# Patient Record
Sex: FEMALE | Race: WHITE | Employment: OTHER | URBAN - METROPOLITAN AREA
[De-identification: names, ages, dates, MRNs, and addresses within clinical notes are randomized per-mention and may not be internally consistent; named-entity substitution may affect disease eponyms.]

---

## 2020-10-08 ENCOUNTER — OFFICE VISIT (OUTPATIENT)
Dept: ORTHOPEDICS CLINIC | Facility: CLINIC | Age: 70
End: 2020-10-08
Payer: COMMERCIAL

## 2020-10-08 DIAGNOSIS — B35.1 ONYCHOMYCOSIS OF FOOT WITH OTHER COMPLICATION: Primary | ICD-10-CM

## 2020-10-08 PROCEDURE — 11721 DEBRIDE NAIL 6 OR MORE: CPT | Performed by: PODIATRIST

## 2020-10-08 PROCEDURE — 99212 OFFICE O/P EST SF 10 MIN: CPT | Performed by: PODIATRIST

## 2020-10-08 RX ORDER — FLUTICASONE PROPIONATE 50 MCG
1 SPRAY, SUSPENSION (ML) NASAL AS NEEDED
COMMUNITY
Start: 2020-09-08

## 2020-10-08 RX ORDER — ATORVASTATIN CALCIUM 10 MG/1
10 TABLET, FILM COATED ORAL NIGHTLY
COMMUNITY
Start: 2020-09-17

## 2020-10-08 RX ORDER — METOPROLOL TARTRATE 50 MG/1
50 TABLET, FILM COATED ORAL 3 TIMES DAILY
Status: ON HOLD | COMMUNITY
Start: 2020-09-17 | End: 2021-01-09

## 2020-10-08 RX ORDER — OMEPRAZOLE 20 MG/1
20 CAPSULE, DELAYED RELEASE ORAL EVERY MORNING
COMMUNITY
Start: 2020-09-17

## 2020-10-08 RX ORDER — PREDNISOLONE ACETATE 10 MG/ML
1 SUSPENSION/ DROPS OPHTHALMIC DAILY PRN
COMMUNITY
Start: 2020-07-31 | End: 2021-08-06

## 2020-10-08 RX ORDER — LEVOTHYROXINE SODIUM 75 MCG
75 TABLET ORAL
COMMUNITY
Start: 2020-09-17 | End: 2021-08-06

## 2020-10-08 RX ORDER — METHYLPREDNISOLONE 4 MG/1
TABLET ORAL
COMMUNITY
Start: 2020-09-08 | End: 2020-11-27

## 2020-10-08 RX ORDER — AZILSARTAN KAMEDOXOMIL 40 MG/1
40 TABLET ORAL DAILY
Status: ON HOLD | COMMUNITY
Start: 2020-09-12 | End: 2021-01-09

## 2020-10-08 NOTE — PROGRESS NOTES
[facility] Progress Note    Subjective: Pt here for follow up and routine nail care for onychomycosis. Hx of lymphedema doc legs and feet for years.   Has been in PT with specialist prior and is awaiting more PT    Objective: severe swelling doc l

## 2020-11-27 ENCOUNTER — APPOINTMENT (OUTPATIENT)
Dept: GENERAL RADIOLOGY | Facility: HOSPITAL | Age: 70
End: 2020-11-27
Attending: EMERGENCY MEDICINE
Payer: COMMERCIAL

## 2020-11-27 ENCOUNTER — HOSPITAL ENCOUNTER (OUTPATIENT)
Facility: HOSPITAL | Age: 70
Setting detail: OBSERVATION
Discharge: HOME | End: 2020-11-30
Attending: EMERGENCY MEDICINE | Admitting: HOSPITALIST
Payer: COMMERCIAL

## 2020-11-27 DIAGNOSIS — I89.0 LYMPHEDEMA: ICD-10-CM

## 2020-11-27 DIAGNOSIS — L03.116 CELLULITIS OF LEFT LOWER EXTREMITY: Primary | ICD-10-CM

## 2020-11-27 PROCEDURE — 71045 X-RAY EXAM CHEST 1 VIEW: CPT | Performed by: EMERGENCY MEDICINE

## 2020-11-27 PROCEDURE — 99220 INITIAL OBSERVATION CARE,LEVL III: CPT | Performed by: HOSPITALIST

## 2020-11-27 RX ORDER — FLUTICASONE PROPIONATE 50 MCG
1 SPRAY, SUSPENSION (ML) NASAL DAILY
Status: DISCONTINUED | OUTPATIENT
Start: 2020-11-27 | End: 2020-11-30

## 2020-11-27 RX ORDER — ENOXAPARIN SODIUM 100 MG/ML
0.5 INJECTION SUBCUTANEOUS EVERY EVENING
Status: DISCONTINUED | OUTPATIENT
Start: 2020-11-27 | End: 2020-11-30

## 2020-11-27 RX ORDER — POTASSIUM CHLORIDE 20 MEQ/1
40 TABLET, EXTENDED RELEASE ORAL EVERY 4 HOURS
Status: COMPLETED | OUTPATIENT
Start: 2020-11-27 | End: 2020-11-27

## 2020-11-27 RX ORDER — CLINDAMYCIN PHOSPHATE 600 MG/50ML
600 INJECTION INTRAVENOUS ONCE
Status: COMPLETED | OUTPATIENT
Start: 2020-11-27 | End: 2020-11-27

## 2020-11-27 RX ORDER — ATORVASTATIN CALCIUM 10 MG/1
10 TABLET, FILM COATED ORAL DAILY
Status: DISCONTINUED | OUTPATIENT
Start: 2020-11-27 | End: 2020-11-30

## 2020-11-27 RX ORDER — CLINDAMYCIN PHOSPHATE 600 MG/50ML
600 INJECTION INTRAVENOUS EVERY 8 HOURS
Status: DISCONTINUED | OUTPATIENT
Start: 2020-11-27 | End: 2020-11-29

## 2020-11-27 RX ORDER — FAMOTIDINE 20 MG/1
20 TABLET ORAL DAILY
Status: DISCONTINUED | OUTPATIENT
Start: 2020-11-27 | End: 2020-11-30

## 2020-11-27 RX ORDER — METOPROLOL TARTRATE 50 MG/1
50 TABLET, FILM COATED ORAL 3 TIMES DAILY
Status: DISCONTINUED | OUTPATIENT
Start: 2020-11-27 | End: 2020-11-30

## 2020-11-27 RX ORDER — LOSARTAN POTASSIUM 25 MG/1
25 TABLET ORAL DAILY
Status: DISCONTINUED | OUTPATIENT
Start: 2020-11-27 | End: 2020-11-30

## 2020-11-27 RX ORDER — LEVOTHYROXINE SODIUM 0.07 MG/1
75 TABLET ORAL
Status: DISCONTINUED | OUTPATIENT
Start: 2020-11-27 | End: 2020-11-30

## 2020-11-27 RX ORDER — PREDNISOLONE ACETATE 10 MG/ML
1 SUSPENSION/ DROPS OPHTHALMIC DAILY
Status: DISCONTINUED | OUTPATIENT
Start: 2020-11-27 | End: 2020-11-30

## 2020-11-27 RX ORDER — FUROSEMIDE 10 MG/ML
20 INJECTION INTRAMUSCULAR; INTRAVENOUS ONCE
Status: COMPLETED | OUTPATIENT
Start: 2020-11-27 | End: 2020-11-27

## 2020-11-27 NOTE — PROGRESS NOTES
Arrives to room 411 per cart from ED, admission navigator completed , states mild discomfort to left leg , large Lymphedema BLE .  Distal aspect and feet, T=99.2, respirations easy , awake alert , appropriate , forgetful , use of walker, DR Cabrera in

## 2020-11-27 NOTE — PROGRESS NOTES
[facility] Pharmacy Note:  Anticoagulation Weight Dose Adjustment for enoxaparin (LOVENOX)    Divya Stinson is a 79year old patient who has been prescribed enoxaparin (LOVENOX) 40 mg every 24 hours.       Estimated Creatinine Clearance: 40.1 mL/min (A) (base

## 2020-11-27 NOTE — H&P
[facility]  History and Physical     Catia Gus Patient Status:  Observation    1950 MRN [id number]   [facility] 4NW-A Attending Ryanne Singh MD   Hosp Day # 0 PCP Stefany Reina     Chief Complaint: fever ONLY  Penicillins             HIVES  Sulfa Antibiotics       RASH  Tetracyclines & Rel*    RASH    Medications:  No current facility-administered medications on file prior to encounter. •  atorvastatin 10 MG Oral Tab, Take 10 mg by mouth daily.   , Disp extremities. Extremities: lymphedema +, bilateral stasis dermatitis, left LE warm with erythema and cellulitis, right leg also with dermatitis, although not warm or tender  Psychiatric: Appropriate mood and affect.       Diagnostic Data:      Labs:  Recent

## 2020-11-27 NOTE — ED INITIAL ASSESSMENT (HPI)
Per pt she was sitting on her bed and slid off the bed, pt denies hitting her head. Pt states her lymphodema is worse on both legs the past 2 weeks. EMS states pt has felt nauseous the past few days and had a temp of 102 in route.      Pt denies any SOB, fe

## 2020-11-27 NOTE — ED PROVIDER NOTES
Patient Seen in: [facility] Emergency Department      History   Patient presents with:  Leg or Foot Injury  Fall    Stated Complaint:     HPI    Patient is a 80-year-old female brought in by EMS after mechanical fall.   She got up to go to the [facility] supple. No JVD present. Cardiovascular: Normal rate and regular rhythm. Normal peripheral perfusion with good color. Pulmonary/Chest: Normal effort. No accessory muscle use. No clubbing, no cyanosis. Abdominal: Soft. There is no tenderness.  There Abnormal            Final result                 Please view results for these tests on the individual orders.    URINALYSIS WITH CULTURE REFLEX   PRO BETA NATRIURETIC PEPTIDE   RAINBOW DRAW BLUE   RAINBOW DRAW LAVENDER   RAINBOW DRAW LIGHT GREEN 72-year-old female here after mechanical fall found to be febrile, has elevated white count and cellulitis in the setting of very significant lymphedema. Given fever and white count as well as her age I feel this should be treated aggressively upfront.

## 2020-11-28 ENCOUNTER — APPOINTMENT (OUTPATIENT)
Dept: CV DIAGNOSTICS | Facility: HOSPITAL | Age: 70
End: 2020-11-28
Attending: HOSPITALIST
Payer: COMMERCIAL

## 2020-11-28 PROCEDURE — 93306 TTE W/DOPPLER COMPLETE: CPT | Performed by: HOSPITALIST

## 2020-11-28 PROCEDURE — 99225 SUBSEQUENT OBSERVATION CARE: CPT | Performed by: HOSPITALIST

## 2020-11-28 NOTE — PLAN OF CARE
A/O. Mid 90's on RA. CPOX. NSR on tele. Cellulitis LLE. BLE with lymphedema. BLE with redness, flaky, dry. L>R. Pt states back of legs painful to touch. Weeping at times left leg. Minimal this shift.  LLE loosely wrapped with kerlix and ace to help skin fro surrounding tissue  - Implement wound care per orders  - Initiate isolation precautions as appropriate  - Initiate Pressure Ulcer prevention bundle as indicated  Outcome: Not Progressing

## 2020-11-28 NOTE — PLAN OF CARE
Problem: PAIN - ADULT  Goal: Verbalizes/displays adequate comfort level or patient's stated pain goal  Description: INTERVENTIONS:  - Encourage pt to monitor pain and request assistance  - Assess pain using appropriate pain scale  - Administer analgesics this time. Voiding without difficulty. BLE noted with lymphedema & redness/flakiness. BLE elevated on pillows. POC discussed with pt, pt verbalized understanding. Will continue to monitor.

## 2020-11-28 NOTE — PROGRESS NOTES
[facility]  Progress Note     Neena Holguin Patient Status:  Observation    1950 MRN [id number]   [facility] 4NW-A Attending Joshua White MD   Hosp Day # 0 PCP Warden Vegas     Chief Complaint: cellulitis Daily   • Losartan Potassium  25 mg Oral Daily   • famoTIDine  20 mg Oral Daily   • Fluticasone Propionate  1 spray Nasal Daily   • Metoprolol Tartrate  50 mg Oral TID   • prednisoLONE acetate  1 drop Right Eye Daily   • Levothyroxine Sodium  75 mcg Oral B

## 2020-11-28 NOTE — PHYSICAL THERAPY NOTE
PHYSICAL THERAPY EVALUATION - INPATIENT     Room Number: 207/275-G  Evaluation Date: 11/28/2020  Type of Evaluation: Initial  Physician Order: PT Eval and Treat    Presenting Problem: cellulitis, fever  Reason for Therapy: Mobility Dysfunction and Renita Nice therapist again    OBJECTIVE     Fall Risk: Standard fall risk    WEIGHT BEARING RESTRICTION  Weight Bearing Restriction: None                PAIN ASSESSMENT  Ratin  Location: B LE  Management Techniques:  Activity promotion;Relaxation;Repositioning Evaluation; goals, role of PT and POC discussed with patient.   Patient was received in bed with HOB up to 70, patient performed supine->sit to EOB cga with HOB up and patient was able to move B LE off the bed needing increase in time to move B LE, patient showed 46.58% impairment. Based on this evaluation, patient's clinical presentation is evolving and overall the evaluation complexity is considered moderate.   These impairments and comorbidities manifest themselves as functional limitations in independent

## 2020-11-29 PROCEDURE — 99225 SUBSEQUENT OBSERVATION CARE: CPT | Performed by: HOSPITALIST

## 2020-11-29 RX ORDER — CEFAZOLIN SODIUM/WATER 2 G/20 ML
2 SYRINGE (ML) INTRAVENOUS EVERY 8 HOURS
Status: DISCONTINUED | OUTPATIENT
Start: 2020-11-29 | End: 2020-11-30

## 2020-11-29 NOTE — OCCUPATIONAL THERAPY NOTE
OCCUPATIONAL THERAPY EVALUATION - INPATIENT     Room Number: 184/956-J  Evaluation Date: 11/29/2020  Type of Evaluation: Initial  Presenting Problem: BLE cellulitis    Physician Order: IP Consult to Occupational Therapy  Reason for Therapy: ADL/IADL Dysfun had worked with a lymphedema therapist ~2 years ago and was able to get BLE edema under control.   Patient had been prescribed compression garments, however patient stopped wearing them ~1.5 years ago when she was unable to nikkie d/t restrictions s/p corneal educated on OT role, goals, plan of care, recommendations and safety.   Educated pt in the guidelines for lymphedema treatment following dx of infection: hold all treatment for 72 hours; beginning compression post 72 hours of antibiotics, as long as infecti nikkie compression stockings, instrumental activities of daily living, rest and sleep, work, leisure and social participation.      The patient is functioning below her previous functional level and would benefit from skilled inpatient OT to address the above exercises to BLE for edema reduction, independent level.

## 2020-11-29 NOTE — PLAN OF CARE
Problem: Impaired Activities of Daily Living  Goal: Achieve highest/safest level of independence in self care  Description: Interventions:  - Assess ability and encourage patient to participate in ADLs to maximize function  - Promote sitting position [facility]

## 2020-11-29 NOTE — PROGRESS NOTES
[facility]  Progress Note     Ramiro Hawk Patient Status:  Observation    1950 MRN [id number]   [facility] 4NW-A Attending Haleigh Leone MD   Hosp Day # 0 PCP Ruthy Bailey     Chief Complaint: cellulitis Daily   • Losartan Potassium  25 mg Oral Daily   • famoTIDine  20 mg Oral Daily   • Fluticasone Propionate  1 spray Nasal Daily   • Metoprolol Tartrate  50 mg Oral TID   • prednisoLONE acetate  1 drop Right Eye Daily   • Levothyroxine Sodium  75 mcg Oral B

## 2020-11-29 NOTE — PLAN OF CARE
Redness and swelling of BLE remains the same per patient, denies pain at rest, need assistance in getting out of bed. Fall risk, patient verbalized understanding re: using the call lights, bed alarm on. On ABT for cellulitis of LE. Denies SOB. POC cont. period  Description: INTERVENTIONS  - Monitor WBC  - Administer growth factors as ordered  - Implement neutropenic guidelines  Outcome: Progressing     Problem: Impaired Functional Mobility  Goal: Achieve highest/safest level of mobility/gait  Description:

## 2020-11-30 VITALS
TEMPERATURE: 98 F | DIASTOLIC BLOOD PRESSURE: 94 MMHG | HEIGHT: 63 IN | BODY MASS INDEX: 49.63 KG/M2 | WEIGHT: 280.13 LBS | HEART RATE: 53 BPM | RESPIRATION RATE: 20 BRPM | SYSTOLIC BLOOD PRESSURE: 147 MMHG | OXYGEN SATURATION: 96 %

## 2020-11-30 PROCEDURE — 99217 OBSERVATION CARE DISCHARGE: CPT | Performed by: HOSPITALIST

## 2020-11-30 RX ORDER — ACETAMINOPHEN 325 MG/1
650 TABLET ORAL EVERY 6 HOURS PRN
Status: DISCONTINUED | OUTPATIENT
Start: 2020-11-30 | End: 2020-11-30

## 2020-11-30 RX ORDER — CEFADROXIL 500 MG/1
1 CAPSULE ORAL 2 TIMES DAILY
Qty: 40 CAPSULE | Refills: 0 | Status: SHIPPED | OUTPATIENT
Start: 2020-11-30 | End: 2020-12-10

## 2020-11-30 NOTE — PLAN OF CARE
Pt alert and oriented x4. Pt denies SOB and VSS. No c/o pain. IV antibiotics continued. Up to bathroom. Voiding adequate amount of urine. Legs with lymphedema. Very red and swollen. Pt updated on POC. Will continue to monitor.

## 2020-11-30 NOTE — CM/SW NOTE
11/30/20 1300   CM/SW Screening   Referral Source Social Work (self-referral)   Information Source Chart review;Nursing rounds   Patient's Mental Status Alert;Oriented   Patient's Home Environment Condo/Apt with elevator   Patient Status Prior to [employer]

## 2020-11-30 NOTE — OCCUPATIONAL THERAPY NOTE
OCCUPATIONAL THERAPY TREATMENT NOTE - INPATIENT     Room Number: 196/969-F  Session: 1   Number of Visits to Meet Established Goals: 4    Presenting Problem: BLE cellulitis    History related to current admission:  Patient is a 75y/o female admitted 11/27/ includes using toilet, bedpan or urinal? : None  -   Putting on and taking off regular upper body clothing?: None  -   Taking care of personal grooming such as brushing teeth?: None  -   Eating meals?: None    AM-PAC Score:  Score: 20  Approx Degree of Imp information on recommended alternative compressions, sequential pneumatic pumps, decongestive treatment was also provided.   Supine to EOB with supervision and c/o right hip pain   EOB to stand with supervsion  On/off toilet: mod I  Toileting: mod I  Standi care to BLE.   Patient will perform decongestive exercises to BLE for edema reduction, independent level.

## 2020-11-30 NOTE — PLAN OF CARE
Pt. A&O x4. VSS. Afebrile. No c/o pain. Pt. Able to ambulate to the bathroom with minimal staff assist with the walker. Lymphedema noted to BLE. IV antibiotics. Call light within reach. Will continue to monitor. 1700: Pt.  Ok to d/c per MD's w/ PO anti precautions as appropriate  - Initiate Pressure Ulcer prevention bundle as indicated  Outcome: Progressing     Problem: RISK FOR INFECTION - ADULT  Goal: Absence of fever/infection during anticipated neutropenic period  Description: INTERVENTIONS  - Monito

## 2020-11-30 NOTE — PLAN OF CARE
Assumed care at 2300  Patient requesting Tylenol for headache - Dr. Douglas Naidu notified, Tylenol ordered  BLE with redness and swelling, L>R  IV Ancef  Will continue to monitor

## 2020-11-30 NOTE — PHYSICAL THERAPY NOTE
PHYSICAL THERAPY TREATMENT NOTE - INPATIENT    Room Number: 158/106-R     Session: 1  Number of Visits to Meet Established Goals: 2    Presenting Problem: cellulitis, fever    Problem List  Principal Problem:    Cellulitis of left lower extremity  Active the patient currently need. ..   -   Moving to and from a bed to a chair (including a wheelchair)?: A Little   -   Need to walk in hospital room?: A Little   -   Climbing 3-5 steps with a railing?: A Little       AM-PAC Score:  Raw Score: 21   Approx Degree standing rest breaks, and frequent question asking.  Once pt returned to chair,increased time spent on education and discussion with pt regarding symptom management and rest, activity level and pacing, home safety and set up, HHPT recommendation (pt in agre this level of impairment may benefit from DC to home with HHPT and outpatient OT for lymphedema management. [place] DISCHARGE RECOMMENDATIONS  PT Discharge Recommendations: Home with home health PT     PLAN  PT Treatment Plan: Bed mobility;Gait training; Avery Reina

## 2020-11-30 NOTE — PLAN OF CARE
Problem: Impaired Activities of Daily Living  Goal: Achieve highest/safest level of independence in self care  Description: Interventions:  - Assess ability and encourage patient to participate in ADLs to maximize function  - Promote sitting position [facility]

## 2020-11-30 NOTE — PLAN OF CARE
Afebrile. Resting in bed, on ABT re cellulitis of BLE, redness and swelling appeared the same from yesterday, elevated with pillow, denies SOB, up to the bathroom with walker with assist. POC cont.     Problem: PAIN - ADULT  Goal: Verbalizes/displays adequa growth factors as ordered  - Implement neutropenic guidelines  Outcome: Progressing

## 2020-11-30 NOTE — PHYSICAL THERAPY NOTE
Chart reviewed and RN approved PT session. PT with PPE on to include mask, gloves, and goggles. Attempted to see pt for PT treatment session, and pt declined as pt states that she only got 2 hours of sleep and just woke up.  Will attempt to see pt later as

## 2020-11-30 NOTE — PROGRESS NOTES
[facility]  Progress Note     Patricia Kristal Patient Status:  Observation    1950 MRN [id number]   [facility] 4NW-A Attending Lisa Lee MD   Hosp Day # 0 PCP Laisha Pickard     Chief Complaint: cellulitis atorvastatin  10 mg Oral Daily   • Losartan Potassium  25 mg Oral Daily   • famoTIDine  20 mg Oral Daily   • Fluticasone Propionate  1 spray Nasal Daily   • Metoprolol Tartrate  50 mg Oral TID   • prednisoLONE acetate  1 drop Right Eye Daily   • Levothyrox

## 2020-12-01 NOTE — DISCHARGE SUMMARY
[facility] HOSPITALIST  DISCHARGE SUMMARY     Aftab Ferguson Patient Status:  Observation    1950 MRN [id number]   [facility] 4NW-A Attending Tavo Garland MD   Hosp Day # 0  Teresita     Date of Admission: 20 home health has been cleared for discharge. Home today will need follow-up for lymphedema. Lace+ Score: 35  59-90 High Risk  29-58 Medium Risk  0-28   Low Risk         TCM Follow-Up Recommendation:  LACE > 58:  High Risk of readmission after discharge f every morning. Refills: 0     prednisoLONE acetate 1 % Susp  Commonly known as: PRED FORTE      Place 1 drop into the right eye daily.    Refills: 0     Synthroid 75 MCG Tabs  Generic drug: Levothyroxine Sodium      Take 75 mcg by mouth before breakfast.

## 2021-01-06 ENCOUNTER — HOSPITAL ENCOUNTER (OUTPATIENT)
Facility: HOSPITAL | Age: 71
Setting detail: OBSERVATION
Discharge: HOME HEALTH | End: 2021-01-09
Attending: EMERGENCY MEDICINE | Admitting: HOSPITALIST
Payer: MEDICARE

## 2021-01-06 DIAGNOSIS — E66.01 MORBID OBESITY (HCC): ICD-10-CM

## 2021-01-06 DIAGNOSIS — L03.116 LEFT LEG CELLULITIS: Primary | ICD-10-CM

## 2021-01-06 DIAGNOSIS — I89.0 LYMPHEDEMA: ICD-10-CM

## 2021-01-06 LAB
ALBUMIN SERPL-MCNC: 2.7 G/DL (ref 3.4–5)
ALBUMIN/GLOB SERPL: 0.6 {RATIO} (ref 1–2)
ALP LIVER SERPL-CCNC: 95 U/L
ALT SERPL-CCNC: 18 U/L
ANION GAP SERPL CALC-SCNC: 10 MMOL/L (ref 0–18)
AST SERPL-CCNC: 32 U/L (ref 15–37)
BASOPHILS # BLD AUTO: 0.01 X10(3) UL (ref 0–0.2)
BASOPHILS NFR BLD AUTO: 0.1 %
BILIRUB SERPL-MCNC: 0.6 MG/DL (ref 0.1–2)
BUN BLD-MCNC: 29 MG/DL (ref 7–18)
BUN/CREAT SERPL: 24.8 (ref 10–20)
CALCIUM BLD-MCNC: 9.4 MG/DL (ref 8.5–10.1)
CHLORIDE SERPL-SCNC: 101 MMOL/L (ref 98–112)
CO2 SERPL-SCNC: 24 MMOL/L (ref 21–32)
CREAT BLD-MCNC: 1.17 MG/DL
DEPRECATED RDW RBC AUTO: 42.7 FL (ref 35.1–46.3)
EOSINOPHIL # BLD AUTO: 0.19 X10(3) UL (ref 0–0.7)
EOSINOPHIL NFR BLD AUTO: 2.1 %
ERYTHROCYTE [DISTWIDTH] IN BLOOD BY AUTOMATED COUNT: 12.7 % (ref 11–15)
GLOBULIN PLAS-MCNC: 4.5 G/DL (ref 2.8–4.4)
GLUCOSE BLD-MCNC: 107 MG/DL (ref 70–99)
HCT VFR BLD AUTO: 38 %
HGB BLD-MCNC: 13 G/DL
IMM GRANULOCYTES # BLD AUTO: 0.03 X10(3) UL (ref 0–1)
IMM GRANULOCYTES NFR BLD: 0.3 %
LACTATE SERPL-SCNC: 1.1 MMOL/L (ref 0.4–2)
LACTATE SERPL-SCNC: 2.7 MMOL/L (ref 0.4–2)
LYMPHOCYTES # BLD AUTO: 0.66 X10(3) UL (ref 1–4)
LYMPHOCYTES NFR BLD AUTO: 7.2 %
M PROTEIN MFR SERPL ELPH: 7.2 G/DL (ref 6.4–8.2)
MCH RBC QN AUTO: 31.6 PG (ref 26–34)
MCHC RBC AUTO-ENTMCNC: 34.2 G/DL (ref 31–37)
MCV RBC AUTO: 92.5 FL
MONOCYTES # BLD AUTO: 0.78 X10(3) UL (ref 0.1–1)
MONOCYTES NFR BLD AUTO: 8.5 %
NEUTROPHILS # BLD AUTO: 7.52 X10 (3) UL (ref 1.5–7.7)
NEUTROPHILS # BLD AUTO: 7.52 X10(3) UL (ref 1.5–7.7)
NEUTROPHILS NFR BLD AUTO: 81.8 %
NT-PROBNP SERPL-MCNC: 109 PG/ML (ref ?–125)
OSMOLALITY SERPL CALC.SUM OF ELEC: 286 MOSM/KG (ref 275–295)
PLATELET # BLD AUTO: 197 10(3)UL (ref 150–450)
POTASSIUM SERPL-SCNC: 3.4 MMOL/L (ref 3.5–5.1)
RBC # BLD AUTO: 4.11 X10(6)UL
SARS-COV-2 RNA RESP QL NAA+PROBE: NOT DETECTED
SODIUM SERPL-SCNC: 135 MMOL/L (ref 136–145)
WBC # BLD AUTO: 9.2 X10(3) UL (ref 4–11)

## 2021-01-06 PROCEDURE — 99220 INITIAL OBSERVATION CARE,LEVL III: CPT | Performed by: HOSPITALIST

## 2021-01-06 RX ORDER — FLUTICASONE PROPIONATE 50 MCG
1 SPRAY, SUSPENSION (ML) NASAL
Status: DISCONTINUED | OUTPATIENT
Start: 2021-01-06 | End: 2021-01-09

## 2021-01-06 RX ORDER — ONDANSETRON 2 MG/ML
4 INJECTION INTRAMUSCULAR; INTRAVENOUS EVERY 4 HOURS PRN
Status: DISCONTINUED | OUTPATIENT
Start: 2021-01-06 | End: 2021-01-06

## 2021-01-06 RX ORDER — FAMOTIDINE 20 MG/1
20 TABLET ORAL DAILY
Status: DISCONTINUED | OUTPATIENT
Start: 2021-01-06 | End: 2021-01-06

## 2021-01-06 RX ORDER — MELATONIN
1000 DAILY
COMMUNITY

## 2021-01-06 RX ORDER — HEPARIN SODIUM 5000 [USP'U]/ML
7500 INJECTION, SOLUTION INTRAVENOUS; SUBCUTANEOUS EVERY 8 HOURS SCHEDULED
Status: DISCONTINUED | OUTPATIENT
Start: 2021-01-06 | End: 2021-01-09

## 2021-01-06 RX ORDER — LEVOTHYROXINE SODIUM 0.07 MG/1
75 TABLET ORAL
Status: DISCONTINUED | OUTPATIENT
Start: 2021-01-07 | End: 2021-01-09

## 2021-01-06 RX ORDER — HYDROCODONE BITARTRATE AND ACETAMINOPHEN 5; 325 MG/1; MG/1
2 TABLET ORAL EVERY 4 HOURS PRN
Status: DISCONTINUED | OUTPATIENT
Start: 2021-01-06 | End: 2021-01-09

## 2021-01-06 RX ORDER — CEFAZOLIN SODIUM/WATER 2 G/20 ML
2 SYRINGE (ML) INTRAVENOUS ONCE
Status: COMPLETED | OUTPATIENT
Start: 2021-01-06 | End: 2021-01-06

## 2021-01-06 RX ORDER — FUROSEMIDE 10 MG/ML
40 INJECTION INTRAMUSCULAR; INTRAVENOUS
Status: DISCONTINUED | OUTPATIENT
Start: 2021-01-06 | End: 2021-01-07

## 2021-01-06 RX ORDER — ONDANSETRON 2 MG/ML
4 INJECTION INTRAMUSCULAR; INTRAVENOUS EVERY 6 HOURS PRN
Status: DISCONTINUED | OUTPATIENT
Start: 2021-01-06 | End: 2021-01-09

## 2021-01-06 RX ORDER — METOPROLOL TARTRATE 50 MG/1
50 TABLET, FILM COATED ORAL 3 TIMES DAILY
Status: DISCONTINUED | OUTPATIENT
Start: 2021-01-06 | End: 2021-01-09

## 2021-01-06 RX ORDER — HYDROCODONE BITARTRATE AND ACETAMINOPHEN 5; 325 MG/1; MG/1
1 TABLET ORAL EVERY 4 HOURS PRN
Status: DISCONTINUED | OUTPATIENT
Start: 2021-01-06 | End: 2021-01-09

## 2021-01-06 RX ORDER — PANTOPRAZOLE SODIUM 20 MG/1
20 TABLET, DELAYED RELEASE ORAL
Status: DISCONTINUED | OUTPATIENT
Start: 2021-01-07 | End: 2021-01-09

## 2021-01-06 RX ORDER — PREDNISOLONE ACETATE 10 MG/ML
1 SUSPENSION/ DROPS OPHTHALMIC DAILY
Status: DISCONTINUED | OUTPATIENT
Start: 2021-01-06 | End: 2021-01-09

## 2021-01-06 RX ORDER — ACETAMINOPHEN 325 MG/1
650 TABLET ORAL EVERY 4 HOURS PRN
Status: DISCONTINUED | OUTPATIENT
Start: 2021-01-06 | End: 2021-01-09

## 2021-01-06 RX ORDER — MULTIVIT-MIN/IRON/FOLIC ACID/K 18-600-40
2000 CAPSULE ORAL DAILY
COMMUNITY

## 2021-01-06 RX ORDER — CEFAZOLIN SODIUM/WATER 2 G/20 ML
2 SYRINGE (ML) INTRAVENOUS EVERY 12 HOURS
Status: DISCONTINUED | OUTPATIENT
Start: 2021-01-07 | End: 2021-01-07

## 2021-01-06 RX ORDER — ATORVASTATIN CALCIUM 10 MG/1
10 TABLET, FILM COATED ORAL DAILY
Status: DISCONTINUED | OUTPATIENT
Start: 2021-01-06 | End: 2021-01-09

## 2021-01-06 RX ORDER — METOCLOPRAMIDE HYDROCHLORIDE 5 MG/ML
5 INJECTION INTRAMUSCULAR; INTRAVENOUS EVERY 8 HOURS PRN
Status: DISCONTINUED | OUTPATIENT
Start: 2021-01-06 | End: 2021-01-09

## 2021-01-06 NOTE — ED PROVIDER NOTES
Patient Seen in: [facility] Emergency Department      History   Patient presents with:  Leg or Foot Injury    Stated Complaint: LEG PAIN, TROUBLE WALKING    HPI/Subjective:   HPI    72-year-old female with a history of chronic lymphedema, hypertensi bilaterally. Abdomen: Soft, nondistended, nontender. Extremities: No evidence of deformity. Patient with significant lymphedema bilaterally. She has erythema to her left leg extending down from her knee Down to the foot.   She has some weeping of fluid medications for the purposes of treating  [left leg cellulitis]. The patient had good response to these medications. Patient continued to be observed here in the emergency department.   Patient remained stable throughout the emergency department observa

## 2021-01-06 NOTE — PROGRESS NOTES
[facility] Pharmacy Note: Antimicrobial Weight Based Dose Adjustment for: cefazolin (ANCEF)    Noah Brizuela is a 79year old patient who has been prescribed cefazolin (ANCEF) 1 g       Wt Readings from Last 6 Encounters:  01/06/21 : 127 kg (280 lb)  11/29/2

## 2021-01-07 LAB
ANION GAP SERPL CALC-SCNC: 8 MMOL/L (ref 0–18)
BASOPHILS # BLD AUTO: 0.01 X10(3) UL (ref 0–0.2)
BASOPHILS NFR BLD AUTO: 0.1 %
BUN BLD-MCNC: 27 MG/DL (ref 7–18)
BUN/CREAT SERPL: 27.6 (ref 10–20)
CALCIUM BLD-MCNC: 8.3 MG/DL (ref 8.5–10.1)
CHLORIDE SERPL-SCNC: 103 MMOL/L (ref 98–112)
CO2 SERPL-SCNC: 26 MMOL/L (ref 21–32)
CREAT BLD-MCNC: 0.98 MG/DL
DEPRECATED RDW RBC AUTO: 44.1 FL (ref 35.1–46.3)
EOSINOPHIL # BLD AUTO: 0.23 X10(3) UL (ref 0–0.7)
EOSINOPHIL NFR BLD AUTO: 3.2 %
ERYTHROCYTE [DISTWIDTH] IN BLOOD BY AUTOMATED COUNT: 12.9 % (ref 11–15)
GLUCOSE BLD-MCNC: 104 MG/DL (ref 70–99)
HCT VFR BLD AUTO: 32.8 %
HGB BLD-MCNC: 10.8 G/DL
IMM GRANULOCYTES # BLD AUTO: 0.03 X10(3) UL (ref 0–1)
IMM GRANULOCYTES NFR BLD: 0.4 %
LYMPHOCYTES # BLD AUTO: 0.74 X10(3) UL (ref 1–4)
LYMPHOCYTES NFR BLD AUTO: 10.4 %
MCH RBC QN AUTO: 30.8 PG (ref 26–34)
MCHC RBC AUTO-ENTMCNC: 32.9 G/DL (ref 31–37)
MCV RBC AUTO: 93.4 FL
MONOCYTES # BLD AUTO: 0.85 X10(3) UL (ref 0.1–1)
MONOCYTES NFR BLD AUTO: 11.9 %
NEUTROPHILS # BLD AUTO: 5.28 X10 (3) UL (ref 1.5–7.7)
NEUTROPHILS # BLD AUTO: 5.28 X10(3) UL (ref 1.5–7.7)
NEUTROPHILS NFR BLD AUTO: 74 %
OSMOLALITY SERPL CALC.SUM OF ELEC: 289 MOSM/KG (ref 275–295)
PLATELET # BLD AUTO: 186 10(3)UL (ref 150–450)
POTASSIUM SERPL-SCNC: 3.3 MMOL/L (ref 3.5–5.1)
POTASSIUM SERPL-SCNC: 4 MMOL/L (ref 3.5–5.1)
RBC # BLD AUTO: 3.51 X10(6)UL
SODIUM SERPL-SCNC: 137 MMOL/L (ref 136–145)
WBC # BLD AUTO: 7.1 X10(3) UL (ref 4–11)

## 2021-01-07 PROCEDURE — 99225 SUBSEQUENT OBSERVATION CARE: CPT | Performed by: HOSPITALIST

## 2021-01-07 RX ORDER — POTASSIUM CHLORIDE 20 MEQ/1
40 TABLET, EXTENDED RELEASE ORAL EVERY 4 HOURS
Status: COMPLETED | OUTPATIENT
Start: 2021-01-07 | End: 2021-01-07

## 2021-01-07 RX ORDER — CEFAZOLIN SODIUM/WATER 2 G/20 ML
2 SYRINGE (ML) INTRAVENOUS EVERY 8 HOURS
Status: DISCONTINUED | OUTPATIENT
Start: 2021-01-07 | End: 2021-01-09

## 2021-01-07 NOTE — CONSULTS
[facility]  Report of Inpatient Wound Care Consultation    Serena Eng Patient Status:  Observation    1950 MRN [id number]   [facility] 3SW-A Attending Ara Chang MD   Hosp Day # 0 PCP Hillary Byrd Abena leg :lymphedema- weeping with moderate to large amount of serous yellow exudate. No odor. Noted scattered superficial open blisters.     Edema assessment:  Heel to back of knee[10  marker]  Heel to midcalf [29 marker]    Ankle   Right  50.5cm  Left    44.8cm

## 2021-01-07 NOTE — H&P
[facility]  History and Physical     Catia Weber Patient Status:  Emergency    1950 MRN [id number]   Location [address] Attending Luis Fernando Tadeo MD   Hosp Day # 0 PCP Stefany Reina     Chief Complai EDARBI 40 MG Oral Tab, Take 40 mg by mouth daily. , Disp: , Rfl:     •  SYNTHROID 75 MCG Oral Tab, Take 75 mcg by mouth before breakfast.  , Disp: , Rfl:     •  Metoprolol Tartrate 50 MG Oral Tab, Take 50 mg by mouth 3 (three) times daily.   , Disp: , Rfl BILT 0.6   TP 7.2       Estimated Creatinine Clearance: 37 mL/min (A) (based on SCr of 1.17 mg/dL (H)). No results for input(s): PTP, INR in the last 168 hours. No results for input(s): TROP, CK in the last 168 hours.     Imaging: Imaging data revie

## 2021-01-07 NOTE — PLAN OF CARE
Wound care to see  Daily dressing changes to BLE  Significant lymphedema to BLE with weeping blisters   RA  Heparin subcutaneous  Physical therapy to see  Potassium replaced per protocol

## 2021-01-07 NOTE — PROGRESS NOTES
[facility] note: Duplicate Proton Pump Inhibitor with Histamine 2 blocker. Liza Recinos is a 79year old patient who has been prescribed both famotidine (Pepcid)  And pantoprazole (Protonix).   Pepcid was discontinued per P&T approved protocol fo

## 2021-01-07 NOTE — PHYSICAL THERAPY NOTE
PHYSICAL THERAPY EVALUATION - INPATIENT     Room Number: 384/384-A  Evaluation Date: 1/7/2021  Type of Evaluation: Initial  Physician Order: PT Eval and Treat    Presenting Problem:  B LE cellulitis  Reason for Therapy: Mobility Dysfunction and Discha eval    Lower extremity ROM is within functional limits significantly limited by edema    Lower extremity strength is within functional limits grossly observed to be 4-/5 overall functionally.     BALANCE  Static Sitting: Good  Dynamic Sitting: Good  Static addressed    ASSESSMENT   Patient is a 79year old female admitted on 1/6/2021 for LE cellulitis. Pertinent comorbidities and personal factors impacting therapy include lymphedema.   In this PT evaluation, the patient presents with the following impairment

## 2021-01-07 NOTE — PROGRESS NOTES
NURSING ADMISSION NOTE      Patient admitted via Cart, transferred to bed x4 person assist  Oriented to room. Safety precautions initiated. Bed in low position. Call light in reach.

## 2021-01-07 NOTE — PLAN OF CARE
Pt a/o x4. VSS. No c/o pain at rest, pain with movement of legs. No N/T. BLE +3 pitting edema. LLE has more redness than RLE. Blistering and weeping to BLE. Vaseline gauze and kerlix applied to BLE. Purewick in place, pt voiding clear yellow urine.      Louise

## 2021-01-07 NOTE — PROGRESS NOTES
[facility]  Progress Note     Gurpreet Reece Patient Status:  Observation    1950 MRN [id number]   [facility] 3SW-A Attending Tre Schuler MD   Hosp Day # 0 PCP Evan Gerardo     Chief Complaint: cellulitis Imaging data reviewed in Epic.     Medications:   • Potassium Chloride ER  40 mEq Oral Q4H   • atorvastatin  10 mg Oral Daily   • Metoprolol Tartrate  50 mg Oral TID   • Pantoprazole Sodium  20 mg Oral QAM AC   • prednisoLONE acetate  1 drop Right Eye Daily

## 2021-01-07 NOTE — PROGRESS NOTES
[facility] Pharmacy Note:  Anticoagulation Weight Dose Adjustment for heparin    Richerd Sushila is a 79year old patient who has been prescribed heparin 5000 units every 8 hours.       Estimated Creatinine Clearance: 37 mL/min (A) (based on SCr of 1.17 mg/dL (

## 2021-01-07 NOTE — PROGRESS NOTES
[facility] Pharmacy Note:  Renal Adjustment for cefazolin (ANCEF)    Divya Stinson is a 79year old patient who has been prescribed cefazolin (ANCEF) 2 g every 8 hrs. CrCl is estimated creatinine clearance is 37 mL/min (A) (based on SCr of 1.17 mg/dL (H)).

## 2021-01-08 ENCOUNTER — APPOINTMENT (OUTPATIENT)
Dept: ULTRASOUND IMAGING | Facility: HOSPITAL | Age: 71
End: 2021-01-08
Attending: HOSPITALIST
Payer: MEDICARE

## 2021-01-08 PROCEDURE — 76882 US LMTD JT/FCL EVL NVASC XTR: CPT | Performed by: HOSPITALIST

## 2021-01-08 PROCEDURE — 99225 SUBSEQUENT OBSERVATION CARE: CPT | Performed by: HOSPITALIST

## 2021-01-08 RX ORDER — POTASSIUM CHLORIDE 750 MG/1
10 TABLET, EXTENDED RELEASE ORAL DAILY
Status: DISCONTINUED | OUTPATIENT
Start: 2021-01-08 | End: 2021-01-08

## 2021-01-08 NOTE — CM/SW NOTE
01/08/21 1300   CM/SW Referral Data   Referral Source Physician   Reason for Referral Discharge planning   Informant Patient   Patient Info   Patient's Mental Status Alert;Oriented   Patient's Home Environment Condo/Apt with elevator   Number of Levels

## 2021-01-08 NOTE — PROGRESS NOTES
[facility]  Progress Note     Nina Sol Patient Status:  Observation    1950 MRN [id number]   [facility] 3SW-A Attending Rochelle Meade MD   Hosp Day # 0 PCP Lashon Alejandre     Chief Complaint: cellulitis Markers  No results for input(s): CRP, ANNETTE, LDH, DDIMER in the last 168 hours. Imaging: Imaging data reviewed in Epic.     Medications:   • ceFAZolin  2 g Intravenous Q8H   • atorvastatin  10 mg Oral Daily   • Metoprolol Tartrate  50 mg Oral TID   • Pant

## 2021-01-08 NOTE — OCCUPATIONAL THERAPY NOTE
Orders received, chart reviewed and case discussed with PT and RN. Patient is known to this therapist from previous admit in Nov/2020 for BLE cellulitis. Patient was treated w/ light compression and extensive lymphedema education was conducted.   Written

## 2021-01-08 NOTE — PLAN OF CARE
Dressing changed to BLE, OT ordered for BLE lymphedema, spoke w/ Kelley OT, at this time unable to see due to weeping of wound -recommends to see outpatient PT upon discharge, IV abx con't , pain meds prn, up w/ asst, updated on plan of care.

## 2021-01-08 NOTE — PHYSICAL THERAPY NOTE
Attempted to see Pt this AM - RN Emma Kramer aware of attempt. Pt reported difficulty resting d/t multiple visitors - requesting to rest at this time. Will continue to follow.

## 2021-01-08 NOTE — PLAN OF CARE
A&Ox4. Pain to BLE increases when oob. Declining prn pain meds. States pain improves when back in bed. Still having small weeping. VSS. On RA. Ambulated to bathroom with min assist/walker. Call light within reach.

## 2021-01-09 VITALS
OXYGEN SATURATION: 96 % | BODY MASS INDEX: 49.61 KG/M2 | TEMPERATURE: 99 F | HEIGHT: 63 IN | HEART RATE: 53 BPM | WEIGHT: 280 LBS | RESPIRATION RATE: 18 BRPM | DIASTOLIC BLOOD PRESSURE: 52 MMHG | SYSTOLIC BLOOD PRESSURE: 138 MMHG

## 2021-01-09 PROCEDURE — 99217 OBSERVATION CARE DISCHARGE: CPT | Performed by: HOSPITALIST

## 2021-01-09 RX ORDER — METOPROLOL TARTRATE 50 MG/1
100 TABLET, FILM COATED ORAL EVERY MORNING
Refills: 0 | Status: SHIPPED | COMMUNITY
Start: 2021-01-09

## 2021-01-09 RX ORDER — CEFADROXIL 500 MG/1
1 CAPSULE ORAL 2 TIMES DAILY
Qty: 40 CAPSULE | Refills: 0 | Status: SHIPPED | OUTPATIENT
Start: 2021-01-09 | End: 2021-01-19

## 2021-01-09 RX ORDER — POLYETHYLENE GLYCOL 3350 17 G/17G
17 POWDER, FOR SOLUTION ORAL DAILY
Status: DISCONTINUED | OUTPATIENT
Start: 2021-01-09 | End: 2021-01-09

## 2021-01-09 NOTE — PROGRESS NOTES
[facility]  Progress Note     Steven Irizarry Patient Status:  Observation    1950 MRN [id number]   [facility] 3SW-A Attending Mac Valverde MD   Hosp Day # 0 PCP Rian Birmingham     Chief Complaint: cellulitis Markers  No results for input(s): CRP, ANNETTE, LDH, DDIMER in the last 168 hours. Imaging: Imaging data reviewed in Epic.     Medications:   • PEG 3350  17 g Oral Daily   • ceFAZolin  2 g Intravenous Q8H   • atorvastatin  10 mg Oral Daily   • Metoprolol Tar

## 2021-01-09 NOTE — CM/SW NOTE
[facility] to arrive at 6:00pm. Rn stated the pt's brother may pick her up.  RN to cancel [facility] if pt no longer needs it [phone]

## 2021-01-09 NOTE — CM/SW NOTE
SW spoke w/pt in regards to dc plan. Pt stating she feels she is not going to do well at home. Provided support and informed her Abbie Eckert will be servicing her. Also offered for pt to go to a rehab center. She is concerned about COVID.  Sent referra

## 2021-01-09 NOTE — PLAN OF CARE
Plan=today -home w/ [facility] , Instructions to be placed in AVS, instructed on discharge instructions , denies pain, [facility] to be set up for will call after 1700, updated on plan of care.

## 2021-01-09 NOTE — PLAN OF CARE
Discharge planning for today, lives alone & states boxes everywhere, seen by Dr. Cornell Ríos - SW consult ordered, needs help w/ bilateral dressing changes, and requests for help at home , will page  , denies pain,  dsg changed - see instructions.

## 2021-01-09 NOTE — PHYSICAL THERAPY NOTE
PHYSICAL THERAPY TREATMENT NOTE - INPATIENT    Room Number: 384/384-A     Session: 1   Number of Visits to Meet Established Goals: 3    Presenting Problem:  B LE cellulitis    Problem List  Principal Problem:    Left leg cellulitis  Active Problems:    Lym currently need. ..   -   Moving to and from a bed to a chair (including a wheelchair)?: None   -   Need to walk in hospital room?: None   -   Climbing 3-5 steps with a railing?: A Little(infer)       AM-PAC Score:  Raw Score: 23   Approx Degree of Impairmen from [address] RN aware to re-order if there is a decline in mobility status. DISCHARGE RECOMMENDATIONS  PT Discharge Recommendations: Home with home health PT     PLAN  PT Treatment Plan: Bed mobility; Endurance; Patient education; Family educat

## 2021-01-09 NOTE — PLAN OF CARE
Alert and oriented x 4. VSS. On room air. . Denies pain. Dressings to BLE changed per order. Safety precautions in place. Call light within reach. Will continue to monitor.

## 2021-01-09 NOTE — CM/SW NOTE
Erica BOONE stating they are having the same issue with pt's insurance. Pt to call [facility] when she gets home to give them the updated policy numbers. The ones SW gave to the facility are not valid.  Pt stated she will ask her brother f

## 2021-01-09 NOTE — CM/SW NOTE
Due to the [facility] population in the hospital, patient has not been admitted, please observe COVID waiver.     The [facility] is requesting this documentation

## 2021-01-09 NOTE — CM/SW NOTE
The [facility] stated they can accept the pt under the medicare waiver. [facility] are saying the pt's Medicare is still coming back as a secondary. DUNCAN provided them w/the new number . Awaiting response. DON requested.

## 2021-01-12 NOTE — DISCHARGE SUMMARY
[facility] HOSPITALIST  DISCHARGE SUMMARY     Jenny Llanos Patient Status:  Observation    1950 MRN [id number]   [facility] 3SW-A Attending No att. providers found   Hosp Day # 0 PCP Iesha Gonzalez     Date of Admission:  CHANGE how you take these medications      Instructions Prescription details   Metoprolol Tartrate 50 MG Tabs  Commonly known as: LOPRESSOR  What changed: when to take this      Take 1 tablet (50 mg total) by mouth 2 (two) times daily.    Refills: 0 sounds.     -----------------------------------------------------------------------------------------------  PATIENT DISCHARGE INSTRUCTIONS: See electronic chart    Adrian Finn MD    Time spent:  > 30 minutes

## 2021-03-04 ENCOUNTER — LAB REQUISITION (OUTPATIENT)
Dept: LAB | Facility: HOSPITAL | Age: 71
End: 2021-03-04
Payer: MEDICARE

## 2021-03-04 DIAGNOSIS — N18.30 CHRONIC KIDNEY DISEASE, STAGE 3 UNSPECIFIED (HCC): ICD-10-CM

## 2021-03-04 DIAGNOSIS — I89.0 LYMPHEDEMA, NOT ELSEWHERE CLASSIFIED: ICD-10-CM

## 2021-03-04 DIAGNOSIS — I12.9 HYPERTENSIVE CHRONIC KIDNEY DISEASE WITH STAGE 1 THROUGH STAGE 4 CHRONIC KIDNEY DISEASE, OR UNSPECIFIED CHRONIC KIDNEY DISEASE: ICD-10-CM

## 2021-03-04 DIAGNOSIS — E87.6 HYPOKALEMIA: ICD-10-CM

## 2021-03-04 LAB — POTASSIUM SERPL-SCNC: 3.4 MMOL/L (ref 3.5–5.1)

## 2021-03-04 PROCEDURE — 84132 ASSAY OF SERUM POTASSIUM: CPT | Performed by: INTERNAL MEDICINE

## 2021-04-25 ENCOUNTER — HOSPITAL ENCOUNTER (INPATIENT)
Facility: HOSPITAL | Age: 71
LOS: 4 days | Discharge: NURSING, REHAB OR LONG-TERM CARE | DRG: 872 | End: 2021-04-29
Attending: EMERGENCY MEDICINE | Admitting: HOSPITALIST
Payer: MEDICARE

## 2021-04-25 ENCOUNTER — APPOINTMENT (OUTPATIENT)
Dept: GENERAL RADIOLOGY | Facility: HOSPITAL | Age: 71
DRG: 872 | End: 2021-04-25
Attending: EMERGENCY MEDICINE
Payer: MEDICARE

## 2021-04-25 DIAGNOSIS — I89.0 LYMPHEDEMA OF BOTH LOWER EXTREMITIES: ICD-10-CM

## 2021-04-25 DIAGNOSIS — L03.115 CELLULITIS OF RIGHT LEG: Primary | ICD-10-CM

## 2021-04-25 PROBLEM — D69.6 THROMBOCYTOPENIA (HCC): Status: ACTIVE | Noted: 2021-04-25

## 2021-04-25 PROBLEM — E87.20 METABOLIC ACIDOSIS: Status: ACTIVE | Noted: 2021-04-25

## 2021-04-25 PROBLEM — E87.1 HYPONATREMIA: Status: ACTIVE | Noted: 2021-04-25

## 2021-04-25 PROBLEM — E87.2 METABOLIC ACIDOSIS: Status: ACTIVE | Noted: 2021-04-25

## 2021-04-25 PROBLEM — R79.89 AZOTEMIA: Status: ACTIVE | Noted: 2021-04-25

## 2021-04-25 PROCEDURE — 71045 X-RAY EXAM CHEST 1 VIEW: CPT | Performed by: EMERGENCY MEDICINE

## 2021-04-25 PROCEDURE — 99223 1ST HOSP IP/OBS HIGH 75: CPT | Performed by: HOSPITALIST

## 2021-04-25 RX ORDER — CEFAZOLIN SODIUM/WATER 2 G/20 ML
2 SYRINGE (ML) INTRAVENOUS EVERY 12 HOURS
Status: DISCONTINUED | OUTPATIENT
Start: 2021-04-26 | End: 2021-04-27

## 2021-04-25 RX ORDER — METOPROLOL TARTRATE 50 MG/1
50 TABLET, FILM COATED ORAL EVERY EVENING
Status: ON HOLD | COMMUNITY
End: 2021-04-27

## 2021-04-25 RX ORDER — PREDNISOLONE ACETATE 10 MG/ML
1 SUSPENSION/ DROPS OPHTHALMIC NIGHTLY
Status: DISCONTINUED | OUTPATIENT
Start: 2021-04-25 | End: 2021-04-29

## 2021-04-25 RX ORDER — TRAMADOL HYDROCHLORIDE 50 MG/1
50 TABLET ORAL EVERY 6 HOURS PRN
Status: DISCONTINUED | OUTPATIENT
Start: 2021-04-25 | End: 2021-04-29

## 2021-04-25 RX ORDER — ONDANSETRON 2 MG/ML
4 INJECTION INTRAMUSCULAR; INTRAVENOUS EVERY 6 HOURS PRN
Status: DISCONTINUED | OUTPATIENT
Start: 2021-04-25 | End: 2021-04-29

## 2021-04-25 RX ORDER — LEVOTHYROXINE SODIUM 0.07 MG/1
75 TABLET ORAL
Status: DISCONTINUED | OUTPATIENT
Start: 2021-04-26 | End: 2021-04-29

## 2021-04-25 RX ORDER — BUMETANIDE 1 MG/1
1 TABLET ORAL DAILY
COMMUNITY
End: 2021-08-06

## 2021-04-25 RX ORDER — METOPROLOL TARTRATE 50 MG/1
50 TABLET, FILM COATED ORAL 2 TIMES DAILY
Status: DISCONTINUED | OUTPATIENT
Start: 2021-04-25 | End: 2021-04-26

## 2021-04-25 RX ORDER — BUMETANIDE 1 MG/1
1 TABLET ORAL DAILY
Status: DISCONTINUED | OUTPATIENT
Start: 2021-04-26 | End: 2021-04-26

## 2021-04-25 RX ORDER — ACETAMINOPHEN 325 MG/1
650 TABLET ORAL EVERY 6 HOURS PRN
Status: DISCONTINUED | OUTPATIENT
Start: 2021-04-25 | End: 2021-04-29

## 2021-04-25 RX ORDER — METOCLOPRAMIDE HYDROCHLORIDE 5 MG/ML
5 INJECTION INTRAMUSCULAR; INTRAVENOUS EVERY 8 HOURS PRN
Status: DISCONTINUED | OUTPATIENT
Start: 2021-04-25 | End: 2021-04-29

## 2021-04-25 RX ORDER — ONDANSETRON 2 MG/ML
4 INJECTION INTRAMUSCULAR; INTRAVENOUS EVERY 4 HOURS PRN
Status: DISCONTINUED | OUTPATIENT
Start: 2021-04-25 | End: 2021-04-25

## 2021-04-25 RX ORDER — ENOXAPARIN SODIUM 100 MG/ML
0.5 INJECTION SUBCUTANEOUS DAILY
Status: DISCONTINUED | OUTPATIENT
Start: 2021-04-25 | End: 2021-04-28

## 2021-04-25 RX ORDER — PANTOPRAZOLE SODIUM 20 MG/1
20 TABLET, DELAYED RELEASE ORAL
Status: DISCONTINUED | OUTPATIENT
Start: 2021-04-26 | End: 2021-04-29

## 2021-04-25 RX ORDER — CEFAZOLIN SODIUM/WATER 2 G/20 ML
2 SYRINGE (ML) INTRAVENOUS ONCE
Status: COMPLETED | OUTPATIENT
Start: 2021-04-25 | End: 2021-04-25

## 2021-04-25 RX ORDER — ATORVASTATIN CALCIUM 10 MG/1
10 TABLET, FILM COATED ORAL NIGHTLY
Status: DISCONTINUED | OUTPATIENT
Start: 2021-04-25 | End: 2021-04-29

## 2021-04-25 NOTE — ED PROVIDER NOTES
Patient Seen in: [facility] Emergency Department      History   Patient presents with:  Swelling Edema    Stated Complaint: ZAY lymphaedema     HPI/Subjective:   HPI    Patient is a 42-year-old female with history of lymphedema to the bilateral legs trauma. Extraocular muscles are intact. Pupils are equal and reactive to light. Oropharynx is pink and moist.  NECK: Neck is supple and nontender. The trachea is midline. LUNGS: Lungs are clear to auscultation bilaterally, respirations are unlabored.    H Please view results for these tests on the individual orders.    LACTIC ACID 3 HR POST POSITIVE   RAINBOW DRAW LAVENDER   RAINBOW DRAW LIGHT GREEN   RAINBOW DRAW GOLD   RAINBOW DRAW BLUE   BLOOD CULTURE   BLOOD CULTURE          XR CHEST AP PO comfortable with the plan as recommended. Remainder of the patient's emergency room stay was without incident.       Admission disposition: 4/25/2021  8:46 PM                            Disposition and Plan     Clinical Impression:  Cellulitis of right leg

## 2021-04-25 NOTE — ED INITIAL ASSESSMENT (HPI)
PT TO ED FROM HOME WITH ZAY LEG SWELLING THAT HAS INCREASED IN PAIN AND DECREASED PT MOBILITY.  PT HAS HOME HEALTH 2X WEEK, LAST VISIT Friday, REPORTED FEVER .0 YESTERDAY

## 2021-04-26 PROCEDURE — 99232 SBSQ HOSP IP/OBS MODERATE 35: CPT | Performed by: HOSPITALIST

## 2021-04-26 RX ORDER — METOPROLOL TARTRATE 50 MG/1
50 TABLET, FILM COATED ORAL NIGHTLY
Status: DISCONTINUED | OUTPATIENT
Start: 2021-04-26 | End: 2021-04-29

## 2021-04-26 RX ORDER — SODIUM CHLORIDE 9 MG/ML
INJECTION, SOLUTION INTRAVENOUS CONTINUOUS
Status: DISCONTINUED | OUTPATIENT
Start: 2021-04-26 | End: 2021-04-27

## 2021-04-26 RX ORDER — HYDROMORPHONE HYDROCHLORIDE 1 MG/ML
0.4 INJECTION, SOLUTION INTRAMUSCULAR; INTRAVENOUS; SUBCUTANEOUS EVERY 2 HOUR PRN
Status: DISCONTINUED | OUTPATIENT
Start: 2021-04-26 | End: 2021-04-29

## 2021-04-26 RX ORDER — HYDROMORPHONE HYDROCHLORIDE 1 MG/ML
0.2 INJECTION, SOLUTION INTRAMUSCULAR; INTRAVENOUS; SUBCUTANEOUS EVERY 2 HOUR PRN
Status: DISCONTINUED | OUTPATIENT
Start: 2021-04-26 | End: 2021-04-29

## 2021-04-26 RX ORDER — METOPROLOL TARTRATE 100 MG/1
100 TABLET ORAL
Status: DISCONTINUED | OUTPATIENT
Start: 2021-04-26 | End: 2021-04-29

## 2021-04-26 RX ORDER — HYDROMORPHONE HYDROCHLORIDE 1 MG/ML
0.8 INJECTION, SOLUTION INTRAMUSCULAR; INTRAVENOUS; SUBCUTANEOUS EVERY 2 HOUR PRN
Status: DISCONTINUED | OUTPATIENT
Start: 2021-04-26 | End: 2021-04-29

## 2021-04-26 NOTE — PROGRESS NOTES
NURSING ADMISSION NOTE      Patient admitted via Cart  Oriented to room. Safety precautions initiated. Bed in low position. Call light in reach. Received patient @ 2200. BP elevated, rest of VSS. Sepsis bolus for ED infusing.  Admission navigator

## 2021-04-26 NOTE — PROGRESS NOTES
[facility] HOSPITAL[facility]  Progress Note     Catia Gus Patient Status:  Inpatient    1950 MRN [id number]   [facility] 4NW-A Attending Adri Mcdermott[address] Day # 1 PCP Stefany Reina     Chief Complaint: swelling 45.1 mL/min (based on SCr of 0.96 mg/dL). No results for input(s): PTP, INR in the last 168 hours.          COVID-19 Lab Results    COVID-19  Lab Results   Component Value Date    COVID19 Not Detected 04/25/2021    COVID19 Not Detected 01/06/2021    COVI to TCC on discharge?: tbd  Estimated date of discharge: 2 days. SW consult    Plan of care discussed with pt RN. Complete IVF today. F/u on wound care recs. Needs to f/u with podiatrist regularly and needs ongoing edema mgmt.   She reports she did not fol

## 2021-04-26 NOTE — H&P
[facility] HOSPITAL[facility]  History and Physical     Divya Stinson Patient Status:  Emergency    1950 MRN [id number]   Location [facility] Attending Lee Robles MD   Hosp Day # 1 PCP Teena Gallardo     Chief Co mouth every evening., Disp: , Rfl:   Metoprolol Tartrate 50 MG Oral Tab, Take 100 mg by mouth every morning.  , Disp: , Rfl: 0  Cholecalciferol (VITAMIN D) 50 MCG (2000 UT) Oral Cap, Take 2,000 Units by mouth daily. , Disp: , Rfl:   Vitamin B-12 1000 MCG Or 34.9 mL/min (A) (based on SCr of 1.24 mg/dL (H)). No results for input(s): PTP, INR in the last 168 hours.     COVID-19 Lab Results    COVID-19  Lab Results   Component Value Date    COVID19 Not Detected 04/25/2021    COVID19 Not Detected 01/06/2021    C

## 2021-04-26 NOTE — PLAN OF CARE
A&Ox4. VSS. C/o generalized pain in the legs. Wound care consulted, awaiting for them to evaluate pt. Decreased mobility d/t leg swelling, PT consulted.

## 2021-04-27 PROCEDURE — 05HY33Z INSERTION OF INFUSION DEVICE INTO UPPER VEIN, PERCUTANEOUS APPROACH: ICD-10-PCS | Performed by: HOSPITALIST

## 2021-04-27 PROCEDURE — 99232 SBSQ HOSP IP/OBS MODERATE 35: CPT | Performed by: HOSPITALIST

## 2021-04-27 RX ORDER — CEFAZOLIN SODIUM/WATER 2 G/20 ML
2 SYRINGE (ML) INTRAVENOUS EVERY 8 HOURS
Status: DISCONTINUED | OUTPATIENT
Start: 2021-04-27 | End: 2021-04-29

## 2021-04-27 RX ORDER — BUMETANIDE 1 MG/1
1 TABLET ORAL DAILY
Status: DISCONTINUED | OUTPATIENT
Start: 2021-04-27 | End: 2021-04-29

## 2021-04-27 NOTE — PROGRESS NOTES
[facility]PITAL[facility]  Progress Note     Aftab Ferguson Patient Status:  Inpatient    1950 MRN [id number]   [facility] 4NW-A Attending Porsha Zhang [facility] Day # 2 PCP March Teresita     Chief Complaint: swelling Results    COVID-19  Lab Results   Component Value Date    COVID19 Not Detected 04/25/2021    COVID19 Not Detected 01/06/2021    COVID19 Not Detected 11/27/2020       Pro-Calcitonin  No results for input(s): PCT in the last 168 hours.     Cardiac  No result getting in and out of vehicle. PT eval.     Plan of care discussed with pt, MD, RN. ID/wound care eval.     SAVI Ramírez  1:16 PM     Internal medicine hospitalist addendum  Patient seen at bedside.   No overnight events.     Physical exam:  Kaiden Copeland

## 2021-04-27 NOTE — CONSULTS
INFECTIOUS DISEASE CONSULT NOTE    Noah Brizuela Patient Status:  Inpatient    1950 MRN [id number]   [facility] 4NW-A Attending Yareli Badillo [address] Day # 2 PCP 100 mg, Oral, Daily Beta Blocker  •  Metoprolol Tartrate (LOPRESSOR) tab 50 mg, 50 mg, Oral, Nightly  •  HYDROmorphone HCl (DILAUDID) 1 MG/ML injection 0.2 mg, 0.2 mg, Intravenous, Q2H PRN **OR** HYDROmorphone HCl (DILAUDID) 1 MG/ML injection 0.4 mg, 0.4 m temperature 98.6 °F (37 °C), temperature source Oral, resp. rate 18, height 5' 3\" (1.6 m), weight 280 lb (127 kg), SpO2 92 %. HEENT: no scleral icterus or conjunctival injection. Moist mucous membranes. No thrush  Neck: No lymphadenopathy. Supple.   Resp CHEST AP PORTABLE  (CPT=71045), 11/27/2020, 11:12 AM.  INDICATIONS:  ZAY lymphaedema  PATIENT STATED HISTORY: (As transcribed by Technologist)  Patient has a history of lymphedema and has pain and swelling in both of her legs.     FINDINGS:  There are scatt of this patient. Please do not hesitate to call if you have any questions. I will continue to follow with you and will make further recommendations based on her progress.     Robson Dobson  4/27/2021  3:04 PM

## 2021-04-27 NOTE — CONSULTS
[facility]  Report of Inpatient Wound Care Consultation     Nestorshruti Farrell Patient Status:  Inpatient    1950 MRN [id number]   [facility] 4NW-A Attending Faustina Hansena[address] Day # 2 PCP Yohana Anna odor, 100% red tissue appreciated. Feet warm, capillary refill at 4 seconds. Lipodermatosclerosis noted to CONSTANTINO BOWMAN. Cleansed and applied Aquacel Ag and Border foam. Recommend change every 48 hours, orders in computer for such.      D[employer]

## 2021-04-27 NOTE — PROGRESS NOTES
A&Ox4, RA, tele NSR. Purewick changed. Ancef via LFA IV, KVO. CO of minimal pain, tx w/ tylenol w/ relief. Repositioned legs per pt request. Call light within reach, bed in lowest position, VSS, will continue to monitor.

## 2021-04-27 NOTE — CM/SW NOTE
SW met with patient in room and introduced self and role. Patient is agreeable to BRANDYN as long as she isn't 'met with thousands in bills.' SW explained Medicare coverage of BRANDYN and patient expressed understanding.    CM completed DON request and referral sen

## 2021-04-27 NOTE — PLAN OF CARE
Pt. A&O x4. VSS. Afebrile. C/o generalized pain in the legs with movement. Awaiting wound care consult. Positive blood cultures; MD aware. ID consulted. Awaiting PT eval. Pt. Able to transfer with assist to the [facility], decreased mobility d/t leg swelling.  Mep goal  Description: INTERVENTIONS:  - Encourage pt to monitor pain and request assistance  - Assess pain using appropriate pain scale  - Administer analgesics based on type and severity of pain and evaluate response  - Implement non-pharmacological measures

## 2021-04-27 NOTE — CM/SW NOTE
04/27/21 1000   CM/SW Screening   Referral Source    Information Source Chart review;Nursing rounds   Patient's Mental Status Alert;Oriented   Patient's [address]   Patient lives with   (brother)   Patient Status Prior to [employer]

## 2021-04-27 NOTE — PHYSICAL THERAPY NOTE
PHYSICAL THERAPY EVALUATION - INPATIENT     Room Number: 418/418-A  Evaluation Date: 4/27/2021  Type of Evaluation: Initial  Physician Order: PT Eval and Treat    Presenting Problem: BLE edema, fever  Reason for Therapy: Mobility Dysfunction and Disc increased pain, limited her mobility/ambulation at home. Her brother assists with IADLs. SUBJECTIVE  Pt is agreeable to participate    Patient self-stated goal is get stronger to go home and be more independent    OBJECTIVE  Precautions:  Other (Comment) a railing?: A Lot       AM-PAC Score:  Raw Score: 17   Approx Degree of Impairment: 50.57%   Standardized Score (AM-PAC Scale): 42.13   CMS Modifier (G-Code): CK    FUNCTIONAL ABILITY STATUS  Gait Assessment   Gait Assistance: Minimum assistance  Distance complexity is considered low. These impairments and comorbidities manifest themselves as functional limitations in independent bed mobility, transfers, and gait.   The patient is below baseline and would benefit from skilled inpatient PT to address the abo

## 2021-04-28 ENCOUNTER — APPOINTMENT (OUTPATIENT)
Dept: CT IMAGING | Facility: HOSPITAL | Age: 71
DRG: 872 | End: 2021-04-28
Attending: PHYSICIAN ASSISTANT
Payer: MEDICARE

## 2021-04-28 PROCEDURE — 99232 SBSQ HOSP IP/OBS MODERATE 35: CPT | Performed by: HOSPITALIST

## 2021-04-28 PROCEDURE — 73701 CT LOWER EXTREMITY W/DYE: CPT | Performed by: PHYSICIAN ASSISTANT

## 2021-04-28 NOTE — PROGRESS NOTES
INFECTIOUS DISEASE PROGRESS NOTE    Noah Brizuela Patient Status:  Inpatient    1950 MRN [id number]   [facility] 4NW-A Attending [facility] Day # 3 PCP mg, Intravenous, Q8H PRN  •  traMADol HCl (ULTRAM) tab 50 mg, 50 mg, Oral, Q6H PRN    Review of Systems:  Completed. See pertinent positives and negatives in the the HPI. Physical Exam:  General: No acute distress. Alert and oriented x 3.   Vital signs: for input(s): [address], [address], [address], [address], [address], [address] [facility], [address] [facility], [facility] [address], [address], NITRITE, LEUUR, WBCUR, RBCUR, BACUR, EPIUR in the last 168 hours. Microbiology    Reviewed in EMR,   Hospital Encounter on 04/25/21   1.  BLOOD C admission- due to #2, resolved. PLAN:  - cont ancef, seems better, wbc nl and no more fevers.  Typically these are infections due to strep sp, should be well covered with this abx  - await repeat Bcx, ngtd   - local wound care per wound RN  - leg elevati

## 2021-04-28 NOTE — PLAN OF CARE
A&Ox4, RA, tele NSR. Pure wick in place. Co of RLE pain, tx w/ prn tramadol per [facility] w/ relief. Denies nausea or SOB. LFA IV KVO, IV ancef administered per MAR. Call light w/in reach, bed in lowest position, VSS, will continue to monitor.       Problem: SKIN

## 2021-04-28 NOTE — WOUND PROGRESS NOTE
[facility]  Inpatient Wound Care Contact Note    Leti Raygoza Patient Status:  Inpatient    1950 MRN [id number]   [facility] 4NW-A Attending Angela Palacios MD   [facility] Day # 3 PCP Acosta Bledsoe     Attempted to see

## 2021-04-28 NOTE — CM/SW NOTE
SW met with patient in room. Patient was friendly and responsive. Patient asked SW a few questions regarding BRANDYN but stated she is interested in going. SW answered all of patient's questions.  Patient currently leaning towards going to [facility] but wants

## 2021-04-28 NOTE — PROGRESS NOTES
[facility]  Progress Note     Sameeradennis Brizuela Patient Status:  Inpatient    1950 MRN [id number]   [facility] 4NW-A Attending Dr. Kathryn Alexis Day # 3 PCP Riki Neal     Chief Complaint: swelling     S: Patient w Creatinine Clearance: 61 mL/min (based on SCr of 0.71 mg/dL). No results for input(s): PTP, INR in the last 168 hours.          COVID-19 Lab Results    COVID-19  Lab Results   Component Value Date    COVID19 Not Detected 04/25/2021    COVID19 Not Detecte COVID testing is negative, may discontinue isolation: yes     Will the patient be referred to TCC on discharge?: tbd  Estimated date of discharge: 1-2 days, PT recs BRANDYN,    Needs to f/u with podiatrist regularly and needs ongoing lymphedema mgmt.   She repo

## 2021-04-29 VITALS
WEIGHT: 289.69 LBS | DIASTOLIC BLOOD PRESSURE: 55 MMHG | OXYGEN SATURATION: 97 % | SYSTOLIC BLOOD PRESSURE: 134 MMHG | HEIGHT: 63 IN | HEART RATE: 53 BPM | TEMPERATURE: 98 F | RESPIRATION RATE: 18 BRPM | BODY MASS INDEX: 51.33 KG/M2

## 2021-04-29 PROCEDURE — 99239 HOSP IP/OBS DSCHRG MGMT >30: CPT | Performed by: HOSPITALIST

## 2021-04-29 RX ORDER — METOPROLOL TARTRATE 50 MG/1
50 TABLET, FILM COATED ORAL NIGHTLY
Refills: 0 | Status: SHIPPED | COMMUNITY
Start: 2021-04-29

## 2021-04-29 RX ORDER — ENOXAPARIN SODIUM 100 MG/ML
0.5 INJECTION SUBCUTANEOUS DAILY
Status: DISCONTINUED | OUTPATIENT
Start: 2021-04-29 | End: 2021-04-29

## 2021-04-29 RX ORDER — CEFADROXIL 500 MG/1
1 CAPSULE ORAL 2 TIMES DAILY
Qty: 84 CAPSULE | Refills: 0 | Status: SHIPPED | OUTPATIENT
Start: 2021-04-29 | End: 2021-05-20

## 2021-04-29 NOTE — DISCHARGE SUMMARY
[facility] HOSPITALIST  DISCHARGE SUMMARY     Chick Cava Patient Status:  Inpatient    1950 MRN [id number]   [facility] 4NW-A Attending Marcell Holly MD   [address] Day # 4 PCP Manuela Kelly     Date of Admission: 2021 Recommendation:  LACE 29-58:  Moderate Risk of readmission after discharge from the hospital.    Procedures during hospitalization:  none    Consultants:  ID, wound care         Discharge Medications      START taking these medications      Instructions Pre Vitamin D 50 MCG (2000 UT) Caps      Take 2,000 Units by mouth daily.    Refills: 0           Where to Get Your Medications      Please  your prescriptions at the location directed by your doctor or nurse    Bring a paper prescription for each of t

## 2021-04-29 NOTE — PHYSICAL THERAPY NOTE
IP PT attempted. Pt politely refusing stating affects of diuretic. Pt with Bedelia Left currently, educated on benefits of mobility and offered to wear brief while up. Pt continues to refuse. Will re-attempt as schedule permits.

## 2021-04-29 NOTE — CM/SW NOTE
Patient discharging today to [facility]. Transportation set up via [employer] for 5:15pm pickup. PCS form completed and RN notified. SW met with patient and patient is aware of discharge plan.      Discharge plan:  [facility]  [address] [facility]

## 2021-04-29 NOTE — PLAN OF CARE
PROBLEM: BLE Cellulitis    ASSESSMENT: PT is A&Ox4. VSS, afebrile. Maintaining O2 sats >96% on RA. Tele, NSR/ST at times. Voids, purewick in place. Tolerating diet no c/o n/v/d this shift. Denies pain. IV SL, IV abx.  Dressings to BLE C/D/I, one on LLE crawley functional ability and stability  - Promote increasing activity/tolerance for mobility and gait  - Educate and engage patient/family in tolerated activity level and precautions    Outcome: Progressing     Problem: Impaired Activities of Daily Living  Goal: interventions  Outcome: Progressing  Goal: Patient/Family Short Term Goal  Description: Patient's Short Term Goal:   4/28 NOC: Sleep    Interventions:   - Cluster care  - See additional Care Plan goals for specific interventions  Outcome: Progressing

## 2021-04-29 NOTE — CM/SW NOTE
DUNCAN met with patient in room to discuss BRANDYN choice. Patient chose [facility]. SW reserved BRANDYN in A[place]. SW to continue following patient's discharge planning.      DAVINA Hawkins

## 2021-04-29 NOTE — CONSULTS
[facility]  Report of Inpatient Wound Care Consultation    Chick Cava Patient Status:  Inpatient    1950 MRN [id number]   [facility] 4NW-A Attending Marcell Holly MD   [facility] Day # 4 PCP Manuela Merritt

## 2021-04-29 NOTE — PROGRESS NOTES
Called patient at [phone]. Left message to return call to nurse triage line at [phone].    Stefania Thomason RN     I called and gave report to nurse Cristina Davila at [facility] rehab facility. Patient's physical and history were relayed to nursing staff and included past medical history, admitting diagnosis of cellulitis of right leg.  Patient will be picked up via [id number] at

## 2021-04-29 NOTE — PLAN OF CARE
Problem: SKIN/TISSUE INTEGRITY - ADULT  Goal: Skin integrity remains intact  Description: INTERVENTIONS  - Assess and document risk factors for pressure ulcer development  - Assess and document skin integrity  - Monitor for areas of redness and/or skin b Receiving IV Ancef. Dr. Kaylee Bishop clearing pt for DC on Duricef BID. Repeat blood cultures negative. DC instructions reviewed w/ pt. Report called to receiving RN at [facility]. 1730 Pt. Taken for DC via medicar to [facility].   All belongings sent w/ pt

## 2021-04-29 NOTE — PROGRESS NOTES
[facility]ITAL[facility]  Progress Note     Mark Ritter Patient Status:  Inpatient    1950 MRN [id number]   [facility] 4NW-A Attending Dr. Danielle Mcmanus Day # 4 PCP Tania Bhatti     Chief Complaint: swelling     S: Patient r Component Value Date    COVID19 Not Detected 04/25/2021    COVID19 Not Detected 01/06/2021    COVID19 Not Detected 11/27/2020       Pro-Calcitonin  No results for input(s): PCT in the last 168 hours.     Cardiac  No results for input(s): TROP, PBNP in the NT, ND, BS+  EXT: No c/c b/l edema with erythema improving     Agree with above  DC planning to BRANDYN   Medically cleared   abx per ID     Arun Hernandez MD

## 2021-04-29 NOTE — CM/SW NOTE
Dr Maria C Tucker asked sw to look into SARs that can do lymphadema therapy. SW inquired with accepting SARs and pt provided with this information.     Sg Cruz, [facility]  /Discharge Planner  [phone]

## 2021-04-29 NOTE — PLAN OF CARE
A/O. RA. NSR/SB on tele. BLE lymphedema. Cellulitis area marked. Voiding without difficulty. Purewick in place. Up to bathroom with standby assist and walker. Pt needs stephanie getting legs back in bed. Ambulated in cesar with OT.  Denied pain or need for pain needed  - Advance activity as appropriate  - Communicate ordered activity level and limitations with patient/family  Outcome: Not Progressing     Problem: PAIN - ADULT  Goal: Verbalizes/displays adequate comfort level or patient's stated pain goal  Descrip

## 2021-04-29 NOTE — PROGRESS NOTES
INFECTIOUS DISEASE PROGRESS NOTE    Aftab Ferguson Patient Status:  Inpatient    1950 MRN [id number]   [facility] 4NW-A Attending Pascale Escudero [facility] Day # 4 PCP HCl (ULTRAM) tab 50 mg, 50 mg, Oral, Q6H PRN    Review of Systems:  Completed. See pertinent positives and negatives in the the HPI. Physical Exam:  General: No acute distress. Alert and oriented x 3.   Vital signs: Blood pressure 134/55, pulse 53, tempe 04/25/21   1.  BLOOD CULTURE     Status: None (Preliminary result)    Collection Time: 04/27/21  7:59 AM    Specimen: Blood,peripheral   Result Value Ref Range    Blood Culture Result No Growth 2 Days N/A         Radiology: XR CHEST AP PORTABLE  (CPT=71045) standpoint. Will go to [facility], facility has a lymphedema specialist. Transition to PO cefadroxil on dc. Will give 3 more weeks of abx in view of severe lymphedema    Case and plan d/w pt. Will d/w .       Malcolm Jones MD

## 2021-05-03 ENCOUNTER — INITIAL APN SNF VISIT (OUTPATIENT)
Dept: INTERNAL MEDICINE CLINIC | Age: 71
End: 2021-05-03

## 2021-05-03 VITALS
DIASTOLIC BLOOD PRESSURE: 92 MMHG | HEART RATE: 58 BPM | SYSTOLIC BLOOD PRESSURE: 170 MMHG | RESPIRATION RATE: 18 BRPM | TEMPERATURE: 98 F | OXYGEN SATURATION: 95 %

## 2021-05-03 DIAGNOSIS — M62.81 MUSCLE WEAKNESS: ICD-10-CM

## 2021-05-03 DIAGNOSIS — K21.9 GASTROESOPHAGEAL REFLUX DISEASE WITHOUT ESOPHAGITIS: ICD-10-CM

## 2021-05-03 DIAGNOSIS — E78.5 HYPERLIPIDEMIA, UNSPECIFIED HYPERLIPIDEMIA TYPE: ICD-10-CM

## 2021-05-03 DIAGNOSIS — T14.90XA WOUNDS AND INJURIES: ICD-10-CM

## 2021-05-03 DIAGNOSIS — H54.7 VISUAL IMPAIRMENT: ICD-10-CM

## 2021-05-03 DIAGNOSIS — L03.115 CELLULITIS OF RIGHT LEG: ICD-10-CM

## 2021-05-03 DIAGNOSIS — A41.9 SEPSIS DUE TO CELLULITIS (HCC): Primary | ICD-10-CM

## 2021-05-03 DIAGNOSIS — I10 HYPERTENSION, UNSPECIFIED TYPE: ICD-10-CM

## 2021-05-03 DIAGNOSIS — Z74.09 IMPAIRED FUNCTIONAL MOBILITY, BALANCE, GAIT, AND ENDURANCE: ICD-10-CM

## 2021-05-03 DIAGNOSIS — D69.6 THROMBOCYTOPENIA (HCC): ICD-10-CM

## 2021-05-03 DIAGNOSIS — L03.90 SEPSIS DUE TO CELLULITIS (HCC): Primary | ICD-10-CM

## 2021-05-03 DIAGNOSIS — I89.0 LYMPHEDEMA OF BOTH LOWER EXTREMITIES: ICD-10-CM

## 2021-05-03 DIAGNOSIS — E07.9 DISORDER OF THYROID: ICD-10-CM

## 2021-05-03 PROCEDURE — 99310 SBSQ NF CARE HIGH MDM 45: CPT | Performed by: NURSE PRACTITIONER

## 2021-05-03 PROCEDURE — 1111F DSCHRG MED/CURRENT MED MERGE: CPT | Performed by: NURSE PRACTITIONER

## 2021-05-03 RX ORDER — TRAMADOL HYDROCHLORIDE 50 MG/1
50 TABLET ORAL EVERY 6 HOURS PRN
COMMUNITY
End: 2021-08-06

## 2021-05-03 RX ORDER — NYSTATIN 10B UNIT
POWDER (EA) MISCELLANEOUS AS DIRECTED
COMMUNITY

## 2021-05-03 NOTE — PROGRESS NOTES
Michael Maciel  : 1950  Age 79year old  female patient is admitted to Facility: [facility].     [address] [place] [address] Drive date:  21  Discharge date to [facility]:  21  ELOS:  14 days  Anticipated disch pancreatitis     6 times   • Lymphedema of both lower extremities     longstanding, unknown origin   • Muscle weakness    • Visual impairment      Past Surgical History:   Procedure Laterality Date   • CATARACT     • CHOLECYSTECTOMY       Family History 50 MCG/ACT Nasal Suspension 1 spray by Nasal route as needed. • SYNTHROID 75 MCG Oral Tab Take 75 mcg by mouth before breakfast.       • omeprazole 20 MG Oral Capsule Delayed Release Take 20 mg by mouth every morning.        • prednisoLONE acetate 1 % auscultation  CARDIOVASCULAR: S1, S2 normal, RRR; no S3, no S4; , no click, no murmur  ABDOMEN:  normal active BS+, soft, nondistended; no organomegaly, no masses; no bruits; nontender, no guarding, no rebound tenderness.   :Deferred  LYMPHATIC:---+ZAY LE [  ]  Dialysis      Hospital score:  0  Attribute:  [ ]Points if positive:    Low hemoglobin at discharge (<12g/dl)                                [1]  Followed by Oncology service                                              [2] 142; On 4/30-->Plts:  182  -On Lovenox for DVT proph.     -Monitor labs closely    Metabolic acidosis  -Monitor labs    HTN/HLD  -Vitals q shift  -Metoprolol 100 mg qam, and 50 mg qpm, hold for sbp <110 or hr <60  -On Bumex as stated above    Disorder of th

## 2021-05-07 ENCOUNTER — SNF VISIT (OUTPATIENT)
Dept: INTERNAL MEDICINE CLINIC | Age: 71
End: 2021-05-07

## 2021-05-07 VITALS
SYSTOLIC BLOOD PRESSURE: 124 MMHG | OXYGEN SATURATION: 94 % | DIASTOLIC BLOOD PRESSURE: 72 MMHG | TEMPERATURE: 97 F | RESPIRATION RATE: 18 BRPM | HEART RATE: 63 BPM

## 2021-05-07 DIAGNOSIS — I89.0 LYMPHEDEMA OF BOTH LOWER EXTREMITIES: ICD-10-CM

## 2021-05-07 DIAGNOSIS — A41.9 SEPSIS DUE TO CELLULITIS (HCC): Primary | ICD-10-CM

## 2021-05-07 DIAGNOSIS — L03.90 SEPSIS DUE TO CELLULITIS (HCC): Primary | ICD-10-CM

## 2021-05-07 DIAGNOSIS — L03.115 CELLULITIS OF RIGHT LEG: ICD-10-CM

## 2021-05-07 DIAGNOSIS — Z74.09 IMPAIRED FUNCTIONAL MOBILITY, BALANCE, GAIT, AND ENDURANCE: ICD-10-CM

## 2021-05-07 DIAGNOSIS — R52 PAIN: ICD-10-CM

## 2021-05-07 DIAGNOSIS — M62.81 MUSCLE WEAKNESS: ICD-10-CM

## 2021-05-07 PROCEDURE — 99308 SBSQ NF CARE LOW MDM 20: CPT | Performed by: NURSE PRACTITIONER

## 2021-05-07 NOTE — PROGRESS NOTES
Leti Raygoza, 11/27/1950, 79year old, female    Chief Complaint:  Patient presents with: Follow - Up: Sepsis,Cellulitis       Subjective:  80 y/o female with PMHx of HTN, Thyroid d/o, HTN, HLD and Tac[place] le lymphedema presented to ED with fever.   She tenderness. :Deferred  LYMPHATIC:---+ZAY LE  MUSCULOSKELETAL: ---+Right Knee pain.  Weakness R/T recent hospitalization/diagnoses/sequelae; will undergo therapies to rehab and improve strength, endurance and independence w/ ADLs  EXTREMITIES/VASCULAR:rad <110 or hr <60  -On Bumex as stated above     Disorder of thyroid  -Levothyroxine 75 mcq every day     GERD  -Omeprazole 20 mg qd     Visual Impairment  -Maintain glasses  -Prednisolone 1 gtt in right eye     Supplements  -B12 1000 mcq every day  -Vitamin

## 2021-05-11 ENCOUNTER — SNF DISCHARGE (OUTPATIENT)
Dept: INTERNAL MEDICINE CLINIC | Age: 71
End: 2021-05-11

## 2021-05-11 VITALS
OXYGEN SATURATION: 99 % | WEIGHT: 279.69 LBS | TEMPERATURE: 99 F | RESPIRATION RATE: 18 BRPM | BODY MASS INDEX: 50 KG/M2 | SYSTOLIC BLOOD PRESSURE: 157 MMHG | DIASTOLIC BLOOD PRESSURE: 86 MMHG | HEART RATE: 65 BPM

## 2021-05-11 DIAGNOSIS — L03.115 CELLULITIS OF RIGHT LEG: ICD-10-CM

## 2021-05-11 DIAGNOSIS — R52 PAIN: ICD-10-CM

## 2021-05-11 DIAGNOSIS — D69.6 THROMBOCYTOPENIA (HCC): ICD-10-CM

## 2021-05-11 DIAGNOSIS — L03.90 SEPSIS DUE TO CELLULITIS (HCC): Primary | ICD-10-CM

## 2021-05-11 DIAGNOSIS — Z74.09 IMPAIRED FUNCTIONAL MOBILITY, BALANCE, GAIT, AND ENDURANCE: ICD-10-CM

## 2021-05-11 DIAGNOSIS — M62.81 MUSCLE WEAKNESS: ICD-10-CM

## 2021-05-11 DIAGNOSIS — I89.0 LYMPHEDEMA OF BOTH LOWER EXTREMITIES: ICD-10-CM

## 2021-05-11 DIAGNOSIS — A41.9 SEPSIS DUE TO CELLULITIS (HCC): Primary | ICD-10-CM

## 2021-05-11 PROCEDURE — 99316 NF DSCHRG MGMT 30 MIN+: CPT | Performed by: NURSE PRACTITIONER

## 2021-05-12 NOTE — PROGRESS NOTES
Matias Knight, 11/27/1950, 79year old, female is being discharged from Facility: [facility]    Date of [address]    Date of Anticipated Discharge:5/12/21                            Admitting Diagnoses:Sepsis, Celluliti no TMG, no carotid bruits  BREAST: ---deferred  RESPIRATORY:clear to percussion and auscultation  CARDIOVASCULAR: S1, S2 normal, RRR; no S3, no S4; , no click, no murmur  ABDOMEN:  normal active BS+, soft, nondistended; no organomegaly, no masses; no bruit q6h prn  -Lidoderm d/c'd, per patient request  -Voltaren gel bid  -Anticipated discharge date:  5/12/21- to assist with discharge planning     DVT prophylaxis  -Lovenox 40 mg every day-discontinued, patient ambulating     Seasonal Allergies  -Flonase 1 s

## 2021-08-06 ENCOUNTER — APPOINTMENT (OUTPATIENT)
Dept: CT IMAGING | Facility: HOSPITAL | Age: 71
DRG: 603 | End: 2021-08-06
Attending: STUDENT IN AN ORGANIZED HEALTH CARE EDUCATION/TRAINING PROGRAM
Payer: MEDICARE

## 2021-08-06 ENCOUNTER — HOSPITAL ENCOUNTER (INPATIENT)
Facility: HOSPITAL | Age: 71
LOS: 5 days | Discharge: NURSING, REHAB OR LONG-TERM CARE | DRG: 603 | End: 2021-08-11
Attending: STUDENT IN AN ORGANIZED HEALTH CARE EDUCATION/TRAINING PROGRAM | Admitting: HOSPITALIST
Payer: MEDICARE

## 2021-08-06 DIAGNOSIS — D72.829 LEUKOCYTOSIS, UNSPECIFIED TYPE: ICD-10-CM

## 2021-08-06 DIAGNOSIS — R50.9 FEVER, UNSPECIFIED FEVER CAUSE: ICD-10-CM

## 2021-08-06 DIAGNOSIS — R19.7 NAUSEA VOMITING AND DIARRHEA: ICD-10-CM

## 2021-08-06 DIAGNOSIS — I89.0 LYMPHEDEMA: ICD-10-CM

## 2021-08-06 DIAGNOSIS — A41.9 SEPSIS WITHOUT ACUTE ORGAN DYSFUNCTION, DUE TO UNSPECIFIED ORGANISM (HCC): ICD-10-CM

## 2021-08-06 DIAGNOSIS — L03.119 CELLULITIS OF LOWER EXTREMITY, UNSPECIFIED LATERALITY: Primary | ICD-10-CM

## 2021-08-06 DIAGNOSIS — R11.2 NAUSEA VOMITING AND DIARRHEA: ICD-10-CM

## 2021-08-06 LAB
ALBUMIN SERPL-MCNC: 3.1 G/DL (ref 3.4–5)
ALBUMIN/GLOB SERPL: 0.7 {RATIO} (ref 1–2)
ALP LIVER SERPL-CCNC: 106 U/L
ALT SERPL-CCNC: 31 U/L
ANION GAP SERPL CALC-SCNC: 4 MMOL/L (ref 0–18)
AST SERPL-CCNC: 29 U/L (ref 15–37)
BASOPHILS # BLD AUTO: 0.01 X10(3) UL (ref 0–0.2)
BASOPHILS NFR BLD AUTO: 0.1 %
BILIRUB SERPL-MCNC: 1.2 MG/DL (ref 0.1–2)
BUN BLD-MCNC: 21 MG/DL (ref 7–18)
CALCIUM BLD-MCNC: 9 MG/DL (ref 8.5–10.1)
CHLORIDE SERPL-SCNC: 104 MMOL/L (ref 98–112)
CO2 SERPL-SCNC: 26 MMOL/L (ref 21–32)
CREAT BLD-MCNC: 0.95 MG/DL
EOSINOPHIL # BLD AUTO: 0.02 X10(3) UL (ref 0–0.7)
EOSINOPHIL NFR BLD AUTO: 0.1 %
ERYTHROCYTE [DISTWIDTH] IN BLOOD BY AUTOMATED COUNT: 13.2 %
GLOBULIN PLAS-MCNC: 4.5 G/DL (ref 2.8–4.4)
GLUCOSE BLD-MCNC: 111 MG/DL (ref 70–99)
GLUCOSE BLD-MCNC: 69 MG/DL (ref 70–99)
GLUCOSE BLD-MCNC: 84 MG/DL (ref 70–99)
HCT VFR BLD AUTO: 40.9 %
HGB BLD-MCNC: 13.6 G/DL
IMM GRANULOCYTES # BLD AUTO: 0.11 X10(3) UL (ref 0–1)
IMM GRANULOCYTES NFR BLD: 0.8 %
LACTATE SERPL-SCNC: 2 MMOL/L (ref 0.4–2)
LACTATE SERPL-SCNC: 2.5 MMOL/L (ref 0.4–2)
LIPASE SERPL-CCNC: 23 U/L (ref 73–393)
LYMPHOCYTES # BLD AUTO: 0.3 X10(3) UL (ref 1–4)
LYMPHOCYTES NFR BLD AUTO: 2.1 %
M PROTEIN MFR SERPL ELPH: 7.6 G/DL (ref 6.4–8.2)
MCH RBC QN AUTO: 31.6 PG (ref 26–34)
MCHC RBC AUTO-ENTMCNC: 33.3 G/DL (ref 31–37)
MCV RBC AUTO: 94.9 FL
MONOCYTES # BLD AUTO: 0.74 X10(3) UL (ref 0.1–1)
MONOCYTES NFR BLD AUTO: 5.2 %
NEUTROPHILS # BLD AUTO: 13.04 X10 (3) UL (ref 1.5–7.7)
NEUTROPHILS # BLD AUTO: 13.04 X10(3) UL (ref 1.5–7.7)
NEUTROPHILS NFR BLD AUTO: 91.7 %
OSMOLALITY SERPL CALC.SUM OF ELEC: 282 MOSM/KG (ref 275–295)
PLATELET # BLD AUTO: 168 10(3)UL (ref 150–450)
POTASSIUM SERPL-SCNC: 3.8 MMOL/L (ref 3.5–5.1)
RBC # BLD AUTO: 4.31 X10(6)UL
SARS-COV-2 RNA RESP QL NAA+PROBE: NOT DETECTED
SODIUM SERPL-SCNC: 134 MMOL/L (ref 136–145)
WBC # BLD AUTO: 14.2 X10(3) UL (ref 4–11)

## 2021-08-06 PROCEDURE — 99223 1ST HOSP IP/OBS HIGH 75: CPT | Performed by: HOSPITALIST

## 2021-08-06 PROCEDURE — 74177 CT ABD & PELVIS W/CONTRAST: CPT | Performed by: STUDENT IN AN ORGANIZED HEALTH CARE EDUCATION/TRAINING PROGRAM

## 2021-08-06 RX ORDER — METOPROLOL TARTRATE 100 MG/1
100 TABLET ORAL EVERY MORNING
Status: DISCONTINUED | OUTPATIENT
Start: 2021-08-07 | End: 2021-08-11

## 2021-08-06 RX ORDER — METOCLOPRAMIDE HYDROCHLORIDE 5 MG/ML
10 INJECTION INTRAMUSCULAR; INTRAVENOUS EVERY 8 HOURS PRN
Status: DISCONTINUED | OUTPATIENT
Start: 2021-08-06 | End: 2021-08-11

## 2021-08-06 RX ORDER — ACETAMINOPHEN 500 MG
1000 TABLET ORAL ONCE
Status: COMPLETED | OUTPATIENT
Start: 2021-08-06 | End: 2021-08-06

## 2021-08-06 RX ORDER — POLYETHYLENE GLYCOL 3350 17 G/17G
17 POWDER, FOR SOLUTION ORAL DAILY PRN
Status: DISCONTINUED | OUTPATIENT
Start: 2021-08-06 | End: 2021-08-11

## 2021-08-06 RX ORDER — FLUTICASONE PROPIONATE 50 MCG
1 SPRAY, SUSPENSION (ML) NASAL DAILY PRN
Status: DISCONTINUED | OUTPATIENT
Start: 2021-08-06 | End: 2021-08-11

## 2021-08-06 RX ORDER — LEVOTHYROXINE SODIUM 0.07 MG/1
75 TABLET ORAL
Status: DISCONTINUED | OUTPATIENT
Start: 2021-08-07 | End: 2021-08-11

## 2021-08-06 RX ORDER — ATORVASTATIN CALCIUM 10 MG/1
10 TABLET, FILM COATED ORAL NIGHTLY
Status: DISCONTINUED | OUTPATIENT
Start: 2021-08-06 | End: 2021-08-11

## 2021-08-06 RX ORDER — ACETAMINOPHEN 325 MG/1
650 TABLET ORAL EVERY 6 HOURS PRN
Status: DISCONTINUED | OUTPATIENT
Start: 2021-08-06 | End: 2021-08-11

## 2021-08-06 RX ORDER — CEFAZOLIN SODIUM/WATER 2 G/20 ML
2 SYRINGE (ML) INTRAVENOUS ONCE
Status: COMPLETED | OUTPATIENT
Start: 2021-08-06 | End: 2021-08-06

## 2021-08-06 RX ORDER — ONDANSETRON 2 MG/ML
4 INJECTION INTRAMUSCULAR; INTRAVENOUS EVERY 6 HOURS PRN
Status: DISCONTINUED | OUTPATIENT
Start: 2021-08-06 | End: 2021-08-11

## 2021-08-06 RX ORDER — BISACODYL 10 MG
10 SUPPOSITORY, RECTAL RECTAL
Status: DISCONTINUED | OUTPATIENT
Start: 2021-08-06 | End: 2021-08-11

## 2021-08-06 RX ORDER — SODIUM PHOSPHATE, DIBASIC AND SODIUM PHOSPHATE, MONOBASIC 7; 19 G/133ML; G/133ML
1 ENEMA RECTAL ONCE AS NEEDED
Status: DISCONTINUED | OUTPATIENT
Start: 2021-08-06 | End: 2021-08-11

## 2021-08-06 RX ORDER — METOPROLOL TARTRATE 50 MG/1
50 TABLET, FILM COATED ORAL NIGHTLY
Status: DISCONTINUED | OUTPATIENT
Start: 2021-08-06 | End: 2021-08-11

## 2021-08-06 RX ORDER — FUROSEMIDE 40 MG/1
40 TABLET ORAL DAILY
Status: ON HOLD | COMMUNITY
End: 2021-08-10

## 2021-08-06 RX ORDER — CLINDAMYCIN PHOSPHATE 600 MG/50ML
600 INJECTION INTRAVENOUS EVERY 8 HOURS
Status: DISCONTINUED | OUTPATIENT
Start: 2021-08-06 | End: 2021-08-06

## 2021-08-06 RX ORDER — FUROSEMIDE 10 MG/ML
40 INJECTION INTRAMUSCULAR; INTRAVENOUS DAILY
Status: DISCONTINUED | OUTPATIENT
Start: 2021-08-06 | End: 2021-08-11

## 2021-08-06 RX ORDER — PANTOPRAZOLE SODIUM 20 MG/1
20 TABLET, DELAYED RELEASE ORAL
Status: DISCONTINUED | OUTPATIENT
Start: 2021-08-07 | End: 2021-08-11

## 2021-08-06 RX ORDER — LEVOTHYROXINE SODIUM 0.07 MG/1
75 TABLET ORAL
COMMUNITY

## 2021-08-06 RX ORDER — MELATONIN
3 NIGHTLY PRN
Status: DISCONTINUED | OUTPATIENT
Start: 2021-08-06 | End: 2021-08-11

## 2021-08-06 RX ORDER — NAPHAZOLINE/ZINC SULF/GLYCERIN 0.012-0.25
1 DROPS OPHTHALMIC (EYE) DAILY PRN
COMMUNITY

## 2021-08-06 RX ORDER — HEPARIN SODIUM 5000 [USP'U]/ML
7500 INJECTION, SOLUTION INTRAVENOUS; SUBCUTANEOUS EVERY 8 HOURS SCHEDULED
Status: DISCONTINUED | OUTPATIENT
Start: 2021-08-06 | End: 2021-08-11

## 2021-08-06 RX ORDER — CEFAZOLIN SODIUM/WATER 2 G/20 ML
2 SYRINGE (ML) INTRAVENOUS EVERY 8 HOURS
Status: DISCONTINUED | OUTPATIENT
Start: 2021-08-06 | End: 2021-08-07

## 2021-08-06 NOTE — ED INITIAL ASSESSMENT (HPI)
Bilateral lower leg swelling , redness and blisters since 3 days . vomitted xonce today nauseated mild  Lower abdominal pain .  febrile

## 2021-08-06 NOTE — H&P
[facility] H[facility]PITAL[facility]  History and Physical      Patient Status:  Emergency    1950 MRN [id number]   Location [address] Attending Lalita Drake MD   Hosp Day # 0 PCP Demetrius Gandhi     Chief C NAUSEA ONLY    Comment:Morphine    Medications:  No current facility-administered medications on file prior to encounter. furosemide 40 MG Oral Tab, Take 40 mg by mouth daily. , Disp: , Rfl:   Carboxymethylcellul-Glycerin (CLEAR EYES FOR DRY EYES) 1-0.25 % tenderness or deformity. Abdomen: Soft, nontender, nondistended. Positive bowel sounds. No rebound, guarding or organomegaly. Neurologic: No focal neurological deficits. CNII-XII grossly intact. Musculoskeletal: Moves all extremities.   Extremities: No

## 2021-08-06 NOTE — ED PROVIDER NOTES
Patient Seen in: [facility] Emergency Department      History   Patient presents with:  Cellulitis    Stated Complaint: cellulitis    HPI/Subjective:   HPI    Patient is a 55-year-old female presenting to the emergency department for evaluation of l /68   Pulse 68   Resp 16   Temp (!) 101.6 °F (38.7 °C)   Temp src Temporal   SpO2 98 %   O2 Device None (Room air)       Current:BP (!) 161/96   Pulse 71   Temp (!) 101.6 °F (38.7 °C) (Temporal)   Resp 12   Ht 160 cm (5' 3\")   Wt 127 kg   SpO2 90% following components:    POC Glucose 69 (*)     All other components within normal limits   CBC W/ DIFFERENTIAL - Abnormal; Notable for the following components:    WBC 14.2 (*)     Neutrophil Absolute Prelim 13.04 (*)     Neutrophil Absolute 13.04 (*) patient.   Admission disposition: 8/6/2021  3:38 PM                  Disposition and Plan     Clinical Impression:  Cellulitis of lower extremity, unspecified laterality  (primary encounter diagnosis)  Fever, unspecified fever cause  Sepsis without acute or

## 2021-08-07 LAB
ANION GAP SERPL CALC-SCNC: 7 MMOL/L (ref 0–18)
BASOPHILS # BLD AUTO: 0.02 X10(3) UL (ref 0–0.2)
BASOPHILS NFR BLD AUTO: 0.1 %
BILIRUB UR QL STRIP.AUTO: NEGATIVE
BUN BLD-MCNC: 20 MG/DL (ref 7–18)
CALCIUM BLD-MCNC: 8 MG/DL (ref 8.5–10.1)
CHLORIDE SERPL-SCNC: 103 MMOL/L (ref 98–112)
CO2 SERPL-SCNC: 24 MMOL/L (ref 21–32)
CREAT BLD-MCNC: 0.84 MG/DL
EOSINOPHIL # BLD AUTO: 0 X10(3) UL (ref 0–0.7)
EOSINOPHIL NFR BLD AUTO: 0 %
ERYTHROCYTE [DISTWIDTH] IN BLOOD BY AUTOMATED COUNT: 13.6 %
GLUCOSE BLD-MCNC: 92 MG/DL (ref 70–99)
GLUCOSE UR STRIP.AUTO-MCNC: NEGATIVE MG/DL
HCT VFR BLD AUTO: 35.7 %
HGB BLD-MCNC: 12.1 G/DL
IMM GRANULOCYTES # BLD AUTO: 0.15 X10(3) UL (ref 0–1)
IMM GRANULOCYTES NFR BLD: 1 %
KETONES UR STRIP.AUTO-MCNC: NEGATIVE MG/DL
LYMPHOCYTES # BLD AUTO: 0.21 X10(3) UL (ref 1–4)
LYMPHOCYTES NFR BLD AUTO: 1.4 %
MCH RBC QN AUTO: 31.9 PG (ref 26–34)
MCHC RBC AUTO-ENTMCNC: 33.9 G/DL (ref 31–37)
MCV RBC AUTO: 94.2 FL
MONOCYTES # BLD AUTO: 0.28 X10(3) UL (ref 0.1–1)
MONOCYTES NFR BLD AUTO: 1.8 %
NEUTROPHILS # BLD AUTO: 14.87 X10 (3) UL (ref 1.5–7.7)
NEUTROPHILS # BLD AUTO: 14.87 X10(3) UL (ref 1.5–7.7)
NEUTROPHILS NFR BLD AUTO: 95.7 %
NITRITE UR QL STRIP.AUTO: NEGATIVE
OSMOLALITY SERPL CALC.SUM OF ELEC: 280 MOSM/KG (ref 275–295)
PH UR STRIP.AUTO: 5 [PH] (ref 5–8)
PLATELET # BLD AUTO: 119 10(3)UL (ref 150–450)
POTASSIUM SERPL-SCNC: 3.3 MMOL/L (ref 3.5–5.1)
PROT UR STRIP.AUTO-MCNC: 30 MG/DL
RBC # BLD AUTO: 3.79 X10(6)UL
SODIUM SERPL-SCNC: 134 MMOL/L (ref 136–145)
SP GR UR STRIP.AUTO: >1.03 (ref 1–1.03)
UROBILINOGEN UR STRIP.AUTO-MCNC: <2 MG/DL
WBC # BLD AUTO: 15.5 X10(3) UL (ref 4–11)

## 2021-08-07 PROCEDURE — 99232 SBSQ HOSP IP/OBS MODERATE 35: CPT | Performed by: HOSPITALIST

## 2021-08-07 RX ORDER — TRAMADOL HYDROCHLORIDE 50 MG/1
50 TABLET ORAL EVERY 6 HOURS PRN
Status: DISCONTINUED | OUTPATIENT
Start: 2021-08-07 | End: 2021-08-11

## 2021-08-07 RX ORDER — LEVOFLOXACIN 5 MG/ML
750 INJECTION, SOLUTION INTRAVENOUS EVERY 24 HOURS
Status: DISCONTINUED | OUTPATIENT
Start: 2021-08-07 | End: 2021-08-07

## 2021-08-07 NOTE — PLAN OF CARE
Pt A&Ox4. RA. NSR. Pt denies any MARINA, CP at this time. Pt complaining of unable to move L. Leg as easily as before. Bilateral LE severe lymphedema, wounds on doc legs,  weeping, dressings changed L.  Leg noticeable erythema, marker used by ID to outline red own health  - Refer to Case Management Department for coordinating discharge planning if the patient needs post-hospital services based on physician/LIP order or complex needs related to functional status, cognitive ability or social support system  Outcom

## 2021-08-07 NOTE — PROGRESS NOTES
The patient has stable vital signs, and she rates her pain level at 5 out of 10. She declined to eat a sandwich due to nausea. Zofran administered per MD order.

## 2021-08-07 NOTE — PROGRESS NOTES
[facility] Pharmacy Note:  Renal Adjustment for levofloxacin (Dyke Fret)    Aftab Ferguson is a 79year old patient who has been prescribed levofloxacin (LEVAQUIN) 500 mg every 24 hrs.   The estimated creatinine clearance is 51.6 mL/min (based on SCr of 0.84 mg

## 2021-08-07 NOTE — PROGRESS NOTES
[facility]  Progress Note     Catia Weber Patient Status:  Inpatient    1950 MRN [id number]   [facility] 3NW-A Attending Ernst Murphy MD   Hosp Day # 1 PCP Stefany Reina     Chief Complaint: abd pain, leg sade Oral Before breakfast   • metoprolol tartrate  100 mg Oral QAM   • metoprolol tartrate  50 mg Oral Nightly   • pantoprazole  20 mg Oral QAM AC   • Heparin Sodium (Porcine)  7,500 Units Subcutaneous Q8H [facility]   • furosemide  40 mg Intravenous Daily       ASSE

## 2021-08-07 NOTE — CONSULTS
..                                                           INFECTIOUS DISEASE CONSULT NOTE    Gurpreet Reece Patient Status:  Inpatient    1950 MRN [id number]   [facility] 3NW-A Attending Alhaji Aaron MD   [facility] Day # 1 PCP Blanche Herrera the past, tolerates             cefazolin (8/6/21)  Tetracycline            HIVES  Alc-Benzyl Alc-Sulf*    RASH  Sulfa Antibiotics       RASH  Tetracyclines & Rel*    RASH  Opioid Analgesics       NAUSEA ONLY    Comment:Morphine    Medications:    Current distress. Alert and oriented x 3. Moderately obese   Vital signs: Blood pressure 138/49, pulse 68, temperature 99.4 °F (37.4 °C), temperature source Oral, resp. rate 18, height 5' 3\" (1.6 m), weight 280 lb (127 kg), SpO2 95 %.   HEENT: Moist mucous membran severe lower legs with   4.Venous ulceration wound left lower leg  On going for many weeks - with secondary infection actively oozing  Due to edema   5. Leukocytosis reactive   6. H/O HTN< HLD,   7.  Allergy to Amox, Sulfa, Erythromycin rash and Hives

## 2021-08-07 NOTE — PROGRESS NOTES
08/07/21 1133   Clinical Encounter Type   Visited With Patient; Health care provider  (RN Wilfrido Murguia)   Routine Visit   (Responded to request for consult - advance directive)     Per RN patient is decisional.  provided patient education re.  PoA Heal

## 2021-08-07 NOTE — PROGRESS NOTES
[facility] Pharmacy Note: Antimicrobial Weight Based Dose Adjustment for: cefazolin (ANCEF)    Valentine Dougherty is a 79year old patient who has been prescribed cefazolin (ANCEF) 1000 mg every 8 hours.       Estimated Creatinine Clearance: 45.6 mL/min (based on

## 2021-08-07 NOTE — PROGRESS NOTES
[facility] Pharmacy Note:  Anticoagulation Weight Dose Adjustment for heparin    Randa Leon is a 79year old patient who has been prescribed heparin 5000 units every 8 hours. Estimated Creatinine Clearance: 45.6 mL/min (based on SCr of 0.95 mg/dL).  B

## 2021-08-07 NOTE — PLAN OF CARE
Problem: PAIN - ADULT  Goal: Verbalizes/displays adequate comfort level or patient's stated pain goal  Description: INTERVENTIONS:  - Encourage pt to monitor pain and request assistance  - Assess pain using appropriate pain scale  - Administer analges appropriate  - Identify discharge learning needs (meds, wound care, etc)  - Arrange for interpreters to assist at discharge as needed  - Consider post-discharge preferences of patient/family/discharge partner  - Complete POLST form as appropriate  - Assess

## 2021-08-08 LAB
ANION GAP SERPL CALC-SCNC: 8 MMOL/L (ref 0–18)
BASOPHILS # BLD AUTO: 0.01 X10(3) UL (ref 0–0.2)
BASOPHILS NFR BLD AUTO: 0.1 %
BUN BLD-MCNC: 25 MG/DL (ref 7–18)
CALCIUM BLD-MCNC: 8.1 MG/DL (ref 8.5–10.1)
CHLORIDE SERPL-SCNC: 101 MMOL/L (ref 98–112)
CO2 SERPL-SCNC: 23 MMOL/L (ref 21–32)
CREAT BLD-MCNC: 0.99 MG/DL
EOSINOPHIL # BLD AUTO: 0.07 X10(3) UL (ref 0–0.7)
EOSINOPHIL NFR BLD AUTO: 0.6 %
ERYTHROCYTE [DISTWIDTH] IN BLOOD BY AUTOMATED COUNT: 14 %
GLUCOSE BLD-MCNC: 104 MG/DL (ref 70–99)
HCT VFR BLD AUTO: 34.1 %
HGB BLD-MCNC: 11.2 G/DL
IMM GRANULOCYTES # BLD AUTO: 0.08 X10(3) UL (ref 0–1)
IMM GRANULOCYTES NFR BLD: 0.6 %
LYMPHOCYTES # BLD AUTO: 0.57 X10(3) UL (ref 1–4)
LYMPHOCYTES NFR BLD AUTO: 4.5 %
MCH RBC QN AUTO: 31.3 PG (ref 26–34)
MCHC RBC AUTO-ENTMCNC: 32.8 G/DL (ref 31–37)
MCV RBC AUTO: 95.3 FL
MONOCYTES # BLD AUTO: 0.68 X10(3) UL (ref 0.1–1)
MONOCYTES NFR BLD AUTO: 5.4 %
NEUTROPHILS # BLD AUTO: 11.24 X10 (3) UL (ref 1.5–7.7)
NEUTROPHILS # BLD AUTO: 11.24 X10(3) UL (ref 1.5–7.7)
NEUTROPHILS NFR BLD AUTO: 88.8 %
OSMOLALITY SERPL CALC.SUM OF ELEC: 279 MOSM/KG (ref 275–295)
PLATELET # BLD AUTO: 122 10(3)UL (ref 150–450)
POTASSIUM SERPL-SCNC: 3.5 MMOL/L (ref 3.5–5.1)
RBC # BLD AUTO: 3.58 X10(6)UL
SODIUM SERPL-SCNC: 132 MMOL/L (ref 136–145)
WBC # BLD AUTO: 12.7 X10(3) UL (ref 4–11)

## 2021-08-08 PROCEDURE — 99232 SBSQ HOSP IP/OBS MODERATE 35: CPT | Performed by: HOSPITALIST

## 2021-08-08 NOTE — PLAN OF CARE
Upon assessment pt is a&o 4, can be forgetful at times. VSS and afebrile. RA, . WALLACE no CPAP. Tele- NSR. Lymphedema/ redness to BLE. Redness marked on left leg. Pt tolerating regular diet, denies n/v. Purewick and brief in place.  Pt denies pain at this t ADULT - FALL  Goal: Free from fall injury  Description: INTERVENTIONS:  - Assess pt frequently for physical needs  - Identify cognitive and physical deficits and behaviors that affect risk of falls. - Madill fall precautions as indicated by assessment. for patient to ask for assistance (call light)  - Provide an  as needed  - Communicate barriers and strategies to overcome with those who interact with patient  Outcome: Progressing

## 2021-08-08 NOTE — PHYSICAL THERAPY NOTE
PHYSICAL THERAPY EVALUATION - INPATIENT     Room Number: 316/316-A  Evaluation Date: 8/8/2021  Type of Evaluation: Initial  Physician Order: PT Eval and Treat    Presenting Problem: cellulitis of LE  Reason for Therapy: Mobility Dysfunction and Disch just hurts so bad\" while moving LE. Patient self-stated goal is to have less pain. OBJECTIVE  Precautions:  Other (Comment) (increase pain to touch B LE)  Fall Risk: High fall risk    WEIGHT BEARING RESTRICTION  Weight Bearing Restriction: None ok to be seen. Pt received in bed and requires max verbal cues for encouragement to participate with PT. Pt educated on role, goals, and course of PT session. Pt denies dizziness throughout session.   Pt has increased pain with any movement or touch to B patient presents with the following impairments decrease strength, decrease activity tolerance, decrease balance, and decrease functional mobility skills due to pain. Functional outcome measures completed include AMPAC.   Based on this evaluation, patient'

## 2021-08-08 NOTE — PROGRESS NOTES
[facility]  Progress Note     Kam Patella Patient Status:  Inpatient    1950 MRN [id number]   [facility] 3NW-A Attending Anant Javier MD   Hosp Day # 2 PCP Cy Stinson     Chief Complaint: abd pain, leg sade cefepime  2 g Intravenous Q12H   • atorvastatin  10 mg Oral Nightly   • Levothyroxine Sodium  75 mcg Oral Before breakfast   • metoprolol tartrate  100 mg Oral QAM   • metoprolol tartrate  50 mg Oral Nightly   • pantoprazole  20 mg Oral QAM AC   • Heparin

## 2021-08-08 NOTE — PLAN OF CARE
Assumed care of patient at 0730, tele on and hr in the 60's at this time. A+0x4 and follows all commands. Tolerating diet without nausea, turned q 2 hrs in bed, patient encouraged to get up to chair and physical therapy working with patient at this time.  D

## 2021-08-08 NOTE — PLAN OF CARE
Assumed pt care, pt resting in bed. Denies pain at the moment. Dressing cdi. Poc updated, pt verbalized understanding.

## 2021-08-09 LAB
ANION GAP SERPL CALC-SCNC: 6 MMOL/L (ref 0–18)
BASOPHILS # BLD AUTO: 0.02 X10(3) UL (ref 0–0.2)
BASOPHILS NFR BLD AUTO: 0.2 %
BUN BLD-MCNC: 22 MG/DL (ref 7–18)
CALCIUM BLD-MCNC: 8.3 MG/DL (ref 8.5–10.1)
CHLORIDE SERPL-SCNC: 103 MMOL/L (ref 98–112)
CO2 SERPL-SCNC: 26 MMOL/L (ref 21–32)
CREAT BLD-MCNC: 0.81 MG/DL
EOSINOPHIL # BLD AUTO: 0.36 X10(3) UL (ref 0–0.7)
EOSINOPHIL NFR BLD AUTO: 4.1 %
ERYTHROCYTE [DISTWIDTH] IN BLOOD BY AUTOMATED COUNT: 13.8 %
GLUCOSE BLD-MCNC: 91 MG/DL (ref 70–99)
HCT VFR BLD AUTO: 35.2 %
HGB BLD-MCNC: 11.9 G/DL
IMM GRANULOCYTES # BLD AUTO: 0.03 X10(3) UL (ref 0–1)
IMM GRANULOCYTES NFR BLD: 0.3 %
LYMPHOCYTES # BLD AUTO: 0.64 X10(3) UL (ref 1–4)
LYMPHOCYTES NFR BLD AUTO: 7.2 %
MCH RBC QN AUTO: 31.8 PG (ref 26–34)
MCHC RBC AUTO-ENTMCNC: 33.8 G/DL (ref 31–37)
MCV RBC AUTO: 94.1 FL
MONOCYTES # BLD AUTO: 0.72 X10(3) UL (ref 0.1–1)
MONOCYTES NFR BLD AUTO: 8.2 %
NEUTROPHILS # BLD AUTO: 7.06 X10 (3) UL (ref 1.5–7.7)
NEUTROPHILS # BLD AUTO: 7.06 X10(3) UL (ref 1.5–7.7)
NEUTROPHILS NFR BLD AUTO: 80 %
OSMOLALITY SERPL CALC.SUM OF ELEC: 283 MOSM/KG (ref 275–295)
PLATELET # BLD AUTO: 143 10(3)UL (ref 150–450)
POTASSIUM SERPL-SCNC: 3.6 MMOL/L (ref 3.5–5.1)
RBC # BLD AUTO: 3.74 X10(6)UL
SODIUM SERPL-SCNC: 135 MMOL/L (ref 136–145)
WBC # BLD AUTO: 8.8 X10(3) UL (ref 4–11)

## 2021-08-09 PROCEDURE — 99232 SBSQ HOSP IP/OBS MODERATE 35: CPT | Performed by: HOSPITALIST

## 2021-08-09 NOTE — OCCUPATIONAL THERAPY NOTE
OCCUPATIONAL THERAPY EVALUATION - INPATIENT     Room Number: 790/350-M  Evaluation Date: 8/9/2021  Type of Evaluation: Initial  Presenting Problem: Cellulitis B BLE    Physician Order: IP Consult to Occupational Therapy  Reason for Therapy: ADL/IADL Dysfun reports being mod I with ADLs and functional mobility with use of RW in the home. Patient reports she lives with her brother but does not want brother to be her caregiver.   Patient reports she has not been able to wear shoes that fit d/t edema in legs and currently need…  -   Putting on and taking off regular lower body clothing?: A Lot  -   Bathing (including washing, rinsing, drying)?: A Lot  -   Toileting, which includes using toilet, bedpan or urinal? : A Lot  -   Putting on and taking off regular upper to address lymphedema but focus was on healing wounds. Patient reports no compression has been applied to the legs and no other edema reduction intervention has been addressed since April/2021 and patient is not clear on why this is the case.     BLE EDEMA participation. The patient is functioning below her previous functional level and would benefit from skilled inpatient OT to address the above deficits, maximizing patient’s ability to return safely to her prior level of function.     Subacute rehab is exercise program).     Additional Goals:  Patient will tolerate light compression to RLE > 8 hours to assist in edema reduction and improve tissue health

## 2021-08-09 NOTE — CONSULTS
[facility]  Report of Inpatient Wound Care Consultation    Neena Holguin Patient Status:  Inpatient    1950 MRN [id number]   [facility] 3NW-A Attending Bonnie Castrejon MD   [facility] Day # 3 PCP Warden Vegas     Reason f Assessed/Time First Assessed: 08/06/21 3110   Present on Hospital Admission: Yes  Primary Wound Type: (c) Other (comment)  Location: Tibial  Wound Location Orientation: Left; Anterior      Assessments 8/9/2021 11:31 AM   Wound Image     Drainage Amount Larg Assessment Edema   Wound Granulation Tissue Pink;Firm   Wound Bed Granulation (%) 50 %   Wound Bed Epithelium (%) 50 %   Wound Odor None        Recommendations/Wound Cleaning and Dressings: 1. Left anterior and posterior leg  Wound cleansing:  Cleanse wi

## 2021-08-09 NOTE — CM/SW NOTE
08/09/21 1370   Choice of Post-Acute Provider   Informed patient of right to choose their preferred provider Yes   List of appropriate post-acute services provided to patient/family with quality data Yes     List of accepting facilities given.  Has quest

## 2021-08-09 NOTE — PLAN OF CARE
Patient is alert and oriented x3  Turn in bed as able  Voiding per purewick  Tolerating diet  Pain controlled   Dressings changed per wound RN       Problem: PAIN - ADULT  Goal: Verbalizes/displays adequate comfort level or patient's stated pain goal  Desc and caregiver  - Include patient/family/discharge partner in discharge planning  - Arrange for needed discharge resources and transportation as appropriate  - Identify discharge learning needs (meds, wound care, etc)  - Arrange for interpreters to assist a

## 2021-08-09 NOTE — CM/SW NOTE
08/09/21 1129   CM/SW Referral Data   Referral Source    Reason for Referral Discharge planning   Informant Patient   Pertinent Medical Hx   Does patient have an established PCP?  Yes   Patient [address]   Number

## 2021-08-09 NOTE — PLAN OF CARE
Assumed care of pt at 2300. Upon assessment pt is a&o 4, can be forgetful at times. VSS and afebrile. RA, . WALLACE no CPAP. Tele- NSR. Lymphedema/ redness to BLE. Redness marked on left leg. Pt tolerating regular diet, denies n/v.  Purewick and brief in allan guidelines  Outcome: Progressing     Problem: SAFETY ADULT - FALL  Goal: Free from fall injury  Description: INTERVENTIONS:  - Assess pt frequently for physical needs  - Identify cognitive and physical deficits and behaviors that affect risk of falls.   - I time for understanding and response  - Establish method for patient to ask for assistance (call light)  - Provide an  as needed  - Communicate barriers and strategies to overcome with those who interact with patient  Outcome: Progressing

## 2021-08-09 NOTE — PROGRESS NOTES
[facility]  Progress Note     Ramiro Hawk Patient Status:  Inpatient    1950 MRN [id number]   [facility] 3NW-A Attending Alonzo Carlton MD   Hosp Day # 3 PCP Ruthy Bailey     Chief Complaint: abd pain, leg sade displayed. Estimated Creatinine Clearance: 53.5 mL/min (based on SCr of 0.81 mg/dL). No results for input(s): PTP, INR in the last 168 hours. No results for input(s): TROP, CK in the last 168 hours.          Imaging: Imaging data reviewed in Epi

## 2021-08-09 NOTE — PROGRESS NOTES
[address] [facility]  TEL: [phone]  FAX: [phone]    Serena Eng Patient Status:  Inpatient    1950 MRN [id number]   [facility] 3NW-A Attending Yordan Alexander MD   Hosp Day # 3 PCP Merrill Glass 3.6      < >  --  103 101 103   CO2 26.0   < >  --  24.0 23.0 26.0   ALKPHO 106  --   --   --   --   --    AST  --   --  29  --   --   --    ALT 31  --   --   --   --   --    BILT 1.2  --   --   --   --   --    TP 7.6  --   --   --   --   --     < > Patient states she vomited a few times this morning. CONTRAST USED:  100cc of Omnipaque 350  FINDINGS:  LUNG BASE:  Atelectasis. LIVER:  Homogeneous enhancement. BILIARY:  Cholecystectomy. No biliary ductal dilatation.  PANCREAS:  Homogeneous enhancement abx. May be able to go home on levaquin once leg much better, but duration and final choice based on cx and progress   -Wound care to see   -needs better lymphedema control chronically. Will see if our lymphedema specialist can see while here  D./w pt.   Wi

## 2021-08-10 PROCEDURE — 99232 SBSQ HOSP IP/OBS MODERATE 35: CPT | Performed by: HOSPITALIST

## 2021-08-10 RX ORDER — TORSEMIDE 20 MG/1
20 TABLET ORAL DAILY
Qty: 30 TABLET | Refills: 0 | Status: SHIPPED | OUTPATIENT
Start: 2021-08-10

## 2021-08-10 RX ORDER — TRAMADOL HYDROCHLORIDE 50 MG/1
50 TABLET ORAL EVERY 6 HOURS PRN
Qty: 20 TABLET | Refills: 0 | Status: SHIPPED | OUTPATIENT
Start: 2021-08-10

## 2021-08-10 NOTE — PROGRESS NOTES
Pt alert and orient x4. Pt has easy non labored breathing on ra. cpox and telemetry in place. Pt tolerating regular diet. Bilateral legs with lymph. Dressings in place. C/d/i. Bilateral legs elevated on pillows. Pure wick in place for urine collection.  Iv

## 2021-08-10 NOTE — PLAN OF CARE
Pt A&Ox4. RA. Pt denies any MARINA, CP at this time. BLE lymphedema with venous ulcers x3. Wound care orders q 48hrs. Pt voiding in adequate amounts, lasix given per order. IV abx per order. PRN pain meds given. Up w/ SB and a walker.  Safety precautions in pl PLANNING  Goal: Discharge to home or other facility with appropriate resources  Description: INTERVENTIONS:  - Identify barriers to discharge w/pt and caregiver  - Include patient/family/discharge partner in discharge planning  - Arrange for needed dischar

## 2021-08-10 NOTE — PROGRESS NOTES
[facility]  Progress Note     Aicha Conti Patient Status:  Inpatient    1950 MRN [id number]   [facility] 3NW-A Attending Danny Soriano MD   Hosp Day # 4 PCP Lyn Green     Chief Complaint: abd pain, leg sade displayed. Estimated Creatinine Clearance: 53.5 mL/min (based on SCr of 0.81 mg/dL). No results for input(s): PTP, INR in the last 168 hours. No results for input(s): TROP, CK in the last 168 hours.          Imaging: Imaging data reviewed in Epi

## 2021-08-10 NOTE — CM/SW NOTE
08/10/21 0905   Discharge disposition   Expected discharge disposition [address] [facility] Main Provider [address] [facility]   Discharge transportation [facility] [phone] has been requested to arrange ambulance for will ca

## 2021-08-10 NOTE — CM/SW NOTE
08/10/21 0904   Choice of Post-Acute Provider   Patient/family choice [facility] with patient regarding anticipated dc today. Requests dc to ary. Updates sent. Accepted by Mike Berry. Await medical clearance.     Ayla Marin RN, Case Ma

## 2021-08-10 NOTE — PROGRESS NOTES
[address] [place] [address]  TEL: [phone]  FAX: [phone]    Mark Ritter Patient Status:  Inpatient    1950 MRN [id number]   [facility] 3NW-A Attending Mercedes Domingo MD   [facility] Day # 4 SAMMIE Gyu < >  --  24.0 23.0 26.0   ALKPHO 106  --   --   --   --   --    AST  --   --  29  --   --   --    ALT 31  --   --   --   --   --    BILT 1.2  --   --   --   --   --    TP 7.6  --   --   --   --   --     < > = values in this interval not displayed. to tolerate compression. D/w pt, Dr. Aldair Graham and RN. Will [address] Jenae Lopez PA-C    ID ATTENDING ADDENDUM     Pt seen an examined independently. Chart reviewed. Agree with above. Note has been reviewed by me and modified as needed.   Exam an

## 2021-08-11 VITALS
TEMPERATURE: 98 F | BODY MASS INDEX: 49.61 KG/M2 | WEIGHT: 280 LBS | OXYGEN SATURATION: 97 % | HEIGHT: 63 IN | DIASTOLIC BLOOD PRESSURE: 64 MMHG | SYSTOLIC BLOOD PRESSURE: 134 MMHG | RESPIRATION RATE: 20 BRPM | HEART RATE: 52 BPM

## 2021-08-11 PROCEDURE — 05HY33Z INSERTION OF INFUSION DEVICE INTO UPPER VEIN, PERCUTANEOUS APPROACH: ICD-10-PCS | Performed by: PHYSICIAN ASSISTANT

## 2021-08-11 PROCEDURE — 99239 HOSP IP/OBS DSCHRG MGMT >30: CPT | Performed by: INTERNAL MEDICINE

## 2021-08-11 NOTE — DISCHARGE SUMMARY
[facility] [place] [facility]OSPITAL[facility]  DISCHARGE SUMMARY     Michael Maciel Patient Status:  Inpatient    1950 MRN [id number]   [facility] 3NW-A Attending Sarika Simmons MD   Hosp Day # 5 PCP Michelle Gudino     Date of Admission: 2021 medication. Stop taking on: August 25, 2021  Quantity: 42 each  Refills: 0     torsemide 20 MG Tabs  Commonly known as: DEMADEX      Take 1 tablet (20 mg total) by mouth daily.    Quantity: 30 tablet  Refills: 0     traMADol 50 MG Tabs  Commonly known as: [id number]    Phone: [phone]   · torsemide 20 MG Tabs     Please  your prescriptions at the location directed by your doctor or nurse    Bring a paper prescription for each of these medications  · Cefepime HCl 2 g in sodium chloride 100 mL  · traMAD

## 2021-08-11 NOTE — PHYSICAL THERAPY NOTE
PHYSICAL THERAPY TREATMENT NOTE - INPATIENT    Room Number: 686/351-G     Session: 1   Number of Visits to Meet Established Goals: 4    Presenting Problem: cellulitis of LE    History related to current admission: pt admitted to ED from home on 8/6/21 due +    ACTIVITY TOLERANCE                         O2 WALK       AM-PAC '6-Clicks' INPATIENT SHORT FORM - BASIC MOBILITY  How much difficulty does the patient currently have. ..  -   Turning over in bed (including adjusting bedclothes, sheets and blankets)?: A ongoing IP PT to maximize functional independence. The AM-PAC '6-Clicks' Inpatient Basic Mobility Short Form was completed and this patient is demonstrating a 61.29% degree of impairment in mobility.  Research supports that patients with this level of impa

## 2021-08-11 NOTE — PLAN OF CARE
A & O x 4. RA. Tolerating diet. C/o nausea from movement, Zofran given with relief. Incont of urine at times; purewick in place. Reports passing gas; last charted bm 8/7. Declined need for medication at this time. Up with assist to commode.  ZAY drsg change pt/family on patient safety including physical limitations  - Instruct pt to call for assistance with activity based on assessment  - Modify environment to reduce risk of injury  - Provide assistive devices as appropriate  - Consider OT/PT consult to stephanie prescribed range  Description: INTERVENTIONS:  - Monitor Blood Glucose as ordered  - Assess for signs and symptoms of hyperglycemia and hypoglycemia  - Administer ordered medications to maintain glucose within target range  - Assess barriers to adequate nu

## 2021-08-11 NOTE — PROGRESS NOTES
[facility]  Progress Note     Eusebia Dhaliwal Patient Status:  Inpatient    1950 MRN [id number]   [facility] 3NW-A Attending Clari Escobar MD   Hosp Day # 5 PCP Seth Hercules     Chief Complaint: abd pain, leg sade --   --   --   --     < > = values in this interval not displayed. Estimated Creatinine Clearance: 53.5 mL/min (based on SCr of 0.81 mg/dL). No results for input(s): PTP, INR in the last 168 hours.     No results for input(s): TROP, CK in the last

## 2021-08-11 NOTE — CM/SW NOTE
08/10/21 0905   Discharge disposition   Expected discharge disposition [address] [facility] Provider BD [facility]   Discharge transportation [address]     Will need 2 weeks iv cefepime, pending midline placement.  Confirmed w/andrez at bro

## 2021-08-11 NOTE — PLAN OF CARE
Pt a&ox4, VSS, afebrile. NSR on telemetry. Voiding adequately. IV antibiotics infusing per mar. Bilateral lower extremity lymphedema. Dressings to bilateral legs c/d/i. Legs elevated on pillows and repositioned frequently per pt request. Tolerating diet.  P Progressing     Problem: SAFETY ADULT - FALL  Goal: Free from fall injury  Description: INTERVENTIONS:  - Assess pt frequently for physical needs  - Identify cognitive and physical deficits and behaviors that affect risk of falls.   - [place] fall precaut and response  - Establish method for patient to ask for assistance (call light)  - Provide an  as needed  - Communicate barriers and strategies to overcome with those who interact with patient  Outcome: Progressing

## 2021-08-11 NOTE — PROGRESS NOTES
[address] [place] [address]  TEL: [phone]  FAX: [phone]    Ramiro Hawk Patient Status:  Inpatient    1950 MRN [id number]   [facility] 3NW-A Attending Alonzo Carlton MD   [facility] Day # 5 PCP Ramírez Nicole CREATSERUM 0.95   < >  --  0.84 0.99 0.81   GFRAA 70   < >  --  81 67 85   GFRNAA 61   < >  --  71 58* 74   CA 9.0   < >  --  8.0* 8.1* 8.3*   ALB 3.1*  --   --   --   --   --    *   < >  --  134* 132* 135*   K  --    < > 3.8 3.3* 3.5 3.6    Leukocytosis reactive, now resolved  6. H/O HTN, HLD  7. Allergy to Amox, Sulfa, Erythromycin rash and Hives      PLAN:  -cont IV cefepime  -follow leg cx  -follow repeat blood cx to doc resolution, ngtd  -needs better lymphedema control chronically.  Lymph

## 2021-08-12 ENCOUNTER — SNF VISIT (OUTPATIENT)
Dept: INTERNAL MEDICINE CLINIC | Age: 71
End: 2021-08-12

## 2021-08-12 DIAGNOSIS — E78.5 HYPERLIPIDEMIA, UNSPECIFIED HYPERLIPIDEMIA TYPE: ICD-10-CM

## 2021-08-12 DIAGNOSIS — R60.0 BILATERAL LEG EDEMA: ICD-10-CM

## 2021-08-12 DIAGNOSIS — E07.9 DISORDER OF THYROID: ICD-10-CM

## 2021-08-12 DIAGNOSIS — R52 PAIN: ICD-10-CM

## 2021-08-12 DIAGNOSIS — R78.81 BACTEREMIA OF UNDETERMINED ETIOLOGY: Primary | ICD-10-CM

## 2021-08-12 DIAGNOSIS — Z74.09 IMPAIRED FUNCTIONAL MOBILITY, BALANCE, GAIT, AND ENDURANCE: ICD-10-CM

## 2021-08-12 DIAGNOSIS — L03.116 CELLULITIS OF LEFT LOWER LEG: ICD-10-CM

## 2021-08-12 DIAGNOSIS — L97.919 ULCERS OF BOTH LOWER LEGS (HCC): ICD-10-CM

## 2021-08-12 DIAGNOSIS — L97.929 ULCERS OF BOTH LOWER LEGS (HCC): ICD-10-CM

## 2021-08-12 DIAGNOSIS — I89.0 LYMPHEDEMA OF BOTH LOWER EXTREMITIES: ICD-10-CM

## 2021-08-12 DIAGNOSIS — M62.81 MUSCLE WEAKNESS: ICD-10-CM

## 2021-08-12 DIAGNOSIS — I10 ESSENTIAL HYPERTENSION: ICD-10-CM

## 2021-08-12 DIAGNOSIS — I10 HYPERTENSION, UNSPECIFIED TYPE: ICD-10-CM

## 2021-08-12 LAB — P AERUGINOSA DNA BLD POS NAA+NON-PROBE: DETECTED

## 2021-08-12 PROCEDURE — 1111F DSCHRG MED/CURRENT MED MERGE: CPT | Performed by: NURSE PRACTITIONER

## 2021-08-12 PROCEDURE — 99310 SBSQ NF CARE HIGH MDM 45: CPT | Performed by: NURSE PRACTITIONER

## 2021-08-13 VITALS
RESPIRATION RATE: 20 BRPM | SYSTOLIC BLOOD PRESSURE: 146 MMHG | WEIGHT: 181.38 LBS | TEMPERATURE: 98 F | DIASTOLIC BLOOD PRESSURE: 79 MMHG | OXYGEN SATURATION: 97 % | HEART RATE: 70 BPM | BODY MASS INDEX: 32 KG/M2

## 2021-08-14 NOTE — PROGRESS NOTES
Deb Majano. APN Encounter 8/12/21 3:15 pm (late entry    Mrs. Quan Wolfe seen in her room after wound MD visit. Left leg cellulitis and wounds were cared for by Dr. Iwona Adams. Patient is exhausted ambulating from bathroom to her bed.     Dayanara Fischer CHOLECYSTECTOMY       ALLERGIES:  Amoxicillin             HIVES    Comment:Tolerates cefazolin (8/6/21)  Erythromycin            HIVES  Penicillins             HIVES    Comment:Tolerates rocephin in the past, tolerates             cefazolin (8/6/21)  Tetra SpO2 97%   BMI 32.13 kg/m²      REVIEW OF SYSTEMS: \"please leave me alone\"  GENERAL HEALTH: c/o weakness lower extremities and pain L leg. WOUNDS: C/o pain L calf s/p seen by wound MD today. EYES: denies blurry vision. HENT: denies hearing loss.   RES 08/09/2021     08/09/2021    CO2 26.0 08/09/2021    [phone] 08/09/2021     [facility] medical records, notes, lab and imaging results reviewed. Medication reconciliation completed. Patient's Readmission Score=5.   Intermediate Risk for Readmissi

## 2021-08-19 ENCOUNTER — SNF VISIT (OUTPATIENT)
Dept: INTERNAL MEDICINE CLINIC | Age: 71
End: 2021-08-19

## 2021-08-19 DIAGNOSIS — R52 PAIN: ICD-10-CM

## 2021-08-19 DIAGNOSIS — L97.929 ULCERS OF BOTH LOWER LEGS (HCC): ICD-10-CM

## 2021-08-19 DIAGNOSIS — I10 HYPERTENSION, UNSPECIFIED TYPE: ICD-10-CM

## 2021-08-19 DIAGNOSIS — L97.919 ULCERS OF BOTH LOWER LEGS (HCC): ICD-10-CM

## 2021-08-19 DIAGNOSIS — Z74.09 IMPAIRED FUNCTIONAL MOBILITY, BALANCE, GAIT, AND ENDURANCE: ICD-10-CM

## 2021-08-19 DIAGNOSIS — R78.81 BACTEREMIA OF UNDETERMINED ETIOLOGY: Primary | ICD-10-CM

## 2021-08-19 DIAGNOSIS — T14.90XA WOUNDS AND INJURIES: ICD-10-CM

## 2021-08-19 DIAGNOSIS — I10 ESSENTIAL HYPERTENSION: ICD-10-CM

## 2021-08-19 DIAGNOSIS — L03.116 CELLULITIS OF LEFT LOWER LEG: ICD-10-CM

## 2021-08-19 DIAGNOSIS — I89.0 LYMPHEDEMA OF BOTH LOWER EXTREMITIES: ICD-10-CM

## 2021-08-19 PROCEDURE — 99309 SBSQ NF CARE MODERATE MDM 30: CPT | Performed by: NURSE PRACTITIONER

## 2021-08-24 ENCOUNTER — SNF VISIT (OUTPATIENT)
Dept: INTERNAL MEDICINE CLINIC | Age: 71
End: 2021-08-24

## 2021-08-24 DIAGNOSIS — L97.919 ULCERS OF BOTH LOWER LEGS (HCC): ICD-10-CM

## 2021-08-24 DIAGNOSIS — Z74.09 IMPAIRED FUNCTIONAL MOBILITY, BALANCE, GAIT, AND ENDURANCE: ICD-10-CM

## 2021-08-24 DIAGNOSIS — T14.90XA WOUNDS AND INJURIES: ICD-10-CM

## 2021-08-24 DIAGNOSIS — M62.81 MUSCLE WEAKNESS: ICD-10-CM

## 2021-08-24 DIAGNOSIS — L97.929 ULCERS OF BOTH LOWER LEGS (HCC): ICD-10-CM

## 2021-08-24 DIAGNOSIS — R78.81 BACTEREMIA OF UNDETERMINED ETIOLOGY: Primary | ICD-10-CM

## 2021-08-24 DIAGNOSIS — L03.116 CELLULITIS OF LEFT LOWER LEG: ICD-10-CM

## 2021-08-24 DIAGNOSIS — I10 ESSENTIAL HYPERTENSION: ICD-10-CM

## 2021-08-24 DIAGNOSIS — I89.0 LYMPHEDEMA OF BOTH LOWER EXTREMITIES: ICD-10-CM

## 2021-08-24 PROCEDURE — 99309 SBSQ NF CARE MODERATE MDM 30: CPT | Performed by: NURSE PRACTITIONER

## 2021-08-26 ENCOUNTER — SNF VISIT (OUTPATIENT)
Dept: INTERNAL MEDICINE CLINIC | Age: 71
End: 2021-08-26

## 2021-08-26 DIAGNOSIS — M62.81 MUSCLE WEAKNESS: ICD-10-CM

## 2021-08-26 DIAGNOSIS — T14.90XA WOUNDS AND INJURIES: ICD-10-CM

## 2021-08-26 DIAGNOSIS — L03.116 CELLULITIS OF LEFT LOWER LEG: ICD-10-CM

## 2021-08-26 DIAGNOSIS — R78.81 BACTEREMIA OF UNDETERMINED ETIOLOGY: Primary | ICD-10-CM

## 2021-08-26 DIAGNOSIS — Z74.09 IMPAIRED FUNCTIONAL MOBILITY, BALANCE, GAIT, AND ENDURANCE: ICD-10-CM

## 2021-08-26 DIAGNOSIS — I10 ESSENTIAL HYPERTENSION: ICD-10-CM

## 2021-08-26 DIAGNOSIS — I89.0 LYMPHEDEMA OF BOTH LOWER EXTREMITIES: ICD-10-CM

## 2021-08-26 PROCEDURE — 99309 SBSQ NF CARE MODERATE MDM 30: CPT | Performed by: NURSE PRACTITIONER

## 2021-08-29 VITALS
HEART RATE: 58 BPM | TEMPERATURE: 97 F | WEIGHT: 246.63 LBS | OXYGEN SATURATION: 96 % | BODY MASS INDEX: 44 KG/M2 | RESPIRATION RATE: 18 BRPM | DIASTOLIC BLOOD PRESSURE: 66 MMHG | SYSTOLIC BLOOD PRESSURE: 140 MMHG

## 2021-08-29 VITALS
SYSTOLIC BLOOD PRESSURE: 140 MMHG | OXYGEN SATURATION: 95 % | RESPIRATION RATE: 19 BRPM | BODY MASS INDEX: 50 KG/M2 | TEMPERATURE: 97 F | HEART RATE: 59 BPM | WEIGHT: 281.19 LBS | DIASTOLIC BLOOD PRESSURE: 80 MMHG

## 2021-08-29 VITALS
SYSTOLIC BLOOD PRESSURE: 125 MMHG | HEART RATE: 57 BPM | TEMPERATURE: 96 F | RESPIRATION RATE: 29 BRPM | OXYGEN SATURATION: 96 % | WEIGHT: 248 LBS | BODY MASS INDEX: 44 KG/M2 | DIASTOLIC BLOOD PRESSURE: 64 MMHG

## 2021-08-29 RX ORDER — NYSTATIN 100000 [USP'U]/G
POWDER TOPICAL 4 TIMES DAILY
COMMUNITY

## 2021-08-30 NOTE — PROGRESS NOTES
Deb Majano. APN Encounter 8/19/21 1:45 pm (late entry)    Mrs. Hong seen at bedside after therapy; PT/OT. She is being seen by Casimiro Calvillo, lymphedema therapist to provide lymphedema therapy on a regular basis.   Ambulates very slow to and from b impairment      Past Surgical History:   Procedure Laterality Date   • CATARACT     • CHOLECYSTECTOMY       ALLERGIES:  Amoxicillin             HIVES    Comment:Tolerates cefazolin (8/6/21)  Erythromycin            HIVES  Penicillins             HIVES    C Readmission. Assessment/Plan:    L LE Cellulitis/Bacteremia:   IV Cefepime 2G q 8 hrs thru 8/26/21 via Midline catheter. Daily wound care. Wound MD to see weekly. Weekly labs. PT/OT    Bilateral Lymphedema, severe and chronic.    Torsemide 20 mg rola

## 2021-08-30 NOTE — PROGRESS NOTES
Denzel Henry. APN Encounter 8/26/21 3 pm (late entry)     Discharge Date:  8/28/21. Mrs. Viera seen at bedside in her room. She is excited to discharge Saturday 8/28/21.   She was hoping for discharging today but agrees with another day of ther procceure   • Disorder of thyroid     uses synthroid   • High blood pressure    • High cholesterol    • Hx of pancreatitis     6 times   • Lymphedema of both lower extremities     longstanding, unknown origin   • Muscle weakness    • Visual impairment Lab Results: 8/24/21  WBC 4.87. HGB 11.8. Crit 36.5. Plts 189. Na 139. K 3.7. Cl 101. CO2 27. BUN 28, Creat 0.8.gfr 71. Patient's Readmission Score=5. Intermediate Risk for Readmission.     Assessment/Plan:    L LE Cellulitis/Bacteremia:   IV Cefe

## 2021-08-30 NOTE — PROGRESS NOTES
Bruce Horta. CLIFFORD Encounter 8/24/21 2 pm (late entry)    Mrs. Hong seen in her room after lunch and after therapy. She is improving with the therapy sessions and appreciates the lymphedema therapy she is receiving in rehab.   Still ambulates sl longstanding, unknown origin   • Muscle weakness    • Visual impairment      Past Surgical History:   Procedure Laterality Date   • CATARACT     • CHOLECYSTECTOMY       ALLERGIES:  Amoxicillin             HIVES    Comment:Tolerates cefazolin (8/6/21)  E Readmission. Assessment/Plan:    L LE Cellulitis/Bacteremia:   IV Cefepime 2G q 8 hrs thru 8/26/21 via Midline catheter. Daily wound care. Wound MD to see weekly. PT/OT    Bilateral Lymphedema, severe and chronic.    Torsemide 20 mg daily; taking 10 b

## 2021-10-22 ENCOUNTER — TELEPHONE (OUTPATIENT)
Dept: ORTHOPEDICS CLINIC | Facility: CLINIC | Age: 71
End: 2021-10-22

## 2021-10-22 NOTE — TELEPHONE ENCOUNTER
Attempted to reach patient.  Left a detailed message with  recommendation to see Karin Bo or Sue
Patient called requesting to have her nail's trimmed. Patient notified  is no longer doing nail care .  Patient would like a Podiatrist referral, please advise on referral for  podiatrist.
no

## 2021-12-01 ENCOUNTER — OFFICE VISIT (OUTPATIENT)
Dept: PODIATRY CLINIC | Facility: CLINIC | Age: 71
End: 2021-12-01
Payer: MEDICARE

## 2021-12-01 DIAGNOSIS — I89.0 LYMPHEDEMA OF BOTH LOWER EXTREMITIES: ICD-10-CM

## 2021-12-01 DIAGNOSIS — B35.1 ONYCHOMYCOSIS: ICD-10-CM

## 2021-12-01 DIAGNOSIS — N18.30 STAGE 3 CHRONIC KIDNEY DISEASE, UNSPECIFIED WHETHER STAGE 3A OR 3B CKD (HCC): Primary | ICD-10-CM

## 2021-12-01 PROCEDURE — 99203 OFFICE O/P NEW LOW 30 MIN: CPT | Performed by: STUDENT IN AN ORGANIZED HEALTH CARE EDUCATION/TRAINING PROGRAM

## 2021-12-01 PROCEDURE — 11721 DEBRIDE NAIL 6 OR MORE: CPT | Performed by: STUDENT IN AN ORGANIZED HEALTH CARE EDUCATION/TRAINING PROGRAM

## 2021-12-01 NOTE — PATIENT INSTRUCTIONS
-Discussed importance of proper pedal hygiene and regular foot checks.  -Continue following with primary doctor for management of lymphedema.  -Patient to monitor for acute signs of infection and seek immediate medical attention if any signs of infection o

## 2021-12-01 NOTE — PROGRESS NOTES
[facility], [facility]  Progress Note    Randa Leon is a 70year old female. Patient presents with:  New Patient: Patient is a 67year old female unable to trim her own nails- which a long, thick and discolored. Patient is not a diabetic. Take 100 mg by mouth every morning. 0   • Cholecalciferol (VITAMIN D) 50 MCG (2000 UT) Oral Cap Take 2,000 Units by mouth daily. • Vitamin B-12 1000 MCG Oral Tab Take 1,000 mcg by mouth daily.      • atorvastatin 10 MG Oral Tab Take 10 mg by mouth ni elongated, thickened, dystrophic, with subungual debris x10. Compressive wraps intact to lower extremities for management of lymphedema. 2. Vascular: Unable to palpate pedal pulses due to significant lymphedema to lower extremities.    3. Musculoskeleta

## 2022-02-09 ENCOUNTER — OFFICE VISIT (OUTPATIENT)
Dept: PODIATRY CLINIC | Facility: CLINIC | Age: 72
End: 2022-02-09
Payer: MEDICARE

## 2022-02-09 DIAGNOSIS — I89.0 LYMPHEDEMA OF BOTH LOWER EXTREMITIES: ICD-10-CM

## 2022-02-09 DIAGNOSIS — N18.30 STAGE 3 CHRONIC KIDNEY DISEASE, UNSPECIFIED WHETHER STAGE 3A OR 3B CKD (HCC): Primary | ICD-10-CM

## 2022-02-09 DIAGNOSIS — B35.1 ONYCHOMYCOSIS: ICD-10-CM

## 2022-02-09 PROCEDURE — 99213 OFFICE O/P EST LOW 20 MIN: CPT | Performed by: STUDENT IN AN ORGANIZED HEALTH CARE EDUCATION/TRAINING PROGRAM

## 2022-02-09 NOTE — PATIENT INSTRUCTIONS
-Discussed importance of proper pedal hygiene and regular foot checks. .  -Patient to avoid walking barefoot.  -Patient to monitor for acute signs of infection and seek immediate medical attention if any signs of infection or other concerns arise.

## 2022-04-28 ENCOUNTER — HOSPITAL ENCOUNTER (EMERGENCY)
Facility: HOSPITAL | Age: 72
Discharge: HOME | End: 2022-04-28
Attending: EMERGENCY MEDICINE
Payer: MEDICARE

## 2022-04-28 ENCOUNTER — APPOINTMENT (OUTPATIENT)
Dept: GENERAL RADIOLOGY | Facility: HOSPITAL | Age: 72
End: 2022-04-28
Attending: EMERGENCY MEDICINE
Payer: MEDICARE

## 2022-04-28 VITALS
WEIGHT: 270 LBS | DIASTOLIC BLOOD PRESSURE: 91 MMHG | OXYGEN SATURATION: 100 % | HEIGHT: 63 IN | SYSTOLIC BLOOD PRESSURE: 103 MMHG | RESPIRATION RATE: 23 BRPM | TEMPERATURE: 98 F | BODY MASS INDEX: 47.84 KG/M2 | HEART RATE: 67 BPM

## 2022-04-28 DIAGNOSIS — J40 BRONCHITIS: Primary | ICD-10-CM

## 2022-04-28 DIAGNOSIS — L03.115 CELLULITIS OF RIGHT LOWER EXTREMITY: ICD-10-CM

## 2022-04-28 LAB
ALBUMIN SERPL-MCNC: 3.2 G/DL (ref 3.4–5)
ALBUMIN/GLOB SERPL: 0.7 {RATIO} (ref 1–2)
ALP LIVER SERPL-CCNC: 98 U/L
ALT SERPL-CCNC: 26 U/L
ANION GAP SERPL CALC-SCNC: 2 MMOL/L (ref 0–18)
AST SERPL-CCNC: 26 U/L (ref 15–37)
BASOPHILS # BLD AUTO: 0.02 X10(3) UL (ref 0–0.2)
BASOPHILS NFR BLD AUTO: 0.2 %
BILIRUB SERPL-MCNC: 0.5 MG/DL (ref 0.1–2)
BUN BLD-MCNC: 17 MG/DL (ref 7–18)
CALCIUM BLD-MCNC: 9.6 MG/DL (ref 8.5–10.1)
CHLORIDE SERPL-SCNC: 108 MMOL/L (ref 98–112)
CO2 SERPL-SCNC: 28 MMOL/L (ref 21–32)
CREAT BLD-MCNC: 0.84 MG/DL
EOSINOPHIL # BLD AUTO: 0.37 X10(3) UL (ref 0–0.7)
EOSINOPHIL NFR BLD AUTO: 4.2 %
ERYTHROCYTE [DISTWIDTH] IN BLOOD BY AUTOMATED COUNT: 12.3 %
GLOBULIN PLAS-MCNC: 4.7 G/DL (ref 2.8–4.4)
GLUCOSE BLD-MCNC: 101 MG/DL (ref 70–99)
HCT VFR BLD AUTO: 43.6 %
HGB BLD-MCNC: 14.2 G/DL
IMM GRANULOCYTES # BLD AUTO: 0.03 X10(3) UL (ref 0–1)
IMM GRANULOCYTES NFR BLD: 0.3 %
LYMPHOCYTES # BLD AUTO: 1.01 X10(3) UL (ref 1–4)
LYMPHOCYTES NFR BLD AUTO: 11.4 %
MCH RBC QN AUTO: 31.7 PG (ref 26–34)
MCHC RBC AUTO-ENTMCNC: 32.6 G/DL (ref 31–37)
MCV RBC AUTO: 97.3 FL
MONOCYTES # BLD AUTO: 1.04 X10(3) UL (ref 0.1–1)
MONOCYTES NFR BLD AUTO: 11.7 %
NEUTROPHILS # BLD AUTO: 6.4 X10 (3) UL (ref 1.5–7.7)
NEUTROPHILS # BLD AUTO: 6.4 X10(3) UL (ref 1.5–7.7)
NEUTROPHILS NFR BLD AUTO: 72.2 %
OSMOLALITY SERPL CALC.SUM OF ELEC: 288 MOSM/KG (ref 275–295)
PLATELET # BLD AUTO: 267 10(3)UL (ref 150–450)
POTASSIUM SERPL-SCNC: 4 MMOL/L (ref 3.5–5.1)
PROT SERPL-MCNC: 7.9 G/DL (ref 6.4–8.2)
RBC # BLD AUTO: 4.48 X10(6)UL
SARS-COV-2 RNA RESP QL NAA+PROBE: NOT DETECTED
SODIUM SERPL-SCNC: 138 MMOL/L (ref 136–145)
WBC # BLD AUTO: 8.9 X10(3) UL (ref 4–11)

## 2022-04-28 PROCEDURE — 71045 X-RAY EXAM CHEST 1 VIEW: CPT | Performed by: EMERGENCY MEDICINE

## 2022-04-28 PROCEDURE — 85025 COMPLETE CBC W/AUTO DIFF WBC: CPT | Performed by: EMERGENCY MEDICINE

## 2022-04-28 PROCEDURE — 80053 COMPREHEN METABOLIC PANEL: CPT | Performed by: EMERGENCY MEDICINE

## 2022-04-28 PROCEDURE — 99284 EMERGENCY DEPT VISIT MOD MDM: CPT

## 2022-04-28 PROCEDURE — 96365 THER/PROPH/DIAG IV INF INIT: CPT

## 2022-04-28 RX ORDER — CEPHALEXIN 500 MG/1
500 CAPSULE ORAL 3 TIMES DAILY
Qty: 30 CAPSULE | Refills: 0 | Status: SHIPPED | OUTPATIENT
Start: 2022-04-28 | End: 2022-05-08

## 2022-04-28 RX ORDER — ALBUTEROL SULFATE 90 UG/1
2 AEROSOL, METERED RESPIRATORY (INHALATION) EVERY 4 HOURS PRN
Qty: 1 EACH | Refills: 0 | Status: SHIPPED | OUTPATIENT
Start: 2022-04-28 | End: 2022-04-28

## 2022-04-28 RX ORDER — ALBUTEROL SULFATE 90 UG/1
2 AEROSOL, METERED RESPIRATORY (INHALATION) EVERY 4 HOURS PRN
Qty: 1 EACH | Refills: 0 | Status: SHIPPED | OUTPATIENT
Start: 2022-04-28 | End: 2022-05-28

## 2022-04-28 RX ORDER — BENZONATATE 100 MG/1
100 CAPSULE ORAL 3 TIMES DAILY PRN
Qty: 30 CAPSULE | Refills: 0 | Status: SHIPPED | OUTPATIENT
Start: 2022-04-28 | End: 2022-04-28

## 2022-04-28 RX ORDER — ONDANSETRON 2 MG/ML
4 INJECTION INTRAMUSCULAR; INTRAVENOUS ONCE
Status: DISCONTINUED | OUTPATIENT
Start: 2022-04-28 | End: 2022-04-28

## 2022-04-28 RX ORDER — PROMETHAZINE HYDROCHLORIDE 25 MG/1
25 TABLET ORAL EVERY 6 HOURS PRN
Status: DISCONTINUED | OUTPATIENT
Start: 2022-04-28 | End: 2022-04-28

## 2022-04-28 RX ORDER — CEPHALEXIN 500 MG/1
500 CAPSULE ORAL 3 TIMES DAILY
Qty: 30 CAPSULE | Refills: 0 | Status: SHIPPED | OUTPATIENT
Start: 2022-04-28 | End: 2022-04-28

## 2022-04-28 RX ORDER — ACETAMINOPHEN AND CODEINE PHOSPHATE 300; 30 MG/1; MG/1
1 TABLET ORAL ONCE
Status: DISCONTINUED | OUTPATIENT
Start: 2022-04-28 | End: 2022-04-28

## 2022-04-28 RX ORDER — ALBUTEROL SULFATE 90 UG/1
2 AEROSOL, METERED RESPIRATORY (INHALATION) 4 TIMES DAILY
Status: DISCONTINUED | OUTPATIENT
Start: 2022-04-28 | End: 2022-04-28

## 2022-04-28 RX ORDER — BENZONATATE 100 MG/1
100 CAPSULE ORAL 3 TIMES DAILY PRN
Qty: 30 CAPSULE | Refills: 0 | Status: SHIPPED | OUTPATIENT
Start: 2022-04-28 | End: 2022-05-28

## 2022-04-28 RX ORDER — PREDNISONE 20 MG/1
40 TABLET ORAL ONCE
Status: COMPLETED | OUTPATIENT
Start: 2022-04-28 | End: 2022-04-28

## 2022-04-28 NOTE — ED INITIAL ASSESSMENT (HPI)
Pt to the ER via [facility] for a cough that started a week ago. Pt states she \"gets into coughing attacks and is unable to stop coughing. \" Pt states she is taking OTC medications to help with the cough. Pt worried she is sick considering she has bilateral lymphedema. Pt presents to the ER a&ox4. Respirations even and nonlabored. Skin warm warm and dry.

## 2022-06-01 ENCOUNTER — OFFICE VISIT (OUTPATIENT)
Dept: PODIATRY CLINIC | Facility: CLINIC | Age: 72
End: 2022-06-01
Payer: MEDICARE

## 2022-06-01 DIAGNOSIS — N18.30 STAGE 3 CHRONIC KIDNEY DISEASE, UNSPECIFIED WHETHER STAGE 3A OR 3B CKD (HCC): ICD-10-CM

## 2022-06-01 DIAGNOSIS — I89.0 LYMPHEDEMA OF BOTH LOWER EXTREMITIES: ICD-10-CM

## 2022-06-01 DIAGNOSIS — B35.1 ONYCHOMYCOSIS: Primary | ICD-10-CM

## 2022-06-01 PROCEDURE — 99213 OFFICE O/P EST LOW 20 MIN: CPT | Performed by: STUDENT IN AN ORGANIZED HEALTH CARE EDUCATION/TRAINING PROGRAM

## 2022-06-05 NOTE — PATIENT INSTRUCTIONS
-Discussed importance of proper pedal hygiene, regular foot checks.  -Continue using compressive wraps to lower extremities.  -Patient to avoid walking barefoot. Continue ambulating with supportive shoes.  -Patient to monitor for acute signs of infection and seek immediate medical attention if any signs of infection or other concerns arise.

## 2022-06-22 ENCOUNTER — HOSPITAL ENCOUNTER (INPATIENT)
Facility: HOSPITAL | Age: 72
LOS: 5 days | Discharge: NURSING, REHAB OR LONG-TERM CARE | End: 2022-06-27
Attending: EMERGENCY MEDICINE | Admitting: HOSPITALIST
Payer: MEDICARE

## 2022-06-22 DIAGNOSIS — L03.115 CELLULITIS OF RIGHT LOWER EXTREMITY: Primary | ICD-10-CM

## 2022-06-22 DIAGNOSIS — I89.0 LYMPHEDEMA: ICD-10-CM

## 2022-06-22 PROBLEM — L03.90 CELLULITIS: Status: ACTIVE | Noted: 2022-06-22

## 2022-06-22 LAB
ALBUMIN SERPL-MCNC: 3.4 G/DL (ref 3.4–5)
ALBUMIN/GLOB SERPL: 0.8 {RATIO} (ref 1–2)
ALP LIVER SERPL-CCNC: 102 U/L
ALT SERPL-CCNC: 17 U/L
ANION GAP SERPL CALC-SCNC: 6 MMOL/L (ref 0–18)
AST SERPL-CCNC: 21 U/L (ref 15–37)
BASOPHILS # BLD AUTO: 0.01 X10(3) UL (ref 0–0.2)
BASOPHILS NFR BLD AUTO: 0.1 %
BILIRUB SERPL-MCNC: 1.1 MG/DL (ref 0.1–2)
BILIRUB UR QL STRIP.AUTO: NEGATIVE
BUN BLD-MCNC: 19 MG/DL (ref 7–18)
CALCIUM BLD-MCNC: 9.4 MG/DL (ref 8.5–10.1)
CHLORIDE SERPL-SCNC: 106 MMOL/L (ref 98–112)
CLARITY UR REFRACT.AUTO: CLEAR
CO2 SERPL-SCNC: 24 MMOL/L (ref 21–32)
COLOR UR AUTO: YELLOW
CREAT BLD-MCNC: 0.85 MG/DL
EOSINOPHIL # BLD AUTO: 0.2 X10(3) UL (ref 0–0.7)
EOSINOPHIL NFR BLD AUTO: 1.9 %
ERYTHROCYTE [DISTWIDTH] IN BLOOD BY AUTOMATED COUNT: 12.9 %
GLOBULIN PLAS-MCNC: 4.2 G/DL (ref 2.8–4.4)
GLUCOSE BLD-MCNC: 87 MG/DL (ref 70–99)
GLUCOSE UR STRIP.AUTO-MCNC: NEGATIVE MG/DL
HCT VFR BLD AUTO: 43.6 %
HGB BLD-MCNC: 14.6 G/DL
IMM GRANULOCYTES # BLD AUTO: 0.03 X10(3) UL (ref 0–1)
IMM GRANULOCYTES NFR BLD: 0.3 %
KETONES UR STRIP.AUTO-MCNC: NEGATIVE MG/DL
LEUKOCYTE ESTERASE UR QL STRIP.AUTO: NEGATIVE
LYMPHOCYTES # BLD AUTO: 1.08 X10(3) UL (ref 1–4)
LYMPHOCYTES NFR BLD AUTO: 10.5 %
MCH RBC QN AUTO: 32.4 PG (ref 26–34)
MCHC RBC AUTO-ENTMCNC: 33.5 G/DL (ref 31–37)
MCV RBC AUTO: 96.7 FL
MONOCYTES # BLD AUTO: 1.13 X10(3) UL (ref 0.1–1)
MONOCYTES NFR BLD AUTO: 11 %
NEUTROPHILS # BLD AUTO: 7.85 X10 (3) UL (ref 1.5–7.7)
NEUTROPHILS # BLD AUTO: 7.85 X10(3) UL (ref 1.5–7.7)
NEUTROPHILS NFR BLD AUTO: 76.2 %
NITRITE UR QL STRIP.AUTO: NEGATIVE
OSMOLALITY SERPL CALC.SUM OF ELEC: 284 MOSM/KG (ref 275–295)
PH UR STRIP.AUTO: 7 [PH] (ref 5–8)
PLATELET # BLD AUTO: 152 10(3)UL (ref 150–450)
POTASSIUM SERPL-SCNC: 3.9 MMOL/L (ref 3.5–5.1)
PROT SERPL-MCNC: 7.6 G/DL (ref 6.4–8.2)
PROT UR STRIP.AUTO-MCNC: NEGATIVE MG/DL
RBC # BLD AUTO: 4.51 X10(6)UL
SARS-COV-2 RNA RESP QL NAA+PROBE: NOT DETECTED
SODIUM SERPL-SCNC: 136 MMOL/L (ref 136–145)
SP GR UR STRIP.AUTO: 1.02 (ref 1–1.03)
UROBILINOGEN UR STRIP.AUTO-MCNC: 0.2 MG/DL
WBC # BLD AUTO: 10.3 X10(3) UL (ref 4–11)

## 2022-06-22 PROCEDURE — 99223 1ST HOSP IP/OBS HIGH 75: CPT | Performed by: HOSPITALIST

## 2022-06-22 RX ORDER — MELATONIN
3 NIGHTLY PRN
Status: DISCONTINUED | OUTPATIENT
Start: 2022-06-22 | End: 2022-06-27

## 2022-06-22 RX ORDER — CEFAZOLIN SODIUM/WATER 2 G/20 ML
2 SYRINGE (ML) INTRAVENOUS ONCE
Status: COMPLETED | OUTPATIENT
Start: 2022-06-22 | End: 2022-06-22

## 2022-06-22 RX ORDER — BENZONATATE 100 MG/1
100 CAPSULE ORAL 3 TIMES DAILY PRN
Status: DISCONTINUED | OUTPATIENT
Start: 2022-06-22 | End: 2022-06-26 | Stop reason: DRUGHIGH

## 2022-06-22 RX ORDER — LEVOTHYROXINE SODIUM 0.07 MG/1
75 TABLET ORAL
Status: DISCONTINUED | OUTPATIENT
Start: 2022-06-22 | End: 2022-06-27

## 2022-06-22 RX ORDER — TRAMADOL HYDROCHLORIDE 50 MG/1
50 TABLET ORAL EVERY 6 HOURS PRN
Status: DISCONTINUED | OUTPATIENT
Start: 2022-06-22 | End: 2022-06-27

## 2022-06-22 RX ORDER — CEFAZOLIN SODIUM/WATER 2 G/20 ML
2 SYRINGE (ML) INTRAVENOUS EVERY 8 HOURS
Status: DISCONTINUED | OUTPATIENT
Start: 2022-06-23 | End: 2022-06-27

## 2022-06-22 RX ORDER — ACETAMINOPHEN 325 MG/1
650 TABLET ORAL EVERY 4 HOURS PRN
Status: DISCONTINUED | OUTPATIENT
Start: 2022-06-22 | End: 2022-06-27

## 2022-06-22 RX ORDER — HYDROCODONE BITARTRATE AND ACETAMINOPHEN 5; 325 MG/1; MG/1
2 TABLET ORAL EVERY 4 HOURS PRN
Status: DISCONTINUED | OUTPATIENT
Start: 2022-06-22 | End: 2022-06-27

## 2022-06-22 RX ORDER — HYDROCODONE BITARTRATE AND ACETAMINOPHEN 5; 325 MG/1; MG/1
1 TABLET ORAL EVERY 4 HOURS PRN
Status: DISCONTINUED | OUTPATIENT
Start: 2022-06-22 | End: 2022-06-27

## 2022-06-22 RX ORDER — METOCLOPRAMIDE HYDROCHLORIDE 5 MG/ML
10 INJECTION INTRAMUSCULAR; INTRAVENOUS EVERY 8 HOURS PRN
Status: DISCONTINUED | OUTPATIENT
Start: 2022-06-22 | End: 2022-06-27

## 2022-06-22 RX ORDER — ATORVASTATIN CALCIUM 10 MG/1
10 TABLET, FILM COATED ORAL NIGHTLY
Status: DISCONTINUED | OUTPATIENT
Start: 2022-06-22 | End: 2022-06-27

## 2022-06-22 RX ORDER — FUROSEMIDE 10 MG/ML
40 INJECTION INTRAMUSCULAR; INTRAVENOUS ONCE
Status: COMPLETED | OUTPATIENT
Start: 2022-06-22 | End: 2022-06-22

## 2022-06-22 RX ORDER — ALBUTEROL SULFATE 90 UG/1
2 AEROSOL, METERED RESPIRATORY (INHALATION) EVERY 4 HOURS PRN
Status: DISCONTINUED | OUTPATIENT
Start: 2022-06-22 | End: 2022-06-27

## 2022-06-22 RX ORDER — ENOXAPARIN SODIUM 100 MG/ML
40 INJECTION SUBCUTANEOUS DAILY
Status: DISCONTINUED | OUTPATIENT
Start: 2022-06-23 | End: 2022-06-24

## 2022-06-22 RX ORDER — ONDANSETRON 2 MG/ML
4 INJECTION INTRAMUSCULAR; INTRAVENOUS EVERY 6 HOURS PRN
Status: DISCONTINUED | OUTPATIENT
Start: 2022-06-22 | End: 2022-06-27

## 2022-06-22 RX ORDER — METOPROLOL TARTRATE 50 MG/1
50 TABLET, FILM COATED ORAL NIGHTLY
Status: DISCONTINUED | OUTPATIENT
Start: 2022-06-22 | End: 2022-06-27

## 2022-06-22 RX ORDER — PANTOPRAZOLE SODIUM 20 MG/1
20 TABLET, DELAYED RELEASE ORAL
Status: DISCONTINUED | OUTPATIENT
Start: 2022-06-23 | End: 2022-06-27

## 2022-06-22 RX ORDER — METOPROLOL TARTRATE 50 MG/1
100 TABLET, FILM COATED ORAL EVERY MORNING
Status: DISCONTINUED | OUTPATIENT
Start: 2022-06-23 | End: 2022-06-27

## 2022-06-22 RX ORDER — TORSEMIDE 20 MG/1
20 TABLET ORAL DAILY
Status: DISCONTINUED | OUTPATIENT
Start: 2022-06-22 | End: 2022-06-23

## 2022-06-22 NOTE — ED INITIAL ASSESSMENT (HPI)
Pt to ED c/o bilateral leg pain that started last week. Pt has hx of lymphedema and she goes to pt 3 times a week to get her legs wrapped with lon hose. Pt reports that the wraps fell off earlier than usual which caused increase in pain and pt was unable to make it to her appointments d/t swelling and pain. Pt also reports swelling to her feet which is new.

## 2022-06-23 PROCEDURE — 99232 SBSQ HOSP IP/OBS MODERATE 35: CPT | Performed by: HOSPITALIST

## 2022-06-23 RX ORDER — FUROSEMIDE 10 MG/ML
40 INJECTION INTRAMUSCULAR; INTRAVENOUS DAILY
Status: DISCONTINUED | OUTPATIENT
Start: 2022-06-23 | End: 2022-06-24

## 2022-06-23 NOTE — PLAN OF CARE
Pt A & O x4, on RA. /IS. Lovenox. Refuses SCDs, chronic lymphedema to BLE. Right leg greater than left. Cardiac diet. PT/OT to see pt. Pt c/o weakness and \"clicking\" noises in her shoulders when she uses her arms. States has been occurring for the past 3 weeks. Redness noted to lower abdominal fold with yellow foul smelling drainage noted. Pt states that is where her old c section incision was from 80. Will inform hospitalist when they round. Redness noted to BLE. Ancef Q8H. ID to see pt today. Will continue to monitor.

## 2022-06-23 NOTE — PLAN OF CARE
Patient A & O x4. VSS, on RA. Patient c/o moderate pain, tramadol given. Voiding freely via [address]. Patient has chronic lymphedema in BLE. Redness to RLE. Excoriation under stomach skin fold. Redness to LLE. Safety measures in place. Instructed to use call light.

## 2022-06-23 NOTE — CM/SW NOTE
Department  notified of request for gege [facility] referrals started. Assigned CM/SW to follow up with pt/family on further discharge planning.      Cassia Raya  [facility]

## 2022-06-23 NOTE — CM/SW NOTE
06/23/22 1200   CM/SW Referral Data   Referral Source Social Work (self-referral)   Reason for Referral Discharge planning   Informant Patient;EMR;Clinical Staff Member   Patient Info   Patient's Current Mental Status at Time of Assessment Alert;Oriented   Patient lives with Sibling   Patient Status Prior to Admission   Services in place prior to admission Other (comment)  (outpatient PT/lymphedema)   Discharge Needs   Anticipated D/C needs Subacute rehab;Outpatient therapy;Transportation services       Patient is a 71 y/o woman admitted with LE celllulitis and lymphedema. Spoke with Stanislaw Murillo from PT who stated recommendation for [facility]. Met with pt who stated that she lives with her brother. She is not , no children. Pt stated she has been going to outpatient lymphedema and PT at an [facility] location in [facility]. They provide lyft transport at no cost to her. Pt has previous history of [facility] at [facility] and had a negative experience there. Discussed PT recommendation for [facility] and options for facilities with on site lymphedema specialist.  Pt expressed possible interest in [facility] and also discussed [facility]. She does feel strongly that she has a good relationship with her outpatient therapist and may wish to return home in order to continue this. Pt would like to see her progress during her hospital stay before deciding on [facility] vs home with outpt therapy. Referrals to [facility] facilities pending. PASRR completed. / to remain available for support and/or discharge planning.      Yashira Harris LCSW  Discharge Planner  [phone]

## 2022-06-23 NOTE — ED QUICK NOTES
Orders for admission, patient is aware of plan and ready to go upstairs. Any questions, please call ED RN Yani Jeong at extension [phone]. Patient Covid vaccination status: Unvaccinated     COVID Test Ordered in ED: Rapid SARS-CoV-2 by PCR    COVID Suspicion at Admission: Low clinical suspicion for COVID    Running Infusions:  None    Mental Status/LOC at time of transport: AOx4    Other pertinent information: patient uses a walker to ambulate.   CIWA score: N/A   NIH score:  N/A

## 2022-06-24 LAB
ANION GAP SERPL CALC-SCNC: 8 MMOL/L (ref 0–18)
BASOPHILS # BLD AUTO: 0.01 X10(3) UL (ref 0–0.2)
BASOPHILS NFR BLD AUTO: 0.1 %
BUN BLD-MCNC: 17 MG/DL (ref 7–18)
CALCIUM BLD-MCNC: 8.7 MG/DL (ref 8.5–10.1)
CHLORIDE SERPL-SCNC: 102 MMOL/L (ref 98–112)
CO2 SERPL-SCNC: 28 MMOL/L (ref 21–32)
CREAT BLD-MCNC: 0.82 MG/DL
EOSINOPHIL # BLD AUTO: 0.21 X10(3) UL (ref 0–0.7)
EOSINOPHIL NFR BLD AUTO: 2.3 %
ERYTHROCYTE [DISTWIDTH] IN BLOOD BY AUTOMATED COUNT: 12.8 %
GLUCOSE BLD-MCNC: 94 MG/DL (ref 70–99)
HCT VFR BLD AUTO: 36.8 %
HGB BLD-MCNC: 12.4 G/DL
IMM GRANULOCYTES # BLD AUTO: 0.03 X10(3) UL (ref 0–1)
IMM GRANULOCYTES NFR BLD: 0.3 %
LYMPHOCYTES # BLD AUTO: 1.03 X10(3) UL (ref 1–4)
LYMPHOCYTES NFR BLD AUTO: 11.4 %
MCH RBC QN AUTO: 32.1 PG (ref 26–34)
MCHC RBC AUTO-ENTMCNC: 33.7 G/DL (ref 31–37)
MCV RBC AUTO: 95.3 FL
MONOCYTES # BLD AUTO: 1.3 X10(3) UL (ref 0.1–1)
MONOCYTES NFR BLD AUTO: 14.3 %
NEUTROPHILS # BLD AUTO: 6.48 X10 (3) UL (ref 1.5–7.7)
NEUTROPHILS # BLD AUTO: 6.48 X10(3) UL (ref 1.5–7.7)
NEUTROPHILS NFR BLD AUTO: 71.6 %
OSMOLALITY SERPL CALC.SUM OF ELEC: 287 MOSM/KG (ref 275–295)
PLATELET # BLD AUTO: 141 10(3)UL (ref 150–450)
POTASSIUM SERPL-SCNC: 3.2 MMOL/L (ref 3.5–5.1)
POTASSIUM SERPL-SCNC: 3.7 MMOL/L (ref 3.5–5.1)
RBC # BLD AUTO: 3.86 X10(6)UL
SODIUM SERPL-SCNC: 138 MMOL/L (ref 136–145)
WBC # BLD AUTO: 9.1 X10(3) UL (ref 4–11)

## 2022-06-24 PROCEDURE — 99232 SBSQ HOSP IP/OBS MODERATE 35: CPT | Performed by: HOSPITALIST

## 2022-06-24 RX ORDER — ENOXAPARIN SODIUM 100 MG/ML
0.5 INJECTION SUBCUTANEOUS DAILY
Status: DISCONTINUED | OUTPATIENT
Start: 2022-06-24 | End: 2022-06-27

## 2022-06-24 RX ORDER — CEPHALEXIN 500 MG/1
CAPSULE ORAL
Qty: 100 CAPSULE | Refills: 0 | Status: SHIPPED | COMMUNITY
Start: 2022-06-24

## 2022-06-24 RX ORDER — POTASSIUM CHLORIDE 20 MEQ/1
40 TABLET, EXTENDED RELEASE ORAL EVERY 4 HOURS
Status: COMPLETED | OUTPATIENT
Start: 2022-06-24 | End: 2022-06-24

## 2022-06-24 RX ORDER — FUROSEMIDE 10 MG/ML
40 INJECTION INTRAMUSCULAR; INTRAVENOUS
Status: DISCONTINUED | OUTPATIENT
Start: 2022-06-24 | End: 2022-06-27

## 2022-06-24 NOTE — PLAN OF CARE
Patient A & O x4. VSS, on RA. C/o mild pain, controlled with PO medication. Chronic lymphedema to BLE. Redness to BLE. Patient c/o weakness/ pain to BUE. Redness to lower abdominal skin fold, antifungal powder applied. IV abx. Safety measures in place. Instructed to use call light.

## 2022-06-24 NOTE — PLAN OF CARE
Patient alert and oriented x4. VSS, on RA. Frequent non-productive cough present. BLE edema noted with cellulitis/lymphedema, no drainage present to BLE. Patient reports pain to BLE and BUE with movement. Up to chair/commode x1 person assist with walker and gait belt. Tolerating cardiac diet, denies N/V. Pt voiding via purewick intermittently.      Plan: IV antibiotics, therapy recommending [facility] at discharge      1410: Per ID note, pt to transition to PO upon discharge

## 2022-06-24 NOTE — CM/SW NOTE
PT asked to see sw- she states she was told she may need to be here through the weekend so will continue to review her [facility] options and let sw know decision .     Roxana Higuera LCSW  /Discharge Planner  [phone]

## 2022-06-24 NOTE — CM/SW NOTE
SW met with pt to discuss [facility] choice- still undecided.     Ayah Josue LCSW  /Discharge Planner  [phone]

## 2022-06-25 LAB
ANION GAP SERPL CALC-SCNC: 7 MMOL/L (ref 0–18)
BUN BLD-MCNC: 18 MG/DL (ref 7–18)
CALCIUM BLD-MCNC: 8.5 MG/DL (ref 8.5–10.1)
CHLORIDE SERPL-SCNC: 101 MMOL/L (ref 98–112)
CO2 SERPL-SCNC: 29 MMOL/L (ref 21–32)
CREAT BLD-MCNC: 0.79 MG/DL
GLUCOSE BLD-MCNC: 98 MG/DL (ref 70–99)
OSMOLALITY SERPL CALC.SUM OF ELEC: 286 MOSM/KG (ref 275–295)
POTASSIUM SERPL-SCNC: 3.4 MMOL/L (ref 3.5–5.1)
POTASSIUM SERPL-SCNC: 4.1 MMOL/L (ref 3.5–5.1)
SODIUM SERPL-SCNC: 137 MMOL/L (ref 136–145)

## 2022-06-25 PROCEDURE — 99232 SBSQ HOSP IP/OBS MODERATE 35: CPT | Performed by: HOSPITALIST

## 2022-06-25 RX ORDER — POTASSIUM CHLORIDE 20 MEQ/1
40 TABLET, EXTENDED RELEASE ORAL EVERY 4 HOURS
Status: COMPLETED | OUTPATIENT
Start: 2022-06-25 | End: 2022-06-25

## 2022-06-25 NOTE — PLAN OF CARE
Severe pitting edema to BLE and generalized non-pitting edema. Skin dry and warm. Abdominal skin folds pink, fungal powder applied. Generalized weakness. Vitals stable on room air. Tolerating regular diet. Voiding clear yellow urine via Purewick catheter. Fall precautions in place. Plan of care discussed with patient who agrees.

## 2022-06-25 NOTE — PLAN OF CARE
A/O VSS on room air. Chronic lymphedema noted to BLE. Redness to BLE. On IV antibiotics. Redness to lower abdominal folds. Antifungal powder applied. Pt c/o weakness/pain to BUE. Up to chair/commode with one person assist with walker and gait belt. Plan of care reviewed. Bed alarm on. Call light within reach.

## 2022-06-25 NOTE — PROGRESS NOTES
Informed by Dr. Jillian Mejia that patient will need lymphedema wrapping to BLE on discharge. Spoke with Ata Gordilol from social work, both [facility] can accept patient and do the lymphedema wrapping. Spoke with patient, she still is undecided on which facility but will review those two options.

## 2022-06-26 ENCOUNTER — APPOINTMENT (OUTPATIENT)
Dept: GENERAL RADIOLOGY | Facility: HOSPITAL | Age: 72
End: 2022-06-26
Attending: HOSPITALIST
Payer: MEDICARE

## 2022-06-26 LAB
ANION GAP SERPL CALC-SCNC: 6 MMOL/L (ref 0–18)
BUN BLD-MCNC: 19 MG/DL (ref 7–18)
CALCIUM BLD-MCNC: 8.9 MG/DL (ref 8.5–10.1)
CHLORIDE SERPL-SCNC: 102 MMOL/L (ref 98–112)
CO2 SERPL-SCNC: 29 MMOL/L (ref 21–32)
CREAT BLD-MCNC: 0.81 MG/DL
GLUCOSE BLD-MCNC: 92 MG/DL (ref 70–99)
OSMOLALITY SERPL CALC.SUM OF ELEC: 286 MOSM/KG (ref 275–295)
POTASSIUM SERPL-SCNC: 3.7 MMOL/L (ref 3.5–5.1)
SODIUM SERPL-SCNC: 137 MMOL/L (ref 136–145)

## 2022-06-26 PROCEDURE — 99232 SBSQ HOSP IP/OBS MODERATE 35: CPT | Performed by: HOSPITALIST

## 2022-06-26 PROCEDURE — 71045 X-RAY EXAM CHEST 1 VIEW: CPT | Performed by: HOSPITALIST

## 2022-06-26 PROCEDURE — 73030 X-RAY EXAM OF SHOULDER: CPT | Performed by: HOSPITALIST

## 2022-06-26 RX ORDER — BENZONATATE 100 MG/1
100 CAPSULE ORAL 3 TIMES DAILY PRN
Status: DISCONTINUED | OUTPATIENT
Start: 2022-06-26 | End: 2022-06-27

## 2022-06-26 RX ORDER — POTASSIUM CHLORIDE 20 MEQ/1
40 TABLET, EXTENDED RELEASE ORAL ONCE
Status: COMPLETED | OUTPATIENT
Start: 2022-06-26 | End: 2022-06-26

## 2022-06-26 NOTE — PLAN OF CARE
Severe pitting edema to BLE and generalized non-pitting edema. Skin dry and warm. Abdominal skin folds without pinkness or redness, fungal powder applied. Generalized weakness. Vitals stable on room air. Dry cough. IS teaching done and encouraged 10 times/hour while awake. Tolerating regular diet. Voiding clear yellow urine via Purewick catheter. Fall precautions in place. Plan of care discussed with patient who agrees.

## 2022-06-26 NOTE — PLAN OF CARE
Pt c/o pain to all extremities on movement. Offered prn pain med but declined at this time. VSS, afebrile. Both ble with lymphedema and redness rt>lt, PP appreciated with use of doppler, denies n/t. Able to ambulate with x1 assist. Voiding freely, purewick on. Plan is dc to [facility], pt to decide on which facility.

## 2022-06-26 NOTE — CM/SW NOTE
Informed by RN that pt has chosen [place] in [place] for [facility]. Possible DC tomorrow. Facility reserved in [address] and message sent regarding possible DC Monday. Await medical clearance for discharge. / to remain available for support and/or discharge planning.      [facility]  [address], hospitalsW  Discharge Planner  [phone]

## 2022-06-27 VITALS
HEIGHT: 63 IN | DIASTOLIC BLOOD PRESSURE: 79 MMHG | WEIGHT: 254.13 LBS | SYSTOLIC BLOOD PRESSURE: 145 MMHG | BODY MASS INDEX: 45.03 KG/M2 | OXYGEN SATURATION: 96 % | TEMPERATURE: 99 F | HEART RATE: 57 BPM | RESPIRATION RATE: 17 BRPM

## 2022-06-27 LAB
POTASSIUM SERPL-SCNC: 3.8 MMOL/L (ref 3.5–5.1)
SARS-COV-2 RNA RESP QL NAA+PROBE: NOT DETECTED

## 2022-06-27 PROCEDURE — 99239 HOSP IP/OBS DSCHRG MGMT >30: CPT | Performed by: HOSPITALIST

## 2022-06-27 RX ORDER — POTASSIUM CHLORIDE 20 MEQ/1
20 TABLET, EXTENDED RELEASE ORAL ONCE
Status: COMPLETED | OUTPATIENT
Start: 2022-06-27 | End: 2022-06-27

## 2022-06-27 RX ORDER — TRAMADOL HYDROCHLORIDE 50 MG/1
50 TABLET ORAL EVERY 6 HOURS PRN
Qty: 20 TABLET | Refills: 0 | Status: SHIPPED | OUTPATIENT
Start: 2022-06-27

## 2022-06-27 RX ORDER — TORSEMIDE 20 MG/1
40 TABLET ORAL DAILY
Qty: 60 TABLET | Refills: 0 | Status: SHIPPED | OUTPATIENT
Start: 2022-06-27

## 2022-06-27 NOTE — PLAN OF CARE
A&O x4. Severe pitting edema to BLE and generalized non-pitting edema. Skin dry and warm. Abdominal skin folds without pinkness or redness, fungal powder applied. Generalized weakness. VSS, RA. Dry cough. Tolerating regular diet. Voiding clear yellow urine via Purewick catheter. Fall precautions in place. Plan to discharge to [facility] on PO abx today.

## 2022-06-27 NOTE — CM/SW NOTE
Informed by RN that pt can discharge to NH today. Spoke with Mansi Moeller in admissions at [facility] who confirmed pt can be accepted. They will provide lymphedema treatments. They are requesting rapid COVID testing prior to DC. Discussed pt has received 2 doses of J&J vaccine. Copy of card sent via [facility]. [facility] transport scheduled for 2:15pm today. They will send stretcher at [facility] rates. PCS form completed and available for RN to print. Updated pt at bedside who expressed agreement with plan. Pt aware of costs for [facility] transport. Updated RN who will complete COVID testing prior to DC. / to remain available for support and/or discharge planning.      [facility] Rehab  [facility]    Ketan Valencia LCSW  Discharge Planner  [phone]

## 2022-06-28 ENCOUNTER — SNF VISIT (OUTPATIENT)
Dept: INTERNAL MEDICINE CLINIC | Age: 72
End: 2022-06-28

## 2022-06-28 VITALS
WEIGHT: 250.19 LBS | SYSTOLIC BLOOD PRESSURE: 151 MMHG | TEMPERATURE: 98 F | OXYGEN SATURATION: 94 % | HEART RATE: 60 BPM | DIASTOLIC BLOOD PRESSURE: 71 MMHG | RESPIRATION RATE: 18 BRPM | BODY MASS INDEX: 44 KG/M2

## 2022-06-28 DIAGNOSIS — I89.0 LYMPHEDEMA OF BOTH LOWER EXTREMITIES: ICD-10-CM

## 2022-06-28 DIAGNOSIS — R53.81 PHYSICAL DECONDITIONING: ICD-10-CM

## 2022-06-28 DIAGNOSIS — L03.115 CELLULITIS OF RIGHT LEG: Primary | ICD-10-CM

## 2022-06-28 DIAGNOSIS — M62.81 MUSCLE WEAKNESS: ICD-10-CM

## 2022-06-28 DIAGNOSIS — I10 ESSENTIAL HYPERTENSION: ICD-10-CM

## 2022-06-28 DIAGNOSIS — E78.5 HYPERLIPIDEMIA, UNSPECIFIED HYPERLIPIDEMIA TYPE: ICD-10-CM

## 2022-06-28 DIAGNOSIS — E07.9 DISORDER OF THYROID: ICD-10-CM

## 2022-06-28 DIAGNOSIS — R60.0 BILATERAL LEG EDEMA: ICD-10-CM

## 2022-06-28 PROCEDURE — 1111F DSCHRG MED/CURRENT MED MERGE: CPT | Performed by: NURSE PRACTITIONER

## 2022-06-28 PROCEDURE — 99310 SBSQ NF CARE HIGH MDM 45: CPT | Performed by: NURSE PRACTITIONER

## 2022-07-07 ENCOUNTER — SNF VISIT (OUTPATIENT)
Dept: INTERNAL MEDICINE CLINIC | Age: 72
End: 2022-07-07

## 2022-07-07 DIAGNOSIS — M62.81 MUSCLE WEAKNESS: ICD-10-CM

## 2022-07-07 DIAGNOSIS — M25.511 CHRONIC PAIN OF BOTH SHOULDERS: ICD-10-CM

## 2022-07-07 DIAGNOSIS — R53.81 PHYSICAL DECONDITIONING: ICD-10-CM

## 2022-07-07 DIAGNOSIS — R60.0 BILATERAL LEG EDEMA: ICD-10-CM

## 2022-07-07 DIAGNOSIS — I89.0 LYMPHEDEMA OF BOTH LOWER EXTREMITIES: ICD-10-CM

## 2022-07-07 DIAGNOSIS — M25.512 CHRONIC PAIN OF BOTH SHOULDERS: ICD-10-CM

## 2022-07-07 DIAGNOSIS — G89.29 CHRONIC PAIN OF BOTH SHOULDERS: ICD-10-CM

## 2022-07-07 DIAGNOSIS — L03.115 CELLULITIS OF RIGHT LEG: Primary | ICD-10-CM

## 2022-07-07 DIAGNOSIS — H54.7 VISUAL IMPAIRMENT: ICD-10-CM

## 2022-07-08 VITALS
OXYGEN SATURATION: 96 % | HEART RATE: 64 BPM | TEMPERATURE: 97 F | SYSTOLIC BLOOD PRESSURE: 134 MMHG | BODY MASS INDEX: 44 KG/M2 | RESPIRATION RATE: 16 BRPM | DIASTOLIC BLOOD PRESSURE: 49 MMHG | WEIGHT: 246.19 LBS

## 2022-09-21 ENCOUNTER — OFFICE VISIT (OUTPATIENT)
Dept: PODIATRY CLINIC | Facility: CLINIC | Age: 72
End: 2022-09-21

## 2022-09-21 DIAGNOSIS — M79.675 TOE PAIN, BILATERAL: ICD-10-CM

## 2022-09-21 DIAGNOSIS — I89.0 LYMPHEDEMA OF BOTH LOWER EXTREMITIES: ICD-10-CM

## 2022-09-21 DIAGNOSIS — N18.30 STAGE 3 CHRONIC KIDNEY DISEASE, UNSPECIFIED WHETHER STAGE 3A OR 3B CKD (HCC): ICD-10-CM

## 2022-09-21 DIAGNOSIS — B35.1 ONYCHOMYCOSIS: Primary | ICD-10-CM

## 2022-09-21 DIAGNOSIS — M79.674 TOE PAIN, BILATERAL: ICD-10-CM

## 2022-09-21 PROCEDURE — 99213 OFFICE O/P EST LOW 20 MIN: CPT | Performed by: STUDENT IN AN ORGANIZED HEALTH CARE EDUCATION/TRAINING PROGRAM

## 2022-09-21 NOTE — PATIENT INSTRUCTIONS
-Discussed importance of proper pedal hygiene, regular foot checks.  -Continue usingcompressive wraps to lower extremities to help with lymphedema.  -Ambulate with accommodative shoes and inserts.  -Patient to monitor for acute signs of infection and seek immediate medical attention if any signs of infection or other concerns arise.

## 2023-01-04 ENCOUNTER — OFFICE VISIT (OUTPATIENT)
Dept: PODIATRY CLINIC | Facility: CLINIC | Age: 73
End: 2023-01-04
Payer: MEDICARE

## 2023-01-04 DIAGNOSIS — M79.674 TOE PAIN, BILATERAL: ICD-10-CM

## 2023-01-04 DIAGNOSIS — I89.0 LYMPHEDEMA OF BOTH LOWER EXTREMITIES: ICD-10-CM

## 2023-01-04 DIAGNOSIS — B35.1 ONYCHOMYCOSIS: Primary | ICD-10-CM

## 2023-01-04 DIAGNOSIS — N18.30 STAGE 3 CHRONIC KIDNEY DISEASE, UNSPECIFIED WHETHER STAGE 3A OR 3B CKD (HCC): ICD-10-CM

## 2023-01-04 DIAGNOSIS — M79.675 TOE PAIN, BILATERAL: ICD-10-CM

## 2023-01-04 PROCEDURE — 99213 OFFICE O/P EST LOW 20 MIN: CPT | Performed by: STUDENT IN AN ORGANIZED HEALTH CARE EDUCATION/TRAINING PROGRAM

## 2023-01-04 NOTE — PATIENT INSTRUCTIONS
-Continue using compressive wraps to lower extremities and ambulate with orthopedic shoes. Follow-up in 2 months for reevaluation or sooner if other concerns arise.

## 2023-02-09 ENCOUNTER — APPOINTMENT (OUTPATIENT)
Dept: GENERAL RADIOLOGY | Facility: HOSPITAL | Age: 73
End: 2023-02-09
Attending: EMERGENCY MEDICINE
Payer: MEDICARE

## 2023-02-09 ENCOUNTER — HOSPITAL ENCOUNTER (INPATIENT)
Facility: HOSPITAL | Age: 73
LOS: 1 days | Discharge: HOME HEALTH | End: 2023-02-11
Attending: EMERGENCY MEDICINE | Admitting: HOSPITALIST
Payer: MEDICARE

## 2023-02-09 ENCOUNTER — APPOINTMENT (OUTPATIENT)
Dept: CV DIAGNOSTICS | Facility: HOSPITAL | Age: 73
End: 2023-02-09
Attending: HOSPITALIST
Payer: MEDICARE

## 2023-02-09 ENCOUNTER — HOSPITAL ENCOUNTER (INPATIENT)
Facility: HOSPITAL | Age: 73
LOS: 1 days | Discharge: HOME | End: 2023-02-11
Attending: EMERGENCY MEDICINE | Admitting: HOSPITALIST
Payer: MEDICARE

## 2023-02-09 DIAGNOSIS — I89.0 LYMPHEDEMA: Primary | ICD-10-CM

## 2023-02-09 LAB
ALBUMIN SERPL-MCNC: 3 G/DL (ref 3.4–5)
ALBUMIN/GLOB SERPL: 0.8 {RATIO} (ref 1–2)
ALP LIVER SERPL-CCNC: 87 U/L
ALT SERPL-CCNC: 19 U/L
ANION GAP SERPL CALC-SCNC: 1 MMOL/L (ref 0–18)
AST SERPL-CCNC: 24 U/L (ref 15–37)
ATRIAL RATE: 62 BPM
BASOPHILS # BLD AUTO: 0.02 X10(3) UL (ref 0–0.2)
BASOPHILS NFR BLD AUTO: 0.4 %
BILIRUB SERPL-MCNC: 0.7 MG/DL (ref 0.1–2)
BUN BLD-MCNC: 20 MG/DL (ref 7–18)
CALCIUM BLD-MCNC: 9.3 MG/DL (ref 8.5–10.1)
CHLORIDE SERPL-SCNC: 110 MMOL/L (ref 98–112)
CO2 SERPL-SCNC: 28 MMOL/L (ref 21–32)
CREAT BLD-MCNC: 0.9 MG/DL
EOSINOPHIL # BLD AUTO: 0.33 X10(3) UL (ref 0–0.7)
EOSINOPHIL NFR BLD AUTO: 6.7 %
ERYTHROCYTE [DISTWIDTH] IN BLOOD BY AUTOMATED COUNT: 12.7 %
GFR SERPLBLD BASED ON 1.73 SQ M-ARVRAT: 68 ML/MIN/1.73M2 (ref 60–?)
GLOBULIN PLAS-MCNC: 3.6 G/DL (ref 2.8–4.4)
GLUCOSE BLD-MCNC: 121 MG/DL (ref 70–99)
HCT VFR BLD AUTO: 40.6 %
HGB BLD-MCNC: 13.5 G/DL
IMM GRANULOCYTES # BLD AUTO: 0.01 X10(3) UL (ref 0–1)
IMM GRANULOCYTES NFR BLD: 0.2 %
LYMPHOCYTES # BLD AUTO: 0.59 X10(3) UL (ref 1–4)
LYMPHOCYTES NFR BLD AUTO: 12 %
MCH RBC QN AUTO: 32.3 PG (ref 26–34)
MCHC RBC AUTO-ENTMCNC: 33.3 G/DL (ref 31–37)
MCV RBC AUTO: 97.1 FL
MONOCYTES # BLD AUTO: 0.58 X10(3) UL (ref 0.1–1)
MONOCYTES NFR BLD AUTO: 11.8 %
NEUTROPHILS # BLD AUTO: 3.39 X10 (3) UL (ref 1.5–7.7)
NEUTROPHILS # BLD AUTO: 3.39 X10(3) UL (ref 1.5–7.7)
NEUTROPHILS NFR BLD AUTO: 68.9 %
NT-PROBNP SERPL-MCNC: 272 PG/ML (ref ?–125)
OSMOLALITY SERPL CALC.SUM OF ELEC: 292 MOSM/KG (ref 275–295)
P AXIS: 18 DEGREES
P-R INTERVAL: 214 MS
PLATELET # BLD AUTO: 159 10(3)UL (ref 150–450)
POTASSIUM SERPL-SCNC: 4.2 MMOL/L (ref 3.5–5.1)
PROCALCITONIN SERPL-MCNC: <0.05 NG/ML (ref ?–0.16)
PROT SERPL-MCNC: 6.6 G/DL (ref 6.4–8.2)
Q-T INTERVAL: 424 MS
QRS DURATION: 100 MS
QTC CALCULATION (BEZET): 430 MS
R AXIS: -9 DEGREES
RBC # BLD AUTO: 4.18 X10(6)UL
SARS-COV-2 RNA RESP QL NAA+PROBE: NOT DETECTED
SODIUM SERPL-SCNC: 139 MMOL/L (ref 136–145)
T AXIS: 17 DEGREES
TROPONIN I HIGH SENSITIVITY: 11 NG/L
VENTRICULAR RATE: 62 BPM
WBC # BLD AUTO: 4.9 X10(3) UL (ref 4–11)

## 2023-02-09 PROCEDURE — 71045 X-RAY EXAM CHEST 1 VIEW: CPT | Performed by: EMERGENCY MEDICINE

## 2023-02-09 PROCEDURE — 99223 1ST HOSP IP/OBS HIGH 75: CPT | Performed by: HOSPITALIST

## 2023-02-09 PROCEDURE — 93306 TTE W/DOPPLER COMPLETE: CPT | Performed by: HOSPITALIST

## 2023-02-09 RX ORDER — ECHINACEA PURPUREA EXTRACT 125 MG
1 TABLET ORAL AS NEEDED
Status: DISCONTINUED | OUTPATIENT
Start: 2023-02-09 | End: 2023-02-11

## 2023-02-09 RX ORDER — TRAMADOL HYDROCHLORIDE 50 MG/1
50 TABLET ORAL EVERY 6 HOURS PRN
Status: DISCONTINUED | OUTPATIENT
Start: 2023-02-09 | End: 2023-02-11

## 2023-02-09 RX ORDER — KETOROLAC TROMETHAMINE 15 MG/ML
15 INJECTION, SOLUTION INTRAMUSCULAR; INTRAVENOUS ONCE
Status: COMPLETED | OUTPATIENT
Start: 2023-02-09 | End: 2023-02-09

## 2023-02-09 RX ORDER — LEVOTHYROXINE SODIUM 88 UG/1
88 TABLET ORAL
Status: DISCONTINUED | OUTPATIENT
Start: 2023-02-10 | End: 2023-02-11

## 2023-02-09 RX ORDER — FUROSEMIDE 10 MG/ML
40 INJECTION INTRAMUSCULAR; INTRAVENOUS ONCE
Status: COMPLETED | OUTPATIENT
Start: 2023-02-09 | End: 2023-02-09

## 2023-02-09 RX ORDER — METOCLOPRAMIDE HYDROCHLORIDE 5 MG/ML
10 INJECTION INTRAMUSCULAR; INTRAVENOUS EVERY 8 HOURS PRN
Status: DISCONTINUED | OUTPATIENT
Start: 2023-02-09 | End: 2023-02-11

## 2023-02-09 RX ORDER — OMEGA-3 FATTY ACIDS/FISH OIL 300-1000MG
200 CAPSULE ORAL
COMMUNITY

## 2023-02-09 RX ORDER — FUROSEMIDE 10 MG/ML
20 INJECTION INTRAMUSCULAR; INTRAVENOUS
Status: DISCONTINUED | OUTPATIENT
Start: 2023-02-09 | End: 2023-02-10

## 2023-02-09 RX ORDER — MELATONIN
1000 DAILY
Status: DISCONTINUED | OUTPATIENT
Start: 2023-02-10 | End: 2023-02-11

## 2023-02-09 RX ORDER — METOPROLOL TARTRATE 100 MG/1
100 TABLET ORAL EVERY MORNING
Status: DISCONTINUED | OUTPATIENT
Start: 2023-02-10 | End: 2023-02-11

## 2023-02-09 RX ORDER — ACETAMINOPHEN 325 MG/1
650 TABLET ORAL EVERY 4 HOURS PRN
Status: DISCONTINUED | OUTPATIENT
Start: 2023-02-09 | End: 2023-02-11

## 2023-02-09 RX ORDER — ECHINACEA PURPUREA EXTRACT 125 MG
1 TABLET ORAL AS NEEDED
COMMUNITY

## 2023-02-09 RX ORDER — HYDROCODONE BITARTRATE AND ACETAMINOPHEN 5; 325 MG/1; MG/1
2 TABLET ORAL EVERY 4 HOURS PRN
Status: DISCONTINUED | OUTPATIENT
Start: 2023-02-09 | End: 2023-02-11

## 2023-02-09 RX ORDER — METOPROLOL TARTRATE 50 MG/1
50 TABLET, FILM COATED ORAL NIGHTLY
Status: DISCONTINUED | OUTPATIENT
Start: 2023-02-09 | End: 2023-02-11

## 2023-02-09 RX ORDER — ONDANSETRON 2 MG/ML
4 INJECTION INTRAMUSCULAR; INTRAVENOUS EVERY 6 HOURS PRN
Status: DISCONTINUED | OUTPATIENT
Start: 2023-02-09 | End: 2023-02-11

## 2023-02-09 RX ORDER — ATORVASTATIN CALCIUM 10 MG/1
10 TABLET, FILM COATED ORAL NIGHTLY
Status: DISCONTINUED | OUTPATIENT
Start: 2023-02-09 | End: 2023-02-11

## 2023-02-09 RX ORDER — MELATONIN
3 NIGHTLY PRN
Status: DISCONTINUED | OUTPATIENT
Start: 2023-02-09 | End: 2023-02-11

## 2023-02-09 RX ORDER — FUROSEMIDE 10 MG/ML
20 INJECTION INTRAMUSCULAR; INTRAVENOUS ONCE
Status: DISCONTINUED | OUTPATIENT
Start: 2023-02-09 | End: 2023-02-09

## 2023-02-09 RX ORDER — CARBOXYMETHYLCELLULOSE SODIUM 10 MG/ML
1 GEL OPHTHALMIC AS NEEDED
Status: DISCONTINUED | OUTPATIENT
Start: 2023-02-09 | End: 2023-02-11

## 2023-02-09 RX ORDER — ENOXAPARIN SODIUM 100 MG/ML
0.5 INJECTION SUBCUTANEOUS DAILY
Status: DISCONTINUED | OUTPATIENT
Start: 2023-02-09 | End: 2023-02-11

## 2023-02-09 RX ORDER — MELATONIN
2000 DAILY
Status: DISCONTINUED | OUTPATIENT
Start: 2023-02-10 | End: 2023-02-11

## 2023-02-09 RX ORDER — HYDROCODONE BITARTRATE AND ACETAMINOPHEN 5; 325 MG/1; MG/1
1 TABLET ORAL EVERY 4 HOURS PRN
Status: DISCONTINUED | OUTPATIENT
Start: 2023-02-09 | End: 2023-02-11

## 2023-02-09 RX ORDER — PANTOPRAZOLE SODIUM 20 MG/1
20 TABLET, DELAYED RELEASE ORAL
Status: DISCONTINUED | OUTPATIENT
Start: 2023-02-10 | End: 2023-02-11

## 2023-02-09 NOTE — ED QUICK NOTES
Orders for admission, patient is aware of plan and ready to go upstairs. Any questions, please call ED RN Jameson Clay at extension [phone].      Patient Covid vaccination status: Fully vaccinated     COVID Test Ordered in ED: Rapid SARS-CoV-2 by PCR    COVID Suspicion at Admission: Low clinical suspicion for COVID    Running Infusions:  None    Mental Status/LOC at time of transport: x3    Other pertinent information:   CIWA score: N/A   NIH score:  N/A

## 2023-02-10 LAB
ANION GAP SERPL CALC-SCNC: 7 MMOL/L (ref 0–18)
BUN BLD-MCNC: 26 MG/DL (ref 7–18)
CALCIUM BLD-MCNC: 9.1 MG/DL (ref 8.5–10.1)
CHLORIDE SERPL-SCNC: 109 MMOL/L (ref 98–112)
CO2 SERPL-SCNC: 26 MMOL/L (ref 21–32)
CREAT BLD-MCNC: 0.9 MG/DL
GFR SERPLBLD BASED ON 1.73 SQ M-ARVRAT: 68 ML/MIN/1.73M2 (ref 60–?)
GLUCOSE BLD-MCNC: 86 MG/DL (ref 70–99)
OSMOLALITY SERPL CALC.SUM OF ELEC: 298 MOSM/KG (ref 275–295)
POTASSIUM SERPL-SCNC: 3.7 MMOL/L (ref 3.5–5.1)
SODIUM SERPL-SCNC: 142 MMOL/L (ref 136–145)

## 2023-02-10 PROCEDURE — 99232 SBSQ HOSP IP/OBS MODERATE 35: CPT | Performed by: HOSPITALIST

## 2023-02-10 RX ORDER — NYSTATIN 100000 [USP'U]/G
POWDER TOPICAL 2 TIMES DAILY PRN
Status: ON HOLD | COMMUNITY
End: 2023-02-11

## 2023-02-10 RX ORDER — POTASSIUM CHLORIDE 20 MEQ/1
40 TABLET, EXTENDED RELEASE ORAL ONCE
Status: COMPLETED | OUTPATIENT
Start: 2023-02-10 | End: 2023-02-10

## 2023-02-10 RX ORDER — NYSTATIN 100000 U/G
CREAM TOPICAL 2 TIMES DAILY
Status: DISCONTINUED | OUTPATIENT
Start: 2023-02-10 | End: 2023-02-11

## 2023-02-10 RX ORDER — FUROSEMIDE 10 MG/ML
40 INJECTION INTRAMUSCULAR; INTRAVENOUS
Status: DISCONTINUED | OUTPATIENT
Start: 2023-02-10 | End: 2023-02-11

## 2023-02-10 NOTE — CM/SW NOTE
HH referral sent in [facility], as per SW note, pt is denying [facility]. [facility] referrals remain open/pending in [place] in the event that pt changes her mind regarding [facility]. Will need PASRR completed if decides on [facility]. CM/SW to follow up with pt on day of discharge to confirm discharge plan and to obtain [facility] choice when list of accepting facilities populates.     MELANI GuilloryN, RN-BC    [id number]

## 2023-02-10 NOTE — PROGRESS NOTES
Patient is saline locked, on room air, up with assist and a walker, on telemetry running SB/SR, tolerating a cardiac diet, purewick in place due to patient receiving IV Lasix BID, Tramadol and Lidoderm patches given for arthritis pain. PT/OT recommending [facility] but patient would prefer to go home. Possible discharge home tomorrow. 1511: Report called to Maynor Jasmine RN on Ortho/Spine. Wheelchair transport ordered. 1524: Paged Antonia Jacobo, per patients' request, to inquire about ordering Nystatin powder for abdominal folds. Antonia Jacobo to order.

## 2023-02-10 NOTE — PLAN OF CARE
Problem: PAIN - ADULT  Goal: Verbalizes/displays adequate comfort level or patient's stated pain goal  Description: INTERVENTIONS:  - Encourage pt to monitor pain and request assistance  - Assess pain using appropriate pain scale  - Administer analgesics based on type and severity of pain and evaluate response  - Implement non-pharmacological measures as appropriate and evaluate response  - Consider cultural and social influences on pain and pain management  - Manage/alleviate anxiety  - Utilize distraction and/or relaxation techniques  - Monitor for opioid side effects  - Notify MD/LIP if interventions unsuccessful or patient reports new pain  - Anticipate increased pain with activity and pre-medicate as appropriate  Outcome: Progressing     Problem: RISK FOR INFECTION - ADULT  Goal: Absence of fever/infection during anticipated neutropenic period  Description: INTERVENTIONS  - Monitor WBC  - Administer growth factors as ordered  - Implement neutropenic guidelines  Outcome: Progressing     Problem: SAFETY ADULT - FALL  Goal: Free from fall injury  Description: INTERVENTIONS:  - Assess pt frequently for physical needs  - Identify cognitive and physical deficits and behaviors that affect risk of falls.   - [place] fall precautions as indicated by assessment.  - Educate pt/family on patient safety including physical limitations  - Instruct pt to call for assistance with activity based on assessment  - Modify environment to reduce risk of injury  - Provide assistive devices as appropriate  - Consider OT/PT consult to assist with strengthening/mobility  - Encourage toileting schedule  Outcome: Progressing     Problem: DISCHARGE PLANNING  Goal: Discharge to home or other facility with appropriate resources  Description: INTERVENTIONS:  - Identify barriers to discharge w/pt and caregiver  - Include patient/family/discharge partner in discharge planning  - Arrange for needed discharge resources and transportation as appropriate  - Identify discharge learning needs (meds, wound care, etc)  - Arrange for interpreters to assist at discharge as needed  - Consider post-discharge preferences of patient/family/discharge partner  - Complete POLST form as appropriate  - Assess patient's ability to be responsible for managing their own health  - Refer to Case Management Department for coordinating discharge planning if the patient needs post-hospital services based on physician/LIP order or complex needs related to functional status, cognitive ability or social support system  Outcome: Progressing     Problem: Patient/Family Goals  Goal: Patient/Family Long Term Goal  Description: Patient's Long Term Goal: Discharge home    Interventions:  - Pain tolerable  - Swelling improved  - Return to previous ADL's  - See additional Care Plan goals for specific interventions  Outcome: Progressing  Goal: Patient/Family Short Term Goal  Description: Patient's Short Term Goal: Prepare for discharge    Interventions:   - Strict I&O  - IV Lasix  - PT/OT eval  - See additional Care Plan goals for specific interventions  Outcome: Progressing

## 2023-02-10 NOTE — CM/SW NOTE
PT recs [facility]. Case discussed with RN, who reports patient would like to go home, not rehab. Just as a backup, DUNCAN requested [facility] to send out [facility] referrals. Insurance auth not needed, straight Medicare A and B.   DUNCAN to remain available.      Fernanda Graham, [facility]  /Discharge Planner

## 2023-02-10 NOTE — PLAN OF CARE
Assumed care at [address]. Alert and oriented x4. Telemetry monitor reading SR/SB. Pain controlled with Norco. B/l lower extremities red and swollen. Fall precautions maintained per protocol. Call light in reach at bedside.

## 2023-02-10 NOTE — PROGRESS NOTES
Patient transfer from over flow. Transported by bed, received awake, alert and oriented x 4. Patient with BLE lymphedema, right lower leg with some redness. Will continue to give Lasix IV as ordered and possible home tomorrow if swelling improve.

## 2023-02-10 NOTE — CM/SW NOTE
Department  notified of request for gege [facility] referrals started. Assigned CM/SW to follow up with pt/family on further discharge planning.      Paulette Underwood  [facility]

## 2023-02-11 VITALS
BODY MASS INDEX: 49 KG/M2 | HEART RATE: 52 BPM | OXYGEN SATURATION: 99 % | DIASTOLIC BLOOD PRESSURE: 55 MMHG | SYSTOLIC BLOOD PRESSURE: 126 MMHG | RESPIRATION RATE: 14 BRPM | WEIGHT: 276 LBS | TEMPERATURE: 98 F

## 2023-02-11 LAB — POTASSIUM SERPL-SCNC: 3.8 MMOL/L (ref 3.5–5.1)

## 2023-02-11 PROCEDURE — 99239 HOSP IP/OBS DSCHRG MGMT >30: CPT | Performed by: HOSPITALIST

## 2023-02-11 RX ORDER — NYSTATIN 100000 [USP'U]/G
POWDER TOPICAL
Qty: 60 G | Refills: 0 | Status: SHIPPED | OUTPATIENT
Start: 2023-02-11

## 2023-02-11 RX ORDER — METOPROLOL TARTRATE 50 MG/1
50 TABLET, FILM COATED ORAL 2 TIMES DAILY
Refills: 0 | Status: SHIPPED | COMMUNITY
Start: 2023-02-11

## 2023-02-11 NOTE — PROGRESS NOTES
Pt have 5 beats of vtach per monitor tech. Pt awake, denies any CP or SOB, breathing regular, on RA, denies palpitations. /63, HR 55-60's. Will cont to monitor.

## 2023-02-11 NOTE — CM/SW NOTE
Pt discharged earlier this afternoon. SW notified [facility] that pt discharged. AVS sent in [place].

## 2023-02-11 NOTE — PLAN OF CARE
Pt given Tramadol for neck and bilat arm pain, states has chronic arthritic pain. Denies any discomfort to both legs. /70, SB on tele monitoring, breathing regular and non-labored. Voiding freely via purewick, urine output clear, output amt adequate. Continue to monitor leg swelling and redness. Recommendation for dc is [facility] but pt prefers to be discharged home.

## 2023-02-11 NOTE — DISCHARGE INSTRUCTIONS
[address] [facility] [address]  Phone: [phone]  Fax: [phone]    Sometimes managing your health at home requires assistance. The [place] team has recognized your preference to use  [facility]: [phone]. A representative from the home health agency will contact you or your family to schedule your first visit.

## 2023-02-11 NOTE — CM/SW NOTE
SW spoke with pt over the phone regarding discharge planning. PT recommending [facility], however, pt voicing no to [facility]. Awaiting to see if there is a [facility] agency that can provide lymphadema wrapping. Will update pt of accepting facility once available. Per pt - confirms that she will be home w/ her brother at hospital discharge. Susana Keene, MSW, LSW  Discharge Planner  [id number]    Addendum: Confirmed w/ [facility] that they can provide lymphadema wrapping. Reserved agency in [address] [place] [address] and updated the AVS.     [facility]  [address]  Phone: [phone]  Fax: [phone]

## 2023-02-11 NOTE — PLAN OF CARE
Pt alert x 4. Feels stiff today. Legs very lg, states they are improved. Able to ambulate to bathroom. C/O pain to neck, bilateral sides. Lidocaine patches applied. Ok to discharge .

## 2023-03-31 ENCOUNTER — OFFICE VISIT (OUTPATIENT)
Dept: PODIATRY CLINIC | Facility: CLINIC | Age: 73
End: 2023-03-31

## 2023-03-31 DIAGNOSIS — N18.30 STAGE 3 CHRONIC KIDNEY DISEASE, UNSPECIFIED WHETHER STAGE 3A OR 3B CKD (HCC): ICD-10-CM

## 2023-03-31 DIAGNOSIS — M79.675 TOE PAIN, BILATERAL: ICD-10-CM

## 2023-03-31 DIAGNOSIS — I89.0 LYMPHEDEMA OF BOTH LOWER EXTREMITIES: ICD-10-CM

## 2023-03-31 DIAGNOSIS — B35.1 ONYCHOMYCOSIS: Primary | ICD-10-CM

## 2023-03-31 DIAGNOSIS — M79.674 TOE PAIN, BILATERAL: ICD-10-CM

## 2023-03-31 PROCEDURE — 99213 OFFICE O/P EST LOW 20 MIN: CPT | Performed by: STUDENT IN AN ORGANIZED HEALTH CARE EDUCATION/TRAINING PROGRAM

## 2023-03-31 NOTE — PATIENT INSTRUCTIONS
-Ambulate with supportive shoes and avoid walking barefoot. Check feet daily. Follow-up in 2 months for reevaluation or sooner if other concerns arise.

## 2023-05-26 ENCOUNTER — HOSPITAL ENCOUNTER (INPATIENT)
Facility: HOSPITAL | Age: 73
LOS: 5 days | Discharge: HOME HEALTH | End: 2023-05-31
Attending: EMERGENCY MEDICINE | Admitting: INTERNAL MEDICINE
Payer: MEDICARE

## 2023-05-26 ENCOUNTER — APPOINTMENT (OUTPATIENT)
Dept: ULTRASOUND IMAGING | Facility: HOSPITAL | Age: 73
End: 2023-05-26
Attending: EMERGENCY MEDICINE
Payer: MEDICARE

## 2023-05-26 DIAGNOSIS — I89.0 LYMPHEDEMA: Primary | ICD-10-CM

## 2023-05-26 DIAGNOSIS — R26.2 UNABLE TO AMBULATE: ICD-10-CM

## 2023-05-26 DIAGNOSIS — I82.4Y9 ACUTE DEEP VEIN THROMBOSIS (DVT) OF PROXIMAL VEIN OF LOWER EXTREMITY, UNSPECIFIED LATERALITY (HCC): ICD-10-CM

## 2023-05-26 LAB
ALBUMIN SERPL-MCNC: 3 G/DL (ref 3.4–5)
ALBUMIN/GLOB SERPL: 0.8 {RATIO} (ref 1–2)
ALP LIVER SERPL-CCNC: 86 U/L
ALT SERPL-CCNC: 15 U/L
ANION GAP SERPL CALC-SCNC: 4 MMOL/L (ref 0–18)
APTT PPP: 31.5 SECONDS (ref 23.3–35.6)
AST SERPL-CCNC: 20 U/L (ref 15–37)
BASOPHILS # BLD AUTO: 0.02 X10(3) UL (ref 0–0.2)
BASOPHILS NFR BLD AUTO: 0.3 %
BILIRUB SERPL-MCNC: 1 MG/DL (ref 0.1–2)
BILIRUB UR QL STRIP.AUTO: NEGATIVE
BUN BLD-MCNC: 28 MG/DL (ref 7–18)
CALCIUM BLD-MCNC: 9.4 MG/DL (ref 8.5–10.1)
CHLORIDE SERPL-SCNC: 106 MMOL/L (ref 98–112)
CLARITY UR REFRACT.AUTO: CLEAR
CO2 SERPL-SCNC: 28 MMOL/L (ref 21–32)
COLOR UR AUTO: YELLOW
CREAT BLD-MCNC: 1 MG/DL
EOSINOPHIL # BLD AUTO: 0.32 X10(3) UL (ref 0–0.7)
EOSINOPHIL NFR BLD AUTO: 4.7 %
ERYTHROCYTE [DISTWIDTH] IN BLOOD BY AUTOMATED COUNT: 13 %
GFR SERPLBLD BASED ON 1.73 SQ M-ARVRAT: 60 ML/MIN/1.73M2 (ref 60–?)
GLOBULIN PLAS-MCNC: 3.9 G/DL (ref 2.8–4.4)
GLUCOSE BLD-MCNC: 104 MG/DL (ref 70–99)
GLUCOSE UR STRIP.AUTO-MCNC: NEGATIVE MG/DL
HCT VFR BLD AUTO: 40.7 %
HGB BLD-MCNC: 13.6 G/DL
IMM GRANULOCYTES # BLD AUTO: 0.01 X10(3) UL (ref 0–1)
IMM GRANULOCYTES NFR BLD: 0.1 %
INR BLD: 1.21 (ref 0.85–1.16)
KETONES UR STRIP.AUTO-MCNC: NEGATIVE MG/DL
LEUKOCYTE ESTERASE UR QL STRIP.AUTO: NEGATIVE
LYMPHOCYTES # BLD AUTO: 0.61 X10(3) UL (ref 1–4)
LYMPHOCYTES NFR BLD AUTO: 9 %
MCH RBC QN AUTO: 32.1 PG (ref 26–34)
MCHC RBC AUTO-ENTMCNC: 33.4 G/DL (ref 31–37)
MCV RBC AUTO: 96 FL
MONOCYTES # BLD AUTO: 0.76 X10(3) UL (ref 0.1–1)
MONOCYTES NFR BLD AUTO: 11.2 %
NEUTROPHILS # BLD AUTO: 5.04 X10 (3) UL (ref 1.5–7.7)
NEUTROPHILS # BLD AUTO: 5.04 X10(3) UL (ref 1.5–7.7)
NEUTROPHILS NFR BLD AUTO: 74.7 %
NITRITE UR QL STRIP.AUTO: NEGATIVE
OSMOLALITY SERPL CALC.SUM OF ELEC: 292 MOSM/KG (ref 275–295)
PH UR STRIP.AUTO: 5 [PH] (ref 5–8)
PLATELET # BLD AUTO: 203 10(3)UL (ref 150–450)
POTASSIUM SERPL-SCNC: 4.5 MMOL/L (ref 3.5–5.1)
PROCALCITONIN SERPL-MCNC: <0.05 NG/ML (ref ?–0.16)
PROT SERPL-MCNC: 6.9 G/DL (ref 6.4–8.2)
PROT UR STRIP.AUTO-MCNC: NEGATIVE MG/DL
PROTHROMBIN TIME: 15.3 SECONDS (ref 11.6–14.8)
RBC # BLD AUTO: 4.24 X10(6)UL
RBC UR QL AUTO: NEGATIVE
SODIUM SERPL-SCNC: 138 MMOL/L (ref 136–145)
SP GR UR STRIP.AUTO: 1.01 (ref 1–1.03)
UROBILINOGEN UR STRIP.AUTO-MCNC: <2 MG/DL
WBC # BLD AUTO: 6.8 X10(3) UL (ref 4–11)

## 2023-05-26 PROCEDURE — 93970 EXTREMITY STUDY: CPT | Performed by: EMERGENCY MEDICINE

## 2023-05-26 PROCEDURE — 99223 1ST HOSP IP/OBS HIGH 75: CPT | Performed by: INTERNAL MEDICINE

## 2023-05-26 RX ORDER — LEVOTHYROXINE SODIUM 88 UG/1
88 TABLET ORAL
Status: DISCONTINUED | OUTPATIENT
Start: 2023-05-27 | End: 2023-05-31

## 2023-05-26 RX ORDER — ACETAMINOPHEN 500 MG
500 TABLET ORAL EVERY 4 HOURS PRN
Status: DISCONTINUED | OUTPATIENT
Start: 2023-05-26 | End: 2023-05-31

## 2023-05-26 RX ORDER — ENOXAPARIN SODIUM 100 MG/ML
100 INJECTION SUBCUTANEOUS ONCE
Status: DISCONTINUED | OUTPATIENT
Start: 2023-05-26 | End: 2023-05-26

## 2023-05-26 RX ORDER — ENOXAPARIN SODIUM 150 MG/ML
1 INJECTION SUBCUTANEOUS ONCE
Status: COMPLETED | OUTPATIENT
Start: 2023-05-26 | End: 2023-05-26

## 2023-05-26 RX ORDER — TORSEMIDE 20 MG/1
20 TABLET ORAL DAILY
Status: DISCONTINUED | OUTPATIENT
Start: 2023-05-26 | End: 2023-05-26

## 2023-05-26 RX ORDER — METOPROLOL TARTRATE 50 MG/1
50 TABLET, FILM COATED ORAL 2 TIMES DAILY
Status: DISCONTINUED | OUTPATIENT
Start: 2023-05-26 | End: 2023-05-31

## 2023-05-26 RX ORDER — TORSEMIDE 20 MG/1
40 TABLET ORAL ONCE
Status: COMPLETED | OUTPATIENT
Start: 2023-05-26 | End: 2023-05-26

## 2023-05-26 RX ORDER — HYDROMORPHONE HYDROCHLORIDE 1 MG/ML
INJECTION, SOLUTION INTRAMUSCULAR; INTRAVENOUS; SUBCUTANEOUS EVERY 30 MIN PRN
Status: DISCONTINUED | OUTPATIENT
Start: 2023-05-26 | End: 2023-05-31

## 2023-05-26 RX ORDER — HEPARIN SODIUM 5000 [USP'U]/ML
5000 INJECTION, SOLUTION INTRAVENOUS; SUBCUTANEOUS EVERY 8 HOURS SCHEDULED
Status: DISCONTINUED | OUTPATIENT
Start: 2023-05-26 | End: 2023-05-27

## 2023-05-26 RX ORDER — ONDANSETRON 2 MG/ML
4 INJECTION INTRAMUSCULAR; INTRAVENOUS EVERY 6 HOURS PRN
Status: DISCONTINUED | OUTPATIENT
Start: 2023-05-26 | End: 2023-05-31

## 2023-05-26 RX ORDER — POLYETHYLENE GLYCOL 3350 17 G/17G
17 POWDER, FOR SOLUTION ORAL DAILY PRN
Status: DISCONTINUED | OUTPATIENT
Start: 2023-05-26 | End: 2023-05-31

## 2023-05-26 RX ORDER — FUROSEMIDE 10 MG/ML
40 INJECTION INTRAMUSCULAR; INTRAVENOUS
Status: COMPLETED | OUTPATIENT
Start: 2023-05-27 | End: 2023-05-29

## 2023-05-26 RX ORDER — FUROSEMIDE 10 MG/ML
40 INJECTION INTRAMUSCULAR; INTRAVENOUS ONCE
Status: COMPLETED | OUTPATIENT
Start: 2023-05-26 | End: 2023-05-26

## 2023-05-26 RX ORDER — MELATONIN
3 NIGHTLY PRN
Status: DISCONTINUED | OUTPATIENT
Start: 2023-05-26 | End: 2023-05-31

## 2023-05-26 RX ORDER — ATORVASTATIN CALCIUM 10 MG/1
10 TABLET, FILM COATED ORAL NIGHTLY
Status: DISCONTINUED | OUTPATIENT
Start: 2023-05-26 | End: 2023-05-31

## 2023-05-26 RX ORDER — HEPARIN SODIUM 1000 [USP'U]/ML
80 INJECTION, SOLUTION INTRAVENOUS; SUBCUTANEOUS ONCE
Status: DISCONTINUED | OUTPATIENT
Start: 2023-05-26 | End: 2023-05-26

## 2023-05-26 RX ORDER — HEPARIN SODIUM AND DEXTROSE 10000; 5 [USP'U]/100ML; G/100ML
18 INJECTION INTRAVENOUS ONCE
Status: DISCONTINUED | OUTPATIENT
Start: 2023-05-26 | End: 2023-05-26

## 2023-05-26 RX ORDER — HEPARIN SODIUM AND DEXTROSE 10000; 5 [USP'U]/100ML; G/100ML
INJECTION INTRAVENOUS CONTINUOUS
Status: DISCONTINUED | OUTPATIENT
Start: 2023-05-27 | End: 2023-05-27

## 2023-05-26 RX ORDER — ENOXAPARIN SODIUM 300 MG/3ML
1 INJECTION INTRAVENOUS; SUBCUTANEOUS EVERY 12 HOURS SCHEDULED
Status: DISCONTINUED | OUTPATIENT
Start: 2023-05-27 | End: 2023-05-27

## 2023-05-26 RX ORDER — PANTOPRAZOLE SODIUM 20 MG/1
20 TABLET, DELAYED RELEASE ORAL
Status: DISCONTINUED | OUTPATIENT
Start: 2023-05-27 | End: 2023-05-31

## 2023-05-26 RX ORDER — SENNOSIDES 8.6 MG
17.2 TABLET ORAL NIGHTLY PRN
Status: DISCONTINUED | OUTPATIENT
Start: 2023-05-26 | End: 2023-05-31

## 2023-05-26 NOTE — ED INITIAL ASSESSMENT (HPI)
Patient to the ER c/o lower leg swelling. Patient has hx of lymphedema. Patient states she began swelling bad 3 days ago. Redness noted to both legs.  No sores no sweating no fever no cough no n/v/d

## 2023-05-27 PROCEDURE — 99233 SBSQ HOSP IP/OBS HIGH 50: CPT | Performed by: HOSPITALIST

## 2023-05-27 RX ORDER — ENOXAPARIN SODIUM 150 MG/ML
1 INJECTION SUBCUTANEOUS EVERY 12 HOURS SCHEDULED
Status: DISCONTINUED | OUTPATIENT
Start: 2023-05-27 | End: 2023-05-27

## 2023-05-27 RX ORDER — TRAMADOL HYDROCHLORIDE 50 MG/1
50 TABLET ORAL EVERY 6 HOURS PRN
Status: DISCONTINUED | OUTPATIENT
Start: 2023-05-27 | End: 2023-05-31

## 2023-05-27 NOTE — PLAN OF CARE
Patient A&Ox4, RA, up 1 assist and walker. Lymphedema to BLE. Ace wrap applied. Receiving lasix. Purewick in place. Reporting pain BUE with movement r/t arthritis. Receiving tramadol prn for pain. Receiving eliquis for DVT. Saline locked. Plan of care discussed w/ patient. Problem: PAIN - ADULT  Goal: Verbalizes/displays adequate comfort level or patient's stated pain goal  Description: INTERVENTIONS:  - Encourage pt to monitor pain and request assistance  - Assess pain using appropriate pain scale  - Administer analgesics based on type and severity of pain and evaluate response  - Implement non-pharmacological measures as appropriate and evaluate response  - Consider cultural and social influences on pain and pain management  - Manage/alleviate anxiety  - Utilize distraction and/or relaxation techniques  - Monitor for opioid side effects  - Notify MD/LIP if interventions unsuccessful or patient reports new pain  - Anticipate increased pain with activity and pre-medicate as appropriate  Outcome: Progressing     Problem: RISK FOR INFECTION - ADULT  Goal: Absence of fever/infection during anticipated neutropenic period  Description: INTERVENTIONS  - Monitor WBC  - Administer growth factors as ordered  - Implement neutropenic guidelines  Outcome: Progressing     Problem: SAFETY ADULT - FALL  Goal: Free from fall injury  Description: INTERVENTIONS:  - Assess pt frequently for physical needs  - Identify cognitive and physical deficits and behaviors that affect risk of falls.   - [place] fall precautions as indicated by assessment.  - Educate pt/family on patient safety including physical limitations  - Instruct pt to call for assistance with activity based on assessment  - Modify environment to reduce risk of injury  - Provide assistive devices as appropriate  - Consider OT/PT consult to assist with strengthening/mobility  - Encourage toileting schedule  Outcome: Progressing     Problem: DISCHARGE PLANNING  Goal: Discharge to home or other facility with appropriate resources  Description: INTERVENTIONS:  - Identify barriers to discharge w/pt and caregiver  - Include patient/family/discharge partner in discharge planning  - Arrange for needed discharge resources and transportation as appropriate  - Identify discharge learning needs (meds, wound care, etc)  - Arrange for interpreters to assist at discharge as needed  - Consider post-discharge preferences of patient/family/discharge partner  - Complete POLST form as appropriate  - Assess patient's ability to be responsible for managing their own health  - Refer to Case Management Department for coordinating discharge planning if the patient needs post-hospital services based on physician/LIP order or complex needs related to functional status, cognitive ability or social support system  Outcome: Progressing

## 2023-05-27 NOTE — PLAN OF CARE
A/ox4. Tolerating RA. , TELE. Non labored respirations. Pt c/o bilateral shoulder pain due to arthritis. Abdomen soft, non tender. Bowel sound active. Tolerating diet. BLE lymphedema and wraped in ace bandages. Pt receiving lasix IV. Purewick in place and clear, yellow urine present in canister. Lovonox ordered for DVT. Pain being managed per MAR. Left PIV CDI. Safety precautions in place. Pt updated on plan of care. Pt has no current questions or complaints.

## 2023-05-27 NOTE — PHYSICAL THERAPY NOTE
PT order received, chart reviewed. Pt admitted with +DVT, with lovenox started in later PM on 5/26. Will hold PT until 24hrs of anticoagulation. Will f/u for PT at a later date.

## 2023-05-27 NOTE — ED QUICK NOTES
Orders for admission, patient is aware of plan and ready to go upstairs. Any questions, please call ED RN Therese Conteh at extension [phone].      Patient Covid vaccination status: Fully vaccinated     COVID Test Ordered in ED: None    COVID Suspicion at Admission: N/A    Running Infusions:  None    Mental Status/LOC at time of transport: AO x 4    Other pertinent information:   CIWA score: N/A   NIH score:  N/A

## 2023-05-28 LAB
ANION GAP SERPL CALC-SCNC: 5 MMOL/L (ref 0–18)
BASOPHILS # BLD AUTO: 0.03 X10(3) UL (ref 0–0.2)
BASOPHILS NFR BLD AUTO: 0.6 %
BUN BLD-MCNC: 26 MG/DL (ref 7–18)
CALCIUM BLD-MCNC: 8.7 MG/DL (ref 8.5–10.1)
CHLORIDE SERPL-SCNC: 105 MMOL/L (ref 98–112)
CO2 SERPL-SCNC: 28 MMOL/L (ref 21–32)
CREAT BLD-MCNC: 0.95 MG/DL
EOSINOPHIL # BLD AUTO: 0.31 X10(3) UL (ref 0–0.7)
EOSINOPHIL NFR BLD AUTO: 6 %
ERYTHROCYTE [DISTWIDTH] IN BLOOD BY AUTOMATED COUNT: 13.2 %
GFR SERPLBLD BASED ON 1.73 SQ M-ARVRAT: 64 ML/MIN/1.73M2 (ref 60–?)
GLUCOSE BLD-MCNC: 107 MG/DL (ref 70–99)
HCT VFR BLD AUTO: 35.2 %
HGB BLD-MCNC: 12 G/DL
IMM GRANULOCYTES # BLD AUTO: 0.01 X10(3) UL (ref 0–1)
IMM GRANULOCYTES NFR BLD: 0.2 %
LYMPHOCYTES # BLD AUTO: 1.22 X10(3) UL (ref 1–4)
LYMPHOCYTES NFR BLD AUTO: 23.5 %
MAGNESIUM SERPL-MCNC: 1.8 MG/DL (ref 1.6–2.6)
MCH RBC QN AUTO: 32 PG (ref 26–34)
MCHC RBC AUTO-ENTMCNC: 34.1 G/DL (ref 31–37)
MCV RBC AUTO: 93.9 FL
MONOCYTES # BLD AUTO: 0.73 X10(3) UL (ref 0.1–1)
MONOCYTES NFR BLD AUTO: 14.1 %
NEUTROPHILS # BLD AUTO: 2.89 X10 (3) UL (ref 1.5–7.7)
NEUTROPHILS # BLD AUTO: 2.89 X10(3) UL (ref 1.5–7.7)
NEUTROPHILS NFR BLD AUTO: 55.6 %
OSMOLALITY SERPL CALC.SUM OF ELEC: 291 MOSM/KG (ref 275–295)
PLATELET # BLD AUTO: 164 10(3)UL (ref 150–450)
POTASSIUM SERPL-SCNC: 3.5 MMOL/L (ref 3.5–5.1)
POTASSIUM SERPL-SCNC: 4.1 MMOL/L (ref 3.5–5.1)
RBC # BLD AUTO: 3.75 X10(6)UL
SODIUM SERPL-SCNC: 138 MMOL/L (ref 136–145)
WBC # BLD AUTO: 5.2 X10(3) UL (ref 4–11)

## 2023-05-28 PROCEDURE — 99232 SBSQ HOSP IP/OBS MODERATE 35: CPT | Performed by: HOSPITALIST

## 2023-05-28 RX ORDER — MAGNESIUM OXIDE 400 MG/1
400 TABLET ORAL ONCE
Status: COMPLETED | OUTPATIENT
Start: 2023-05-28 | End: 2023-05-28

## 2023-05-28 RX ORDER — POTASSIUM CHLORIDE 20 MEQ/1
40 TABLET, EXTENDED RELEASE ORAL EVERY 4 HOURS
Status: COMPLETED | OUTPATIENT
Start: 2023-05-28 | End: 2023-05-28

## 2023-05-28 NOTE — PROGRESS NOTES
Patient is alert and oriented. On room air. Tele with NSR. Potassium and magnesium replaced per protcol. BLE swelling with ACE bandages wrapped around them. Chris Oh in place with high amount of urine. On IV lasix. Tolerating diet. Saline locked. Redness noted to left breast and right armpit. Powder applied. POC updated with patient. Patient verbalized understanding.

## 2023-05-28 NOTE — PLAN OF CARE
Problem: PAIN - ADULT  Goal: Verbalizes/displays adequate comfort level or patient's stated pain goal  Description: INTERVENTIONS:  - Encourage pt to monitor pain and request assistance  - Assess pain using appropriate pain scale  - Administer analgesics based on type and severity of pain and evaluate response  - Implement non-pharmacological measures as appropriate and evaluate response  - Consider cultural and social influences on pain and pain management  - Manage/alleviate anxiety  - Utilize distraction and/or relaxation techniques  - Monitor for opioid side effects  - Notify MD/LIP if interventions unsuccessful or patient reports new pain  - Anticipate increased pain with activity and pre-medicate as appropriate  Outcome: Progressing     Problem: RISK FOR INFECTION - ADULT  Goal: Absence of fever/infection during anticipated neutropenic period  Description: INTERVENTIONS  - Monitor WBC  - Administer growth factors as ordered  - Implement neutropenic guidelines  Outcome: Progressing     Problem: SAFETY ADULT - FALL  Goal: Free from fall injury  Description: INTERVENTIONS:  - Assess pt frequently for physical needs  - Identify cognitive and physical deficits and behaviors that affect risk of falls.   - [place] fall precautions as indicated by assessment.  - Educate pt/family on patient safety including physical limitations  - Instruct pt to call for assistance with activity based on assessment  - Modify environment to reduce risk of injury  - Provide assistive devices as appropriate  - Consider OT/PT consult to assist with strengthening/mobility  - Encourage toileting schedule  Outcome: Progressing     Problem: DISCHARGE PLANNING  Goal: Discharge to home or other facility with appropriate resources  Description: INTERVENTIONS:  - Identify barriers to discharge w/pt and caregiver  - Include patient/family/discharge partner in discharge planning  - Arrange for needed discharge resources and transportation as appropriate  - Identify discharge learning needs (meds, wound care, etc)  - Arrange for interpreters to assist at discharge as needed  - Consider post-discharge preferences of patient/family/discharge partner  - Complete POLST form as appropriate  - Assess patient's ability to be responsible for managing their own health  - Refer to Case Management Department for coordinating discharge planning if the patient needs post-hospital services based on physician/LIP order or complex needs related to functional status, cognitive ability or social support system  Outcome: Progressing

## 2023-05-28 NOTE — PLAN OF CARE
Alert, orient. Room air. Pain in zay upper shoulders r/t arthritis: tramadol effective. ZAY LE with lymphedema, wrapped in ace wraps. Purewick in place. Reviewed plan of care with patient.

## 2023-05-28 NOTE — PHYSICAL THERAPY NOTE
Orders received and chart reviewed. RN ok with session. PT attempted to work with the patient, however patient getting labwork done at bedside. Will attempt at a later time pending triage scheduling and patient appropriateness. RN updated.

## 2023-05-29 LAB
ANION GAP SERPL CALC-SCNC: 4 MMOL/L (ref 0–18)
BUN BLD-MCNC: 27 MG/DL (ref 7–18)
CALCIUM BLD-MCNC: 8.6 MG/DL (ref 8.5–10.1)
CHLORIDE SERPL-SCNC: 106 MMOL/L (ref 98–112)
CO2 SERPL-SCNC: 27 MMOL/L (ref 21–32)
CREAT BLD-MCNC: 1.11 MG/DL
GFR SERPLBLD BASED ON 1.73 SQ M-ARVRAT: 53 ML/MIN/1.73M2 (ref 60–?)
GLUCOSE BLD-MCNC: 93 MG/DL (ref 70–99)
MAGNESIUM SERPL-MCNC: 1.7 MG/DL (ref 1.6–2.6)
OSMOLALITY SERPL CALC.SUM OF ELEC: 289 MOSM/KG (ref 275–295)
POTASSIUM SERPL-SCNC: 4.4 MMOL/L (ref 3.5–5.1)
SODIUM SERPL-SCNC: 137 MMOL/L (ref 136–145)

## 2023-05-29 PROCEDURE — 99232 SBSQ HOSP IP/OBS MODERATE 35: CPT | Performed by: HOSPITALIST

## 2023-05-29 RX ORDER — MAGNESIUM OXIDE 400 MG/1
400 TABLET ORAL ONCE
Status: DISCONTINUED | OUTPATIENT
Start: 2023-05-29 | End: 2023-05-31

## 2023-05-29 NOTE — PLAN OF CARE
Problem: PAIN - ADULT  Goal: Verbalizes/displays adequate comfort level or patient's stated pain goal  Description: INTERVENTIONS:  - Encourage pt to monitor pain and request assistance  - Assess pain using appropriate pain scale  - Administer analgesics based on type and severity of pain and evaluate response  - Implement non-pharmacological measures as appropriate and evaluate response  - Consider cultural and social influences on pain and pain management  - Manage/alleviate anxiety  - Utilize distraction and/or relaxation techniques  - Monitor for opioid side effects  - Notify MD/LIP if interventions unsuccessful or patient reports new pain  - Anticipate increased pain with activity and pre-medicate as appropriate  Outcome: Progressing     Problem: RISK FOR INFECTION - ADULT  Goal: Absence of fever/infection during anticipated neutropenic period  Description: INTERVENTIONS  - Monitor WBC  - Administer growth factors as ordered  - Implement neutropenic guidelines  Outcome: Progressing     Problem: SAFETY ADULT - FALL  Goal: Free from fall injury  Description: INTERVENTIONS:  - Assess pt frequently for physical needs  - Identify cognitive and physical deficits and behaviors that affect risk of falls.   - [place] fall precautions as indicated by assessment.  - Educate pt/family on patient safety including physical limitations  - Instruct pt to call for assistance with activity based on assessment  - Modify environment to reduce risk of injury  - Provide assistive devices as appropriate  - Consider OT/PT consult to assist with strengthening/mobility  - Encourage toileting schedule  Outcome: Progressing     Problem: DISCHARGE PLANNING  Goal: Discharge to home or other facility with appropriate resources  Description: INTERVENTIONS:  - Identify barriers to discharge w/pt and caregiver  - Include patient/family/discharge partner in discharge planning  - Arrange for needed discharge resources and transportation as appropriate  - Identify discharge learning needs (meds, wound care, etc)  - Arrange for interpreters to assist at discharge as needed  - Consider post-discharge preferences of patient/family/discharge partner  - Complete POLST form as appropriate  - Assess patient's ability to be responsible for managing their own health  - Refer to Case Management Department for coordinating discharge planning if the patient needs post-hospital services based on physician/LIP order or complex needs related to functional status, cognitive ability or social support system  Outcome: Progressing

## 2023-05-29 NOTE — PLAN OF CARE
Alert, [place]. Glasses. Room air. No cough. Cardiac Diet. Min assist with walker to get out of bed. Shuffling gait r/t to lymphedema and ace wraps doc LE. X 1 incont urine- purewick in place. Reviewed plan of care with patient; Pt to evaluate tomorrow.

## 2023-05-30 LAB
ANION GAP SERPL CALC-SCNC: 3 MMOL/L (ref 0–18)
BUN BLD-MCNC: 33 MG/DL (ref 7–18)
CALCIUM BLD-MCNC: 8.8 MG/DL (ref 8.5–10.1)
CHLORIDE SERPL-SCNC: 101 MMOL/L (ref 98–112)
CO2 SERPL-SCNC: 30 MMOL/L (ref 21–32)
CREAT BLD-MCNC: 1.09 MG/DL
GFR SERPLBLD BASED ON 1.73 SQ M-ARVRAT: 54 ML/MIN/1.73M2 (ref 60–?)
GLUCOSE BLD-MCNC: 93 MG/DL (ref 70–99)
MAGNESIUM SERPL-MCNC: 1.7 MG/DL (ref 1.6–2.6)
OSMOLALITY SERPL CALC.SUM OF ELEC: 285 MOSM/KG (ref 275–295)
POTASSIUM SERPL-SCNC: 3.7 MMOL/L (ref 3.5–5.1)
SODIUM SERPL-SCNC: 134 MMOL/L (ref 136–145)

## 2023-05-30 PROCEDURE — 99232 SBSQ HOSP IP/OBS MODERATE 35: CPT | Performed by: HOSPITALIST

## 2023-05-30 RX ORDER — POTASSIUM CHLORIDE 20 MEQ/1
40 TABLET, EXTENDED RELEASE ORAL ONCE
Status: COMPLETED | OUTPATIENT
Start: 2023-05-30 | End: 2023-05-30

## 2023-05-30 NOTE — PROGRESS NOTES
A/O x 4, Pt  Very emotional about being repositioned. Very afraid of falling. Cannot tolerate legs being touched. Purewick changed. 650+cc ron urine noted in canister and pt had some urine in her brief, tramadol given for pain x 1. SR on tele. Pt on R. A.

## 2023-05-30 NOTE — DISCHARGE INSTRUCTIONS
-call for fever >101 F or severe pain or increased leg swelling  -diet as tolerated  -activity as tolerated      Sometimes managing your health at home requires assistance. The [facility] team has recognized your preference to use [facility]. They can be reached by phone at [phone]. The fax number for your reference is [phone]. . A representative from the home health agency will contact you or your family to schedule your first visit.       Wear tubigrip on lower legs   Elevate legs while in bed or sitting  Walk with walker    *FOR ANY QUESTIONS Yeny Pedroza M.D.*

## 2023-05-30 NOTE — CM/SW NOTE
05/30/23 1200   CM/SW Referral Data   Referral Source Physician   Reason for Referral Discharge planning   Informant Patient   Patient Info   Patient's Home Environment Condo/Apt no elevator   Number of Levels in Home 1   Patient lives with Sibling  (Brother)   Patient Status Prior to Admission   Independent with ADLs and Mobility Yes   Services in place prior to admission [address] [facility];DME/Supplies at home   [address] Provider Info [facility]   Type of DME/Supplies [address]   Discharge Needs   Anticipated D/C needs Home health care   Choice of Post-Acute Provider   Informed patient of right to choose their preferred provider Yes   List of appropriate post-acute services provided to patient/family with quality data No - Declined list   Patient/family choice [facility]     Met with patient at the bedside to discuss discharge planning. Pt lives w/her brother, independent but does not drive as she has no car. She uses Car service called [address] for appointments. Pt reports she is current with [facility]  for her lymphedema and therapy and reportrs new episode just started this week. CM requested department  [facility]) to initiate [address] referral for [facility]. SW/CM will continue to follow. / to remain available for support and/or discharge planning.      Lashay HERRERAA MSN, RN CTL/  [id number]

## 2023-05-30 NOTE — PROGRESS NOTES
Vss. Pt resting in bedside chair this am. Pt on ra with 02 sats wnl. Lungs cta. Pt denies difficulty breathing or chest pain. Denies pain. Bilateral lower extremities pink and edematous, +2 edema noted. Pt voiding with good output, purwick removed this am and mesh underwear in place. Pt ok with  trying to use bathroom to void if able. Iv sl, site intact. Libby, from wound care on floor and will be seeing patient today. OT in with patient this am and PT seeing patient at this time. Pt updated on poc regarding possible discharge depending on above and states she would feel better going home tomorrow to work on her mobility and her brother will be home tomorrow to help her as well. Dr. Chaparrita Tomas updated. Will monitor.

## 2023-05-30 NOTE — CM/SW NOTE
.Department  notified of request for [facility], aidin referrals started. Assigned CM/SW to follow up with pt/family on further discharge planning.      Maryjane Ennis  [facility]

## 2023-05-31 VITALS
TEMPERATURE: 98 F | RESPIRATION RATE: 18 BRPM | BODY MASS INDEX: 43 KG/M2 | DIASTOLIC BLOOD PRESSURE: 71 MMHG | OXYGEN SATURATION: 98 % | WEIGHT: 243.63 LBS | HEART RATE: 67 BPM | SYSTOLIC BLOOD PRESSURE: 146 MMHG

## 2023-05-31 LAB
ANION GAP SERPL CALC-SCNC: 1 MMOL/L (ref 0–18)
BUN BLD-MCNC: 27 MG/DL (ref 7–18)
CALCIUM BLD-MCNC: 8.7 MG/DL (ref 8.5–10.1)
CHLORIDE SERPL-SCNC: 103 MMOL/L (ref 98–112)
CO2 SERPL-SCNC: 31 MMOL/L (ref 21–32)
CREAT BLD-MCNC: 0.92 MG/DL
GFR SERPLBLD BASED ON 1.73 SQ M-ARVRAT: 66 ML/MIN/1.73M2 (ref 60–?)
GLUCOSE BLD-MCNC: 91 MG/DL (ref 70–99)
MAGNESIUM SERPL-MCNC: 1.8 MG/DL (ref 1.6–2.6)
OSMOLALITY SERPL CALC.SUM OF ELEC: 285 MOSM/KG (ref 275–295)
POTASSIUM SERPL-SCNC: 3.5 MMOL/L (ref 3.5–5.1)
POTASSIUM SERPL-SCNC: 3.5 MMOL/L (ref 3.5–5.1)
SODIUM SERPL-SCNC: 135 MMOL/L (ref 136–145)

## 2023-05-31 PROCEDURE — 99239 HOSP IP/OBS DSCHRG MGMT >30: CPT | Performed by: INTERNAL MEDICINE

## 2023-05-31 NOTE — PROGRESS NOTES
NURSING DISCHARGE NOTE    Discharged Home via Wheelchair. Accompanied by Support staff  Belongings Taken by patient/family     Verbal and written discharge instructions given to patient . Verbalized understanding With tolerable pain. To home in stable condition. Leandro Mei

## 2023-05-31 NOTE — PLAN OF CARE
A&Ox3-4. Momentarily disoriented upon waking overnight, delirium assessment performed and negative. VSS. RA. . Denies chest pain and SOB. Telemetry: NSR. GI: Abdomen soft, nondistended. Passing gas. Denies nausea. : Voids. Pain controlled with PRN pain medications. Up with standby assist and walker. Drains: none   Incisions: none  Bilateral lower extremity tubigrips in place. Diet: Cardiac diet  S/L. All appropriate safety measures in place. All questions and concerns addressed.

## 2023-05-31 NOTE — CM/SW NOTE
Call placed to pt's preferred [address] [facility] to price check Eliquis. Spoke to [facility] who confirmed 90 day supply will cost pt $765/90 day supply. Informed pt of cost and this is not affordable for her. Pt has Medicare Part D. Message sent to hospitalist to provide update and to determine if Xarelto would be appropriate for DVT treatment/prevention - requested script be electronically sent to the [address] [place] [address] to be price checked/have coupons applied. Await response. Pt should be eligible for the 30 day free savings card, however likely will not be eligible for the savings program due to having Medicare. 1100: Spoke with Compa Cohen in the [address] [place] [address]. She confirmed that pt should be eligible for the one time free Eliquis starter pack. CM met with pt to discuss and to obtain prescription insurance information. Pt unable to provide insurance info. Compa Cohen received information from [employer] and requested script be transferred to the [address] [place] [address] for coupons to be applied. Will update pt. Notified [facility] that pt will dc home today. Will send AVS when available. Pt states that she will be taking a taxi home from the hospital. Offered to arrange [facility] [place], however pt declines due to cost. She confirms she is able to safely get into a vehicle. Updated unit SW.     Fernando Martin, MELANIN, RN-BC    [id number]

## 2023-05-31 NOTE — PROGRESS NOTES
Patient seen and examined  Medically stable and okay for discharge home today  Further documentation to follow

## 2023-08-23 ENCOUNTER — OFFICE VISIT (OUTPATIENT)
Dept: PODIATRY CLINIC | Facility: CLINIC | Age: 73
End: 2023-08-23

## 2023-08-23 DIAGNOSIS — N18.30 STAGE 3 CHRONIC KIDNEY DISEASE, UNSPECIFIED WHETHER STAGE 3A OR 3B CKD (HCC): ICD-10-CM

## 2023-08-23 DIAGNOSIS — M79.674 TOE PAIN, BILATERAL: ICD-10-CM

## 2023-08-23 DIAGNOSIS — B35.1 ONYCHOMYCOSIS: Primary | ICD-10-CM

## 2023-08-23 DIAGNOSIS — I89.0 LYMPHEDEMA OF BOTH LOWER EXTREMITIES: ICD-10-CM

## 2023-08-23 DIAGNOSIS — M79.675 TOE PAIN, BILATERAL: ICD-10-CM

## 2023-08-23 PROCEDURE — 99213 OFFICE O/P EST LOW 20 MIN: CPT | Performed by: STUDENT IN AN ORGANIZED HEALTH CARE EDUCATION/TRAINING PROGRAM

## 2023-08-23 NOTE — PATIENT INSTRUCTIONS
Ambulate with supportive shoes and avoid walking barefoot. Continue using compression wraps. Follow-up in 2 months or sooner if other concerns arise.

## 2023-08-23 NOTE — PROGRESS NOTES
[facility] Podiatry  Progress Note    Anand Campos is a 67year old female. Patient presents with:  Toenail Care: And foot care F/u 67 yr old female unable to trim her toenail due medical condition. HPI:     Patient is a pleasant 75-year-old female with past medical history of lymphedema and CKD who returns to clinic for evaluation of elongated and thickened nails she has difficulty trimming on her own. They are very long today she had to reschedule last appointment. Patient does take blood thinners. Patient has significant lymphedema to her lower extremities and has history of recurrent cellulitis as a result. She uses compressive wraps which is managed by her primary doctor and home therapist.  She presents to clinic with wraps intact. She denies any open sores to her lower extremities at this time. No other complaints are mentioned. Allergies: Amoxicillin, Erythromycin, Penicillins, Tetracycline, Alc-Benzyl Alc-Sulfamethoxazole-Trimethoprim, Sulfa Antibiotics, Tetracyclines & Related, and Opioid Analgesics   Current Outpatient Medications   Medication Sig Dispense Refill    apixaban 5 MG Oral Tab Take 2 tabs (10mg) by mouth twice daily until the evening of 6/3, then beginning the evening of 6/3 take 1 tab (5mg) by mouth twice daily thereafter. 74 tablet 0    Nystatin 770118 UNIT/GM External Powder To affected area 60 g 0    metoprolol tartrate 50 MG Oral Tab Take 1 tablet (50 mg total) by mouth 2 (two) times daily. Takes 50 mg at night and 100mg in AM  0    lidocaine-menthol 4-1 % External Patch Place 2 patches onto the skin daily. 30 patch 0    carbamide peroxide (EAR DROPS EARWAX AID) 6.5 % Otic Solution Place 5 drops into both ears 2 (two) times daily. 15 mL 0    sodium chloride 0.65 % Nasal Solution 1 spray by Nasal route as needed for congestion. torsemide 20 MG Oral Tab Take 2 tablets (40 mg total) by mouth daily.  60 tablet 0    traMADol 50 MG Oral Tab Take 1 tablet (50 mg total) by mouth every 6 (six) hours as needed. 20 tablet 0    Carboxymethylcellul-Glycerin (CLEAR EYES FOR DRY EYES) 1-0.25 % Ophthalmic Solution Place 1 drop into both eyes daily as needed. levothyroxine 88 MCG Oral Tab Take 1 tablet (88 mcg total) by mouth before breakfast.      Cholecalciferol (VITAMIN D) 50 MCG (2000 UT) Oral Cap Take 1 capsule (2,000 Units total) by mouth daily. Vitamin B-12 1000 MCG Oral Tab Take 1 tablet (1,000 mcg total) by mouth daily. atorvastatin 10 MG Oral Tab Take 1 tablet (10 mg total) by mouth nightly. omeprazole 20 MG Oral Capsule Delayed Release Take 1 capsule (20 mg total) by mouth every morning.         Past Medical History:   Diagnosis Date    Cataract     right eye completed; future plan for left eye procceure    Disorder of thyroid     uses synthroid    Essential hypertension     High blood pressure     High cholesterol     Hx of pancreatitis     6 times    Hyperlipidemia     Lymphedema of both lower extremities     longstanding, unknown origin    Muscle weakness     Visual impairment       Past Surgical History:   Procedure Laterality Date    CATARACT      CHOLECYSTECTOMY        Family History   Problem Relation Age of Onset    Hypertension Father     Heart Disorder Paternal Grandfather     Psychiatric Sister     Lipids Brother     Obesity Sister       Social History    Socioeconomic History      Marital status: Single    Tobacco Use      Smoking status: Never      Smokeless tobacco: Never    Vaping Use      Vaping Use: Never used    Substance and Sexual Activity      Alcohol use: Not Currently      Drug use: Not Currently          REVIEW OF SYSTEMS:     Today reviewed systems as documented below  GENERAL HEALTH: feels well otherwise  SKIN: denies any unusual skin lesions or rashes  RESPIRATORY: denies shortness of breath with exertion  CARDIOVASCULAR: denies chest pain on exertion  GI: denies abdominal pain and denies heartburn  NEURO: denies headaches  MUSCULO: Acknowledges arthritis      EXAM:   There were no vitals taken for this visit. GENERAL: well developed, well nourished, in no apparent distress  EXTREMITIES:  1. Integument: Normal skin temperature and turgor. Nails are elongated, thickened, dystrophic, with subungual debris x10. Compressive wraps intact to lower extremities for management of lymphedema. 2. Vascular: Unable to palpate pedal pulses due to significant lymphedema to lower extremities. 3. Musculoskeletal: All muscle groups are graded 5 out of 5 in the foot and ankle. Flexion contracture present to digits bilaterally. 4. Neurological: Normal sharp dull sensation; reflexes normal.  Sensation intact to digits via light touch. ASSESSMENT AND PLAN:   Diagnoses and all orders for this visit:    Onychomycosis    Lymphedema of both lower extremities    Stage 3 chronic kidney disease, unspecified whether stage 3a or 3b CKD (HCC)    Toe pain, bilateral          Plan:   -Patient examined, chart history reviewed. -Discussed importance of proper pedal hygiene and regular foot checks.  -Sharply debrided nails x10 with a sterile nail nipper achieving a 20% reduction in thickness and length, without incident. Nails further smoothed with dremel.   -Patient should continue following with primary doctor and therapist for management of lymphedema.    -Educated patient on acute signs of infection advised patient to seek immediate medical attention if symptoms arise. The patient indicates understanding of these issues and agrees to the plan. Return in about 2 months (around 10/23/2023) for routine foot care.     Vanessa Talamantes DPM

## 2023-09-19 ENCOUNTER — HOSPITAL ENCOUNTER (EMERGENCY)
Facility: HOSPITAL | Age: 73
Discharge: HOME | End: 2023-09-19
Attending: EMERGENCY MEDICINE
Payer: MEDICARE

## 2023-09-19 ENCOUNTER — APPOINTMENT (OUTPATIENT)
Dept: ULTRASOUND IMAGING | Facility: HOSPITAL | Age: 73
End: 2023-09-19
Attending: EMERGENCY MEDICINE
Payer: MEDICARE

## 2023-09-19 VITALS
BODY MASS INDEX: 44.3 KG/M2 | HEART RATE: 64 BPM | RESPIRATION RATE: 18 BRPM | OXYGEN SATURATION: 100 % | DIASTOLIC BLOOD PRESSURE: 90 MMHG | SYSTOLIC BLOOD PRESSURE: 171 MMHG | TEMPERATURE: 98 F | WEIGHT: 250 LBS | HEIGHT: 63 IN

## 2023-09-19 DIAGNOSIS — I89.0 LYMPH EDEMA: Primary | ICD-10-CM

## 2023-09-19 LAB
ALBUMIN SERPL-MCNC: 3.5 G/DL (ref 3.4–5)
ALBUMIN/GLOB SERPL: 0.8 {RATIO} (ref 1–2)
ALP LIVER SERPL-CCNC: 96 U/L
ALT SERPL-CCNC: 19 U/L
ANION GAP SERPL CALC-SCNC: 7 MMOL/L (ref 0–18)
AST SERPL-CCNC: 30 U/L (ref 15–37)
BASOPHILS # BLD AUTO: 0.01 X10(3) UL (ref 0–0.2)
BASOPHILS NFR BLD AUTO: 0.1 %
BILIRUB SERPL-MCNC: 1 MG/DL (ref 0.1–2)
BUN BLD-MCNC: 23 MG/DL (ref 7–18)
CALCIUM BLD-MCNC: 9.9 MG/DL (ref 8.5–10.1)
CHLORIDE SERPL-SCNC: 106 MMOL/L (ref 98–112)
CO2 SERPL-SCNC: 23 MMOL/L (ref 21–32)
CREAT BLD-MCNC: 1.33 MG/DL
EGFRCR SERPLBLD CKD-EPI 2021: 43 ML/MIN/1.73M2 (ref 60–?)
EOSINOPHIL # BLD AUTO: 0.07 X10(3) UL (ref 0–0.7)
EOSINOPHIL NFR BLD AUTO: 0.9 %
ERYTHROCYTE [DISTWIDTH] IN BLOOD BY AUTOMATED COUNT: 12.4 %
GLOBULIN PLAS-MCNC: 4.4 G/DL (ref 2.8–4.4)
GLUCOSE BLD-MCNC: 112 MG/DL (ref 70–99)
HCT VFR BLD AUTO: 43.3 %
HGB BLD-MCNC: 14.6 G/DL
IMM GRANULOCYTES # BLD AUTO: 0.03 X10(3) UL (ref 0–1)
IMM GRANULOCYTES NFR BLD: 0.4 %
LYMPHOCYTES # BLD AUTO: 0.83 X10(3) UL (ref 1–4)
LYMPHOCYTES NFR BLD AUTO: 11.1 %
MCH RBC QN AUTO: 32.2 PG (ref 26–34)
MCHC RBC AUTO-ENTMCNC: 33.7 G/DL (ref 31–37)
MCV RBC AUTO: 95.6 FL
MONOCYTES # BLD AUTO: 0.72 X10(3) UL (ref 0.1–1)
MONOCYTES NFR BLD AUTO: 9.6 %
NEUTROPHILS # BLD AUTO: 5.81 X10 (3) UL (ref 1.5–7.7)
NEUTROPHILS # BLD AUTO: 5.81 X10(3) UL (ref 1.5–7.7)
NEUTROPHILS NFR BLD AUTO: 77.9 %
OSMOLALITY SERPL CALC.SUM OF ELEC: 286 MOSM/KG (ref 275–295)
PLATELET # BLD AUTO: 177 10(3)UL (ref 150–450)
POTASSIUM SERPL-SCNC: 4.2 MMOL/L (ref 3.5–5.1)
PROT SERPL-MCNC: 7.9 G/DL (ref 6.4–8.2)
RBC # BLD AUTO: 4.53 X10(6)UL
SODIUM SERPL-SCNC: 136 MMOL/L (ref 136–145)
WBC # BLD AUTO: 7.5 X10(3) UL (ref 4–11)

## 2023-09-19 PROCEDURE — 99285 EMERGENCY DEPT VISIT HI MDM: CPT

## 2023-09-19 PROCEDURE — 80053 COMPREHEN METABOLIC PANEL: CPT | Performed by: EMERGENCY MEDICINE

## 2023-09-19 PROCEDURE — 93970 EXTREMITY STUDY: CPT | Performed by: EMERGENCY MEDICINE

## 2023-09-19 PROCEDURE — 85025 COMPLETE CBC W/AUTO DIFF WBC: CPT | Performed by: EMERGENCY MEDICINE

## 2023-09-19 PROCEDURE — 36415 COLL VENOUS BLD VENIPUNCTURE: CPT

## 2023-09-19 NOTE — ED INITIAL ASSESSMENT (HPI)
Pt to ED c/o bilateral knee lymphedema. Pt reports she gets home health but hasn't had it in about 2 and a half weeks so wants to get evaluated for \"infection\". Pt reports that was taken care of at home and treated at end of August. Pt reports dyspnea on exertion. Pt reports bilateral knee pain. A&Ox4.

## 2023-09-19 NOTE — DISCHARGE INSTRUCTIONS
Please call the Lymphedema clinic to schedule an appointment, [phone]. They will put together a personalized, comprehensive treatment plan to manage and control lymphedema. If you have trouble scheduling an appointment, call the RN Case Manager for assistance, [phone]. The  will enter a referral for home health services, physical therapy for your shoulder and an RN to wrap your legs as recommended. The [facility] home health agency will call you at [phone] to initiate services.     Return for any problems  Follow-up to primary care physician this week

## 2023-09-19 NOTE — CM/SW NOTE
CM assistance requested. Patient to ED with lymphedema. Spoke to both patient and brother at bedside. Patient and brother live together. Provided information on the outpatient lymphedema clinic. Patient states she cannot drive but she uses non-emergent transportation for appointments.'  Patient is also agreeable to home health PT and RN. Lymphedema clinic phone number placed on AVS.  CM phone also listed in case patient has difficulty getting an appointment scheduled. Patient does not have a specific home health agency she wants to use. SW order placed. Face to face completed  Aidin referral placed. MD updated.

## 2023-09-20 ENCOUNTER — TELEPHONE (OUTPATIENT)
Dept: CASE MANAGEMENT | Facility: HOSPITAL | Age: 73
End: 2023-09-20

## 2023-09-20 NOTE — CM/SW NOTE
[facility] to contact patient directly to initiate services.

## 2023-10-22 ENCOUNTER — APPOINTMENT (OUTPATIENT)
Dept: ULTRASOUND IMAGING | Facility: HOSPITAL | Age: 73
End: 2023-10-22
Attending: EMERGENCY MEDICINE
Payer: MEDICARE

## 2023-10-22 ENCOUNTER — HOSPITAL ENCOUNTER (INPATIENT)
Facility: HOSPITAL | Age: 73
LOS: 3 days | Discharge: HOME | End: 2023-10-27
Attending: EMERGENCY MEDICINE
Payer: MEDICARE

## 2023-10-22 ENCOUNTER — HOSPITAL ENCOUNTER (INPATIENT)
Facility: HOSPITAL | Age: 73
LOS: 3 days | Discharge: HOME | End: 2023-10-27
Attending: EMERGENCY MEDICINE | Admitting: HOSPITALIST
Payer: MEDICARE

## 2023-10-22 DIAGNOSIS — I89.0 LYMPHEDEMA: ICD-10-CM

## 2023-10-22 DIAGNOSIS — L03.119 CELLULITIS OF LOWER EXTREMITY, UNSPECIFIED LATERALITY: Primary | ICD-10-CM

## 2023-10-22 LAB
ALBUMIN SERPL-MCNC: 3.3 G/DL (ref 3.4–5)
ALBUMIN/GLOB SERPL: 0.8 {RATIO} (ref 1–2)
ALP LIVER SERPL-CCNC: 97 U/L
ALT SERPL-CCNC: 26 U/L
ANION GAP SERPL CALC-SCNC: 3 MMOL/L (ref 0–18)
AST SERPL-CCNC: 39 U/L (ref 15–37)
BASOPHILS # BLD AUTO: 0.02 X10(3) UL (ref 0–0.2)
BASOPHILS NFR BLD AUTO: 0.3 %
BILIRUB SERPL-MCNC: 0.8 MG/DL (ref 0.1–2)
BUN BLD-MCNC: 30 MG/DL (ref 7–18)
CALCIUM BLD-MCNC: 9.5 MG/DL (ref 8.5–10.1)
CHLORIDE SERPL-SCNC: 109 MMOL/L (ref 98–112)
CO2 SERPL-SCNC: 25 MMOL/L (ref 21–32)
CREAT BLD-MCNC: 1.15 MG/DL
EGFRCR SERPLBLD CKD-EPI 2021: 51 ML/MIN/1.73M2 (ref 60–?)
EOSINOPHIL # BLD AUTO: 0.19 X10(3) UL (ref 0–0.7)
EOSINOPHIL NFR BLD AUTO: 2.6 %
ERYTHROCYTE [DISTWIDTH] IN BLOOD BY AUTOMATED COUNT: 12.8 %
GLOBULIN PLAS-MCNC: 4 G/DL (ref 2.8–4.4)
GLUCOSE BLD-MCNC: 116 MG/DL (ref 70–99)
HCT VFR BLD AUTO: 41.6 %
HGB BLD-MCNC: 13.8 G/DL
IMM GRANULOCYTES # BLD AUTO: 0.02 X10(3) UL (ref 0–1)
IMM GRANULOCYTES NFR BLD: 0.3 %
LACTATE SERPL-SCNC: 0.9 MMOL/L (ref 0.4–2)
LYMPHOCYTES # BLD AUTO: 0.9 X10(3) UL (ref 1–4)
LYMPHOCYTES NFR BLD AUTO: 12.3 %
MCH RBC QN AUTO: 32.3 PG (ref 26–34)
MCHC RBC AUTO-ENTMCNC: 33.2 G/DL (ref 31–37)
MCV RBC AUTO: 97.4 FL
MONOCYTES # BLD AUTO: 0.81 X10(3) UL (ref 0.1–1)
MONOCYTES NFR BLD AUTO: 11.1 %
NEUTROPHILS # BLD AUTO: 5.39 X10 (3) UL (ref 1.5–7.7)
NEUTROPHILS # BLD AUTO: 5.39 X10(3) UL (ref 1.5–7.7)
NEUTROPHILS NFR BLD AUTO: 73.4 %
OSMOLALITY SERPL CALC.SUM OF ELEC: 291 MOSM/KG (ref 275–295)
PLATELET # BLD AUTO: 171 10(3)UL (ref 150–450)
POTASSIUM SERPL-SCNC: 4 MMOL/L (ref 3.5–5.1)
PROT SERPL-MCNC: 7.3 G/DL (ref 6.4–8.2)
RBC # BLD AUTO: 4.27 X10(6)UL
SODIUM SERPL-SCNC: 137 MMOL/L (ref 136–145)
WBC # BLD AUTO: 7.3 X10(3) UL (ref 4–11)

## 2023-10-22 PROCEDURE — 99223 1ST HOSP IP/OBS HIGH 75: CPT | Performed by: STUDENT IN AN ORGANIZED HEALTH CARE EDUCATION/TRAINING PROGRAM

## 2023-10-22 PROCEDURE — 93970 EXTREMITY STUDY: CPT | Performed by: EMERGENCY MEDICINE

## 2023-10-22 RX ORDER — FUROSEMIDE 10 MG/ML
40 INJECTION INTRAMUSCULAR; INTRAVENOUS DAILY
Status: DISCONTINUED | OUTPATIENT
Start: 2023-10-23 | End: 2023-10-24

## 2023-10-22 RX ORDER — BISACODYL 10 MG
10 SUPPOSITORY, RECTAL RECTAL
Status: DISCONTINUED | OUTPATIENT
Start: 2023-10-22 | End: 2023-10-27

## 2023-10-22 RX ORDER — FUROSEMIDE 10 MG/ML
40 INJECTION INTRAMUSCULAR; INTRAVENOUS ONCE
Status: COMPLETED | OUTPATIENT
Start: 2023-10-22 | End: 2023-10-22

## 2023-10-22 RX ORDER — MELATONIN
3 NIGHTLY PRN
Status: DISCONTINUED | OUTPATIENT
Start: 2023-10-22 | End: 2023-10-27

## 2023-10-22 RX ORDER — SENNOSIDES 8.6 MG
17.2 TABLET ORAL NIGHTLY PRN
Status: DISCONTINUED | OUTPATIENT
Start: 2023-10-22 | End: 2023-10-27

## 2023-10-22 RX ORDER — ENEMA 19; 7 G/133ML; G/133ML
1 ENEMA RECTAL ONCE AS NEEDED
Status: DISCONTINUED | OUTPATIENT
Start: 2023-10-22 | End: 2023-10-27

## 2023-10-22 RX ORDER — ECHINACEA PURPUREA EXTRACT 125 MG
1 TABLET ORAL
Status: DISCONTINUED | OUTPATIENT
Start: 2023-10-22 | End: 2023-10-27

## 2023-10-22 RX ORDER — GUAIFENESIN 600 MG/1
600 TABLET, EXTENDED RELEASE ORAL 2 TIMES DAILY PRN
Status: DISCONTINUED | OUTPATIENT
Start: 2023-10-22 | End: 2023-10-27

## 2023-10-22 RX ORDER — METOCLOPRAMIDE HYDROCHLORIDE 5 MG/ML
5 INJECTION INTRAMUSCULAR; INTRAVENOUS EVERY 8 HOURS PRN
Status: DISCONTINUED | OUTPATIENT
Start: 2023-10-22 | End: 2023-10-27

## 2023-10-22 RX ORDER — ONDANSETRON 2 MG/ML
4 INJECTION INTRAMUSCULAR; INTRAVENOUS EVERY 6 HOURS PRN
Status: DISCONTINUED | OUTPATIENT
Start: 2023-10-22 | End: 2023-10-27

## 2023-10-22 RX ORDER — POLYETHYLENE GLYCOL 3350 17 G/17G
17 POWDER, FOR SOLUTION ORAL DAILY PRN
Status: DISCONTINUED | OUTPATIENT
Start: 2023-10-22 | End: 2023-10-27

## 2023-10-22 RX ORDER — BENZONATATE 200 MG/1
200 CAPSULE ORAL 3 TIMES DAILY PRN
Status: DISCONTINUED | OUTPATIENT
Start: 2023-10-22 | End: 2023-10-27

## 2023-10-22 RX ORDER — ACETAMINOPHEN 500 MG
500 TABLET ORAL EVERY 4 HOURS PRN
Status: DISCONTINUED | OUTPATIENT
Start: 2023-10-22 | End: 2023-10-27

## 2023-10-23 PROBLEM — I48.0 PAROXYSMAL ATRIAL FIBRILLATION (HCC): Chronic | Status: ACTIVE | Noted: 2023-10-23

## 2023-10-23 LAB
ALBUMIN SERPL-MCNC: 2.7 G/DL (ref 3.4–5)
ALBUMIN/GLOB SERPL: 0.9 {RATIO} (ref 1–2)
ALP LIVER SERPL-CCNC: 67 U/L
ALT SERPL-CCNC: 20 U/L
ANION GAP SERPL CALC-SCNC: 4 MMOL/L (ref 0–18)
AST SERPL-CCNC: 28 U/L (ref 15–37)
BASOPHILS # BLD AUTO: 0.01 X10(3) UL (ref 0–0.2)
BASOPHILS NFR BLD AUTO: 0.2 %
BILIRUB SERPL-MCNC: 1.1 MG/DL (ref 0.1–2)
BUN BLD-MCNC: 29 MG/DL (ref 7–18)
CALCIUM BLD-MCNC: 8.8 MG/DL (ref 8.5–10.1)
CHLORIDE SERPL-SCNC: 111 MMOL/L (ref 98–112)
CO2 SERPL-SCNC: 23 MMOL/L (ref 21–32)
CREAT BLD-MCNC: 0.94 MG/DL
EGFRCR SERPLBLD CKD-EPI 2021: 64 ML/MIN/1.73M2 (ref 60–?)
EOSINOPHIL # BLD AUTO: 0.27 X10(3) UL (ref 0–0.7)
EOSINOPHIL NFR BLD AUTO: 4.5 %
ERYTHROCYTE [DISTWIDTH] IN BLOOD BY AUTOMATED COUNT: 12.8 %
GLOBULIN PLAS-MCNC: 3.1 G/DL (ref 2.8–4.4)
GLUCOSE BLD-MCNC: 94 MG/DL (ref 70–99)
HCT VFR BLD AUTO: 34.9 %
HGB BLD-MCNC: 11.8 G/DL
IMM GRANULOCYTES # BLD AUTO: 0.01 X10(3) UL (ref 0–1)
IMM GRANULOCYTES NFR BLD: 0.2 %
LYMPHOCYTES # BLD AUTO: 1.12 X10(3) UL (ref 1–4)
LYMPHOCYTES NFR BLD AUTO: 18.5 %
MCH RBC QN AUTO: 33 PG (ref 26–34)
MCHC RBC AUTO-ENTMCNC: 33.8 G/DL (ref 31–37)
MCV RBC AUTO: 97.5 FL
MONOCYTES # BLD AUTO: 0.75 X10(3) UL (ref 0.1–1)
MONOCYTES NFR BLD AUTO: 12.4 %
NEUTROPHILS # BLD AUTO: 3.9 X10 (3) UL (ref 1.5–7.7)
NEUTROPHILS # BLD AUTO: 3.9 X10(3) UL (ref 1.5–7.7)
NEUTROPHILS NFR BLD AUTO: 64.2 %
OSMOLALITY SERPL CALC.SUM OF ELEC: 292 MOSM/KG (ref 275–295)
PLATELET # BLD AUTO: 136 10(3)UL (ref 150–450)
POTASSIUM SERPL-SCNC: 3.5 MMOL/L (ref 3.5–5.1)
PROT SERPL-MCNC: 5.8 G/DL (ref 6.4–8.2)
RBC # BLD AUTO: 3.58 X10(6)UL
SODIUM SERPL-SCNC: 138 MMOL/L (ref 136–145)
WBC # BLD AUTO: 6.1 X10(3) UL (ref 4–11)

## 2023-10-23 PROCEDURE — 99232 SBSQ HOSP IP/OBS MODERATE 35: CPT | Performed by: HOSPITALIST

## 2023-10-23 RX ORDER — METOPROLOL TARTRATE 50 MG/1
50 TABLET, FILM COATED ORAL NIGHTLY
Status: DISCONTINUED | OUTPATIENT
Start: 2023-10-23 | End: 2023-10-27

## 2023-10-23 RX ORDER — ATORVASTATIN CALCIUM 10 MG/1
10 TABLET, FILM COATED ORAL NIGHTLY
Status: DISCONTINUED | OUTPATIENT
Start: 2023-10-23 | End: 2023-10-27

## 2023-10-23 RX ORDER — METOPROLOL TARTRATE 50 MG/1
50 TABLET, FILM COATED ORAL 2 TIMES DAILY
Status: DISCONTINUED | OUTPATIENT
Start: 2023-10-23 | End: 2023-10-23

## 2023-10-23 RX ORDER — AMMONIUM LACTATE 12 G/100G
LOTION TOPICAL AS NEEDED
Status: DISCONTINUED | OUTPATIENT
Start: 2023-10-23 | End: 2023-10-27

## 2023-10-23 RX ORDER — LEVOTHYROXINE SODIUM 88 UG/1
88 TABLET ORAL
Status: DISCONTINUED | OUTPATIENT
Start: 2023-10-23 | End: 2023-10-27

## 2023-10-23 RX ORDER — METOPROLOL TARTRATE 100 MG/1
100 TABLET ORAL
Status: DISCONTINUED | OUTPATIENT
Start: 2023-10-23 | End: 2023-10-24

## 2023-10-23 RX ORDER — METOPROLOL TARTRATE 100 MG/1
100 TABLET ORAL EVERY MORNING
COMMUNITY

## 2023-10-23 NOTE — CONSULTS
[facility]  Report of Inpatient Wound Care Consultation    Gissel Donnelly Patient Status:  Observation    1950 MRN [id number]   [facility] 4NW-A Attending Mary Price [address] Day # 0 PCP Rosalynd Hashimoto     Reason for Consultation:  Lymphedema    History of Present Illness:  Gissel Donnelly is a a(n) 67year old female. Patient with multiple comorbidities, with present on admission skin breakdown described below. SUBJECTIVE:  I have had swelling like this for a long time. History:  Past Medical History:   Diagnosis Date    Cataract     right eye completed; future plan for left eye procceure    Disorder of thyroid     uses synthroid    Essential hypertension     High blood pressure     High cholesterol     Hx of pancreatitis     6 times    Hyperlipidemia     Lymphedema of both lower extremities     longstanding, unknown origin    Muscle weakness     Visual impairment      Past Surgical History:   Procedure Laterality Date    CATARACT      CHOLECYSTECTOMY        reports that she has never smoked. She has never used smokeless tobacco. She reports that she does not currently use alcohol. She reports that she does not currently use drugs.       Allergies:  @ALLERGY    Laboratory Data:    Recent Labs   Lab 10/22/23  2019 10/23/23  0559   WBC 7.3 6.1   HGB 13.8 11.8*   HCT 41.6 34.9*   .0 136.0*   CREATSERUM 1.15* 0.94   BUN 30* 29*   * 94   CA 9.5 8.8   ALB 3.3* 2.7*   TP 7.3 5.8*         ASSESSMENT:  Wound 23 Tibial Left (Active)   Date First Assessed/Time First Assessed: 23 1355   Location: Tibial  Wound Location Orientation: Left      Assessments 10/23/2023 11:06 AM   Wound Image         Wound 23 Tibial Right (Active)   Date First Assessed/Time First Assessed: 23 1356   Location: Tibial  Wound Location Orientation: Right      Assessments 10/23/2023 11:07 AM   Wound Image        No open wounds, dry legs noted, +lymphedema. Recommend use of Lac-hydrin to B LE daily post showering. RN and team aware. Inpatient wound care to sign off. Thank you for this consultation and for allowing me to participate in the care of your patient. Please page me at #[phone] if you have any questions about this consultation and plan of care. Time Spent 30 Minutes. Thank you,  Denise Portillo.  Lloyd Prasad, PT, MPT  Wound Care Clinician  [id number] [facility] Wound Care Team  10/23/2023

## 2023-10-23 NOTE — PHYSICAL THERAPY NOTE
PHYSICAL THERAPY EVALUATION - INPATIENT     Room Number: 417/417-A  Evaluation Date: 10/23/2023  Type of Evaluation: Initial  Physician Order: PT Eval and Treat    Presenting Problem: LE celluitis  Co-Morbidities : lymphedema, HTN, HL,. HT, obesity  Reason for Therapy: Mobility Dysfunction and Discharge Planning    History related to current admission: Patient is a 67year old female admitted on 10/22/2023: Presents with BLE swelling with hx of lymphedema dx cellulitis, US (-) DVT    5/26-5/31/23: lymphedema       ASSESSMENT   In this PT evaluation, the patient presents with the following impairments 1) Pt declined further ambulation than to bedside chair, however able to perform with SBA and RW. Demonstrated good dynamic standing balance while changing brief. These impairments and comorbidities manifest themselves as functional limitations in independent bed mobility, transfers, and gait. The patient is below baseline and would benefit from skilled inpatient PT to address the above deficits to assist patient in returning to prior to level of function. Functional outcome measures completed include AMPAC. The AM-PAC '6-Clicks' Inpatient Basic Mobility Short Form was completed and this patient is demonstrating a Approx Degree of Impairment: 28.97%  degree of impairment in mobility. Research supports that patients with this level of impairment may benefit from OP lymphedema therapy. DISCHARGE RECOMMENDATIONS  PT Discharge Recommendations: Outpatient PT (lymphedema therapy)    PLAN  PT Treatment Plan: Bed mobility; Body mechanics; Coordination; Endurance; Energy conservation;Patient education; Family education;Gait training;Range of motion; Neuromuscular re-educate;Strengthening;Transfer training;Balance training  Rehab Potential : Good  Frequency (Obs): 3x/week  Number of Visits to Meet Established Goals: 5      CURRENT GOALS    Goal #1 Patient is able to demonstrate supine - sit EOB @ level: independent Goal #2 Patient is able to demonstrate transfers EOB to/from Chair/Wheelchair at assistance level: modified independent     Goal #3 Patient is able to ambulate 150 feet with assist device: walker - rolling at assistance level: supervision     Goal #4    Goal #5    Goal #6    Goal Comments: Goals established on 10/23/2023    HOME SITUATION  Type of Home: [address] [place] [address]: One level                Lives With: Other (Comment) (brother assists with IADLs)  Drives: No  Patient Owned Equipment: Rolling walker  Patient Regularly Uses: Glasses    Prior Level of [place]: pt reports ambulates household distances with RW, her brother assists with IADLs including groceries and laundry, pt performs ADLs mod I, denies any falls in the past 6 months. SUBJECTIVE  \"I want to keep the purwick\"       OBJECTIVE  Precautions: None  Fall Risk: Standard fall risk    WEIGHT BEARING RESTRICTION  Weight Bearing Restriction: None                PAIN ASSESSMENT  Rating: Unable to rate  Location: L elbow  Management Techniques: Repositioning    COGNITION  Overall Cognitive Status:  WFL - within functional limits    RANGE OF MOTION AND STRENGTH ASSESSMENT  Upper extremity ROM and strength are within functional limits     Lower extremity ROM is within functional limits     Lower extremity strength is within functional limits       BALANCE  Static Sitting: Good  Dynamic Sitting: Good  Static Standing: Fair -  Dynamic Standing: Fair -    ADDITIONAL TESTS                                    ACTIVITY TOLERANCE                         O2 WALK       NEUROLOGICAL FINDINGS                        AM-PAC '6-Clicks' INPATIENT SHORT FORM - BASIC MOBILITY  How much difficulty does the patient currently have. ..   Patient Difficulty: Turning over in bed (including adjusting bedclothes, sheets and blankets)?: None   Patient Difficulty: Sitting down on and standing up from a chair with arms (e.g., wheelchair, bedside commode, etc.): None   Patient Difficulty: Moving from lying on back to sitting on the side of the bed?: None   How much help from another person does the patient currently need. .. Help from Another: Moving to and from a bed to a chair (including a wheelchair)?: None   Help from Another: Need to walk in hospital room?: A Little   Help from Another: Climbing 3-5 steps with a railing?: A Lot       AM-PAC Score:  Raw Score: 21   Approx Degree of Impairment: 28.97%   Standardized Score (AM-PAC Scale): 50.25   CMS Modifier (G-Code): CJ    FUNCTIONAL ABILITY STATUS  Gait Assessment   Functional Mobility/Gait Assessment  Gait Assistance: Supervision  Distance (ft): 25  Assistive Device: Rolling walker  Pattern: Shuffle    Skilled Therapy Provided     Bed Mobility:  Rolling: mod I   Supine to sit: mod I       Transfer Mobility:  Sit to stand: SBA *vc for UE placement    Stand to sit: SBA  Gait = SBA with RW    Therapist's Comments:  encouraged ambulation <> bathroom, pt declined and voided utilizing purwick which did not work appropriately, pt continued to decline ambulation <> bathroom, pt demonstrated good dynamic balance while therapists assisting pt with doffing/donning underwear    Exercise/Education Provided:  Bed mobility  Functional activity tolerated  Transfer training    Patient End of Session: Up in chair;Needs met;Call light within reach;RN aware of session/findings; All patient questions and concerns addressed      Patient Evaluation Complexity Level:  History Low - no personal factors and/or co-morbidities   Examination of body systems Low - addressing 1-2 elements   Clinical Presentation Low - Stable   Clinical Decision Making Low - Stable       PT Session Time: 30 minutes  Gait Training:  minutes  Therapeutic Activity: 20 minutes  Neuromuscular Re-education:  minutes  Therapeutic Exercise:  minutes

## 2023-10-23 NOTE — ED QUICK NOTES
Orders for admission, patient is aware of plan and ready to go upstairs. Any questions, please call ED RN  at extension [phone]. Patient Covid vaccination status: Fully vaccinated     COVID Test Ordered in ED: None    COVID Suspicion at Admission: N/A    Running Infusions:  None    Mental Status/LOC at time of transport: a/ox4.  Up w/ 2-3 assist. Baljinder Patino in place    Other pertinent information:   CIWA score: N/A   NIH score:  N/A

## 2023-10-23 NOTE — ED INITIAL ASSESSMENT (HPI)
Patient here with c/o bilateral leg swelling. Patient has hx of lymphedema. Patient reports she has to find another PT to wrap her legs. Patient reports she has not had her legs wrapped for 2 weeks and they are starting to crack. Patient also reports she bumped her feet on the walker which caused pain, feet more swollen too.   Denies fever

## 2023-10-23 NOTE — PLAN OF CARE
NURSING ADMISSION NOTE      Patient admitted via Cart  Oriented to room. Safety precautions initiated. Bed in low position. Call light in reach. Patient alert and oriented x4. VSS. Afebrile. Received from ED for bilateral lower extremity swelling. Med rec complete, MD notified. 9362: C/o elbow pain. PRN tylenol administered.  Hot pack provided

## 2023-10-23 NOTE — PROGRESS NOTES
[facility] Pharmacy Note:  Renal Adjustment for cefepime (MAXIPIME)    Iesha Bonilla is a 67year old patient who has been prescribed cefepime (MAXIPIME) 1 gm  every 8 hrs. The estimated creatinine clearance is 36.6 mL/min (A) (based on SCr of 1.15 mg/dL (H)). The dose has been adjusted to cefepime (MAXIPIME) 1 gm every 12 hrs per hospital renal dose adjustment protocol for treatment of cellulitis. Pharmacy will follow and adjust dose as warranted for additional renal function changes.     Thank you,    Manuella Alpers, PharmD  10/22/2023  11:30 PM

## 2023-10-24 PROCEDURE — 99232 SBSQ HOSP IP/OBS MODERATE 35: CPT | Performed by: HOSPITALIST

## 2023-10-24 RX ORDER — TRAMADOL HYDROCHLORIDE 50 MG/1
50 TABLET ORAL EVERY 6 HOURS PRN
Status: DISCONTINUED | OUTPATIENT
Start: 2023-10-24 | End: 2023-10-27

## 2023-10-24 RX ORDER — METOPROLOL TARTRATE 100 MG/1
100 TABLET ORAL
Status: DISCONTINUED | OUTPATIENT
Start: 2023-10-25 | End: 2023-10-27

## 2023-10-24 RX ORDER — METOPROLOL TARTRATE 100 MG/1
100 TABLET ORAL
Status: DISCONTINUED | OUTPATIENT
Start: 2023-10-24 | End: 2023-10-24

## 2023-10-24 RX ORDER — TORSEMIDE 20 MG/1
40 TABLET ORAL DAILY
Status: DISCONTINUED | OUTPATIENT
Start: 2023-10-24 | End: 2023-10-24

## 2023-10-24 RX ORDER — TORSEMIDE 20 MG/1
40 TABLET ORAL DAILY
Status: DISCONTINUED | OUTPATIENT
Start: 2023-10-25 | End: 2023-10-27

## 2023-10-24 NOTE — PROGRESS NOTES
Pt A&Ox4 on room air, complaints of some pain in her extremities due to arthritis but declines any pain medication, tolerating medications well per mar. Has extensive swelling, redness, and blisters in her legs. Fall risk and safety precautions in place. Call light within reach, frequent checks made, needs met.

## 2023-10-25 PROCEDURE — 99233 SBSQ HOSP IP/OBS HIGH 50: CPT | Performed by: HOSPITALIST

## 2023-10-25 RX ORDER — VANCOMYCIN 1.75 GRAM/500 ML IN 0.9 % SODIUM CHLORIDE INTRAVENOUS
15 EVERY 24 HOURS
Status: DISCONTINUED | OUTPATIENT
Start: 2023-10-25 | End: 2023-10-25

## 2023-10-25 RX ORDER — CEFAZOLIN SODIUM/WATER 2 G/20 ML
2 SYRINGE (ML) INTRAVENOUS EVERY 8 HOURS
Status: DISCONTINUED | OUTPATIENT
Start: 2023-10-25 | End: 2023-10-27

## 2023-10-25 NOTE — PLAN OF CARE
Patient alert and oriented x4. VSS. Afebrile. C/o shoulder + knee pain. Medications administered per MAR. Redness & swelling to BLE. Safety precautions continued. Call light within reach. 1844: Received call from microbiology for positive aerobic culture. MD notified, orders received. Problem: SKIN/TISSUE INTEGRITY - ADULT  Goal: Incision(s), wounds(s) or drain site(s) healing without S/S of infection  Description: INTERVENTIONS:  - Assess and document risk factors for pressure ulcer development  - Assess and document skin integrity  - Assess and document dressing/incision, wound bed, drain sites and surrounding tissue  - Implement wound care per orders  - Initiate isolation precautions as appropriate  - Initiate Pressure Ulcer prevention bundle as indicated  Outcome: Progressing     Problem: SAFETY ADULT - FALL  Goal: Free from fall injury  Description: INTERVENTIONS:  - Assess pt frequently for physical needs  - Identify cognitive and physical deficits and behaviors that affect risk of falls.   - [place] fall precautions as indicated by assessment.  - Educate pt/family on patient safety including physical limitations  - Instruct pt to call for assistance with activity based on assessment  - Modify environment to reduce risk of injury  - Provide assistive devices as appropriate  - Consider OT/PT consult to assist with strengthening/mobility  - Encourage toileting schedule  Outcome: Progressing

## 2023-10-25 NOTE — PHYSICAL THERAPY NOTE
IP PT attempted, pt laying in bed, politely refusing 2/2 purewick and diuretic \"constantly working\" . Will re-attempt as schedule permits.

## 2023-10-25 NOTE — PLAN OF CARE
Pt A&Ox4. Occasional pain managed with PO meds. Remained in bed throughout the day. BLE redness and blistering decreasing within marked borders. Topical ointment applied. Antifungal powder applied under skin folds and arms. Fall and safety precautions in place.      Problem: SKIN/TISSUE INTEGRITY - ADULT  Goal: Incision(s), wounds(s) or drain site(s) healing without S/S of infection  Description: INTERVENTIONS:  - Assess and document risk factors for pressure ulcer development  - Assess and document skin integrity  - Assess and document dressing/incision, wound bed, drain sites and surrounding tissue  - Implement wound care per orders  - Initiate isolation precautions as appropriate  - Initiate Pressure Ulcer prevention bundle as indicated  Outcome: Progressing

## 2023-10-25 NOTE — PLAN OF CARE
Pt Aox4. VSS. RA. No complain N/V/D. Reported pain of arms and legs due to arthritis. Tramadol 50 mg administered PRN x1. Good appetite. BLE redness decreased. Antibiotics administered per order. Ammonium lactate lotion applied. Redness under L breast and R armpit. Miconazole powder applied PRN. OT consulted for lymphedema wraps. Will continue plan of care. Problem: SKIN/TISSUE INTEGRITY - ADULT  Goal: Incision(s), wounds(s) or drain site(s) healing without S/S of infection  Description: INTERVENTIONS:  - Assess and document risk factors for pressure ulcer development  - Assess and document skin integrity  - Assess and document dressing/incision, wound bed, drain sites and surrounding tissue  - Implement wound care per orders  - Initiate isolation precautions as appropriate  - Initiate Pressure Ulcer prevention bundle as indicated  Outcome: Progressing     Problem: MUSCULOSKELETAL - ADULT  Goal: Return mobility to safest level of function  Description: INTERVENTIONS:  - Assess patient stability and activity tolerance for standing, transferring and ambulating w/ or w/o assistive devices  - Assist with transfers and ambulation using safe patient handling equipment as needed  - Ensure adequate protection for wounds/incisions during mobilization  - Obtain PT/OT consults as needed  - Advance activity as appropriate  - Communicate ordered activity level and limitations with patient/family  Outcome: Progressing     Problem: SAFETY ADULT - FALL  Goal: Free from fall injury  Description: INTERVENTIONS:  - Assess pt frequently for physical needs  - Identify cognitive and physical deficits and behaviors that affect risk of falls.   - [place] fall precautions as indicated by assessment.  - Educate pt/family on patient safety including physical limitations  - Instruct pt to call for assistance with activity based on assessment  - Modify environment to reduce risk of injury  - Provide assistive devices as appropriate  - Consider OT/PT consult to assist with strengthening/mobility  - Encourage toileting schedule  Outcome: Progressing

## 2023-10-26 PROCEDURE — 99232 SBSQ HOSP IP/OBS MODERATE 35: CPT | Performed by: HOSPITALIST

## 2023-10-26 NOTE — PLAN OF CARE
Patient alert and oriented, vitals stable overnight, no fever. BLE redness improving. Patient still on iV Ancef. BC negative, ok to discharge per ID. No new complaints overnight. Safety precautions in place, call light within reach, plan of care ongoing. Problem: SKIN/TISSUE INTEGRITY - ADULT  Goal: Incision(s), wounds(s) or drain site(s) healing without S/S of infection  Description: INTERVENTIONS:  - Assess and document risk factors for pressure ulcer development  - Assess and document skin integrity  - Assess and document dressing/incision, wound bed, drain sites and surrounding tissue  - Implement wound care per orders  - Initiate isolation precautions as appropriate  - Initiate Pressure Ulcer prevention bundle as indicated  Outcome: Progressing     Problem: SAFETY ADULT - FALL  Goal: Free from fall injury  Description: INTERVENTIONS:  - Assess pt frequently for physical needs  - Identify cognitive and physical deficits and behaviors that affect risk of falls.   - [place] fall precautions as indicated by assessment.  - Educate pt/family on patient safety including physical limitations  - Instruct pt to call for assistance with activity based on assessment  - Modify environment to reduce risk of injury  - Provide assistive devices as appropriate  - Consider OT/PT consult to assist with strengthening/mobility  - Encourage toileting schedule  Outcome: Progressing

## 2023-10-26 NOTE — DISCHARGE INSTRUCTIONS
An order for outpatient lymphedema clinic has been placed. A paper copy will be in your discharge paperwork. Please call [phone] to schedule as soon as possible.   [place] Location closest to you:  [facility]  [address] [facility]  [place] [facility]

## 2023-10-26 NOTE — CM/SW NOTE
10/26/23 1100   CM/SW Referral Data   Referral Source Social Work (self-referral)   Reason for Referral Discharge planning   Informant Patient   Patient Info   Patient's [address]   Discharge Needs   Anticipated D/C needs To be determined     SW met with patient at bedside to discuss DC planning. SW confirmed with patient that she is not current with any [facility] agency and that she would rather do her lymphedema treatments OP if possible. Patient has a ride assist program that allows her 2 rides a week and she would like to use them to come to the OP clinic for her lymphedema treatments. SW was also notified by RN that there is a possibilty patient will be Dc'ed with IV ABX. SW discussed this with patient and what the options would be for administration. Patient stated \"I cannot do my own IV ABX at home that is just not an option\". Patient also reported that she does not have enough ride assist rides in order to come to the OP cancer center for daily infusions and does not think it is worth it to go to [facility] for a \"5 minute infusion\". At this time patient is refusing HH and would like to DC home on PO ABX with a referral to the OP Lymphedema clinic. DUNCAN notified treatment team.     Elzbieta Crawford will continue to follow for plan of care changes and remain available for any additional DC needs or concerns.      Renee King MSW, [facility]  Discharge Planner   [id number]

## 2023-10-26 NOTE — PHYSICAL THERAPY NOTE
PHYSICAL THERAPY TREATMENT NOTE - INPATIENT    Room Number: 417/417-A     Session: 1     Number of Visits to Meet Established Goals: 5    Presenting Problem: LE celluitis  Co-Morbidities : lymphedema, HTN, HL,. HT, obesity  ASSESSMENT     Pt demonstrated comparable mobility compared to initial evaluation. Required increased time and encouragement to participate as expressing concerns regarding purewick. Able to ambulate <> the bathroom with slow gait speed, RW, and SBA. The patient is below baseline and would benefit from skilled inpatient PT to address the above deficits to assist patient in returning to prior to level of function. DISCHARGE RECOMMENDATIONS  PT Discharge Recommendations: Outpatient PT (lymphedema therapy)     PLAN  PT Treatment Plan: Bed mobility; Body mechanics; Coordination; Endurance; Energy conservation;Patient education; Family education;Gait training;Range of motion; Neuromuscular re-educate;Strengthening;Transfer training;Balance training  Rehab Potential : Good  Frequency (Obs): 3x/week    CURRENT GOALS     Goal #1 Patient is able to demonstrate supine - sit EOB @ level: independent      Goal #2 Patient is able to demonstrate transfers EOB to/from Chair/Wheelchair at assistance level: modified independent      Goal #3 Patient is able to ambulate 150 feet with assist device: walker - rolling at assistance level: supervision      Goal #4     Goal #5     Goal #6     Goal Comments: Goals established on 10/23/2023    10/26/2023 all goals ongoing      SUBJECTIVE  Pt informed of dc order by RN, pt expressing frustration stating she does not feel as if she is ready to go home    OBJECTIVE  Precautions: None    WEIGHT BEARING RESTRICTION  Weight Bearing Restriction: None                PAIN ASSESSMENT   Rating: Unable to rate  Location: R wrist, posterior aspect of knees  Management Techniques: Repositioning; Activity promotion    BALANCE Static Sitting: Good  Dynamic Sitting: Good           Static Standing: Fair -  Dynamic Standing: Fair -    ACTIVITY TOLERANCE                         O2 WALK         AM-PAC '6-Clicks' INPATIENT SHORT FORM - BASIC MOBILITY  How much difficulty does the patient currently have. .. Patient Difficulty: Turning over in bed (including adjusting bedclothes, sheets and blankets)?: None   Patient Difficulty: Sitting down on and standing up from a chair with arms (e.g., wheelchair, bedside commode, etc.): None   Patient Difficulty: Moving from lying on back to sitting on the side of the bed?: None   How much help from another person does the patient currently need. .. Help from Another: Moving to and from a bed to a chair (including a wheelchair)?: None   Help from Another: Need to walk in hospital room?: A Little   Help from Another: Climbing 3-5 steps with a railing?: A Lot       AM-PAC Score:  Raw Score: 21   Approx Degree of Impairment: 28.97%   Standardized Score (AM-PAC Scale): 50.25   CMS Modifier (G-Code): CJ    FUNCTIONAL ABILITY STATUS  Gait Assessment   Functional Mobility/Gait Assessment  Gait Assistance: Supervision  Distance (ft): 20,20  Assistive Device: Rolling walker  Pattern: Shuffle    Skilled Therapy Provided    Bed Mobility:  Rolling: indep   Supine<>Sit: mod I       Transfer Mobility:  Sit<>Stand: SBA   Stand<>Sit: SBA   Gait: SBA with RW, educated on EC and pacing techniques     Therapist's Comments: increased time providing encouragement to participate. Pt demonstrating fair (-) dynamic standing balance in bathroom while performing pericare. Able to perform STS transfer from standard toilet height mod I. Encouraged/recommend continued ambulation <> bathroom with staff and RW. Patient End of Session: Up in chair;Needs met;Call light within reach;RN aware of session/findings; All patient questions and concerns addressed    PT Session Time: 45 minutes  Gait Training:  minutes  Therapeutic Activity: 45 minutes  Therapeutic Exercise:  minutes   Neuromuscular Re-education:  minutes

## 2023-10-26 NOTE — PLAN OF CARE
Patient is alert and orientated x4, VSS, RA, afebrile and complains of pain to legs which is relived with Tramadol. Patient was up and to the bathroom with walker. PT/OT worked with patient and lymphedema outpatient was ordered. All meds given per [facility] including IV Ancef. Plan to discharge tomorrow. Call light and safety precautions are in place. Problem: SKIN/TISSUE INTEGRITY - ADULT  Goal: Incision(s), wounds(s) or drain site(s) healing without S/S of infection  Description: INTERVENTIONS:  - Assess and document risk factors for pressure ulcer development  - Assess and document skin integrity  - Assess and document dressing/incision, wound bed, drain sites and surrounding tissue  - Implement wound care per orders  - Initiate isolation precautions as appropriate  - Initiate Pressure Ulcer prevention bundle as indicated  Outcome: Progressing     Problem: MUSCULOSKELETAL - ADULT  Goal: Return mobility to safest level of function  Description: INTERVENTIONS:  - Assess patient stability and activity tolerance for standing, transferring and ambulating w/ or w/o assistive devices  - Assist with transfers and ambulation using safe patient handling equipment as needed  - Ensure adequate protection for wounds/incisions during mobilization  - Obtain PT/OT consults as needed  - Advance activity as appropriate  - Communicate ordered activity level and limitations with patient/family  Outcome: Progressing     Problem: SAFETY ADULT - FALL  Goal: Free from fall injury  Description: INTERVENTIONS:  - Assess pt frequently for physical needs  - Identify cognitive and physical deficits and behaviors that affect risk of falls.   - [place] fall precautions as indicated by assessment.  - Educate pt/family on patient safety including physical limitations  - Instruct pt to call for assistance with activity based on assessment  - Modify environment to reduce risk of injury  - Provide assistive devices as appropriate  - Consider OT/PT consult to assist with strengthening/mobility  - Encourage toileting schedule  Outcome: Progressing

## 2023-10-27 VITALS
RESPIRATION RATE: 18 BRPM | TEMPERATURE: 98 F | DIASTOLIC BLOOD PRESSURE: 75 MMHG | WEIGHT: 249.13 LBS | OXYGEN SATURATION: 100 % | SYSTOLIC BLOOD PRESSURE: 129 MMHG | BODY MASS INDEX: 44.14 KG/M2 | HEIGHT: 63 IN | HEART RATE: 62 BPM

## 2023-10-27 PROCEDURE — 99239 HOSP IP/OBS DSCHRG MGMT >30: CPT | Performed by: HOSPITALIST

## 2023-10-27 RX ORDER — CEFADROXIL 500 MG/1
500 CAPSULE ORAL 2 TIMES DAILY
Qty: 10 CAPSULE | Refills: 0 | Status: SHIPPED | OUTPATIENT
Start: 2023-10-27 | End: 2023-11-01

## 2023-10-27 RX ORDER — CEFADROXIL 500 MG/1
500 CAPSULE ORAL 2 TIMES DAILY
Qty: 10 CAPSULE | Refills: 0 | Status: SHIPPED | OUTPATIENT
Start: 2023-10-27 | End: 2023-10-27

## 2023-10-27 NOTE — PLAN OF CARE
Patient is alert and orientated, VSS, RA, afebrile and complains arthritic pain relieved with Tramadol. Patient received order for the out patient lymphedema clinic and will contact after being discharged. Overall patient stated that she feels better. All meds given per [facility] and IV ancef. Plan for discharge back home. Call light and safety precautions are in place. Plan for discharge. Problem: SKIN/TISSUE INTEGRITY - ADULT  Goal: Incision(s), wounds(s) or drain site(s) healing without S/S of infection  Description: INTERVENTIONS:  - Assess and document risk factors for pressure ulcer development  - Assess and document skin integrity  - Assess and document dressing/incision, wound bed, drain sites and surrounding tissue  - Implement wound care per orders  - Initiate isolation precautions as appropriate  - Initiate Pressure Ulcer prevention bundle as indicated  Outcome: Progressing     Problem: MUSCULOSKELETAL - ADULT  Goal: Return mobility to safest level of function  Description: INTERVENTIONS:  - Assess patient stability and activity tolerance for standing, transferring and ambulating w/ or w/o assistive devices  - Assist with transfers and ambulation using safe patient handling equipment as needed  - Ensure adequate protection for wounds/incisions during mobilization  - Obtain PT/OT consults as needed  - Advance activity as appropriate  - Communicate ordered activity level and limitations with patient/family  Outcome: Progressing     Problem: SAFETY ADULT - FALL  Goal: Free from fall injury  Description: INTERVENTIONS:  - Assess pt frequently for physical needs  - Identify cognitive and physical deficits and behaviors that affect risk of falls.   - [place] fall precautions as indicated by assessment.  - Educate pt/family on patient safety including physical limitations  - Instruct pt to call for assistance with activity based on assessment  - Modify environment to reduce risk of injury  - Provide assistive devices as appropriate  - Consider OT/PT consult to assist with strengthening/mobility  - Encourage toileting schedule  Outcome: Progressing

## 2023-10-27 NOTE — PLAN OF CARE
Patient is alert and oriented, vitals stable, no fever overnight. All meds given per [facility]. Patient denies pain or sob. Plan for discharge home with [facility] in the morning. Safety precautions in place, plan of care ongoing. Problem: SKIN/TISSUE INTEGRITY - ADULT  Goal: Incision(s), wounds(s) or drain site(s) healing without S/S of infection  Description: INTERVENTIONS:  - Assess and document risk factors for pressure ulcer development  - Assess and document skin integrity  - Assess and document dressing/incision, wound bed, drain sites and surrounding tissue  - Implement wound care per orders  - Initiate isolation precautions as appropriate  - Initiate Pressure Ulcer prevention bundle as indicated  Outcome: Progressing     Problem: MUSCULOSKELETAL - ADULT  Goal: Return mobility to safest level of function  Description: INTERVENTIONS:  - Assess patient stability and activity tolerance for standing, transferring and ambulating w/ or w/o assistive devices  - Assist with transfers and ambulation using safe patient handling equipment as needed  - Ensure adequate protection for wounds/incisions during mobilization  - Obtain PT/OT consults as needed  - Advance activity as appropriate  - Communicate ordered activity level and limitations with patient/family  Outcome: Progressing     Problem: SAFETY ADULT - FALL  Goal: Free from fall injury  Description: INTERVENTIONS:  - Assess pt frequently for physical needs  - Identify cognitive and physical deficits and behaviors that affect risk of falls.   - [place] fall precautions as indicated by assessment.  - Educate pt/family on patient safety including physical limitations  - Instruct pt to call for assistance with activity based on assessment  - Modify environment to reduce risk of injury  - Provide assistive devices as appropriate  - Consider OT/PT consult to assist with strengthening/mobility  - Encourage toileting schedule  Outcome: Progressing

## 2023-10-27 NOTE — CM/SW NOTE
10/27/23 1400   Discharge disposition   Expected discharge disposition Home or Self   Discharge transportation [address] met with patient at bedside to discuss DC transportation. Pt to transport via [address] patient/family notified transport is not covered by insurance. Pt/family are agreeable to the charges. Transportation arranged for 5:15pm     SW will continue to follow for plan of care changes and remain available for any additional DC needs or concerns.      Timmy Beckett MSW, [facility]  Discharge Planner   [phone]

## 2023-10-27 NOTE — PROGRESS NOTES
NURSING DISCHARGE NOTE    Discharged Home via Wheelchair. Accompanied by Support staff  Belongings Taken by patient/family. Removed IV and tolerated well. All belongings were taken by patient. All questions were answered. Meds to beds delivered abx. Patient was taken by Romi Stephens.

## 2023-11-08 ENCOUNTER — HOSPITAL ENCOUNTER (INPATIENT)
Facility: HOSPITAL | Age: 73
LOS: 8 days | Discharge: NURSING, REHAB OR LONG-TERM CARE | End: 2023-11-17
Attending: EMERGENCY MEDICINE | Admitting: HOSPITALIST
Payer: MEDICARE

## 2023-11-08 ENCOUNTER — APPOINTMENT (OUTPATIENT)
Dept: CT IMAGING | Facility: HOSPITAL | Age: 73
End: 2023-11-08
Attending: EMERGENCY MEDICINE
Payer: MEDICARE

## 2023-11-08 ENCOUNTER — APPOINTMENT (OUTPATIENT)
Dept: GENERAL RADIOLOGY | Facility: HOSPITAL | Age: 73
End: 2023-11-08
Attending: EMERGENCY MEDICINE
Payer: MEDICARE

## 2023-11-08 DIAGNOSIS — L03.119 CELLULITIS OF LOWER EXTREMITY, UNSPECIFIED LATERALITY: ICD-10-CM

## 2023-11-08 DIAGNOSIS — R41.82 ALTERED MENTAL STATUS, UNSPECIFIED ALTERED MENTAL STATUS TYPE: ICD-10-CM

## 2023-11-08 DIAGNOSIS — N17.9 AKI (ACUTE KIDNEY INJURY) (HCC): ICD-10-CM

## 2023-11-08 DIAGNOSIS — N39.0 URINARY TRACT INFECTION WITHOUT HEMATURIA, SITE UNSPECIFIED: Primary | ICD-10-CM

## 2023-11-08 LAB
ALBUMIN SERPL-MCNC: 3.4 G/DL (ref 3.4–5)
ALBUMIN/GLOB SERPL: 0.8 {RATIO} (ref 1–2)
ALP LIVER SERPL-CCNC: 83 U/L
ALT SERPL-CCNC: 22 U/L
ANION GAP SERPL CALC-SCNC: 10 MMOL/L (ref 0–18)
AST SERPL-CCNC: 47 U/L (ref 15–37)
BASOPHILS # BLD AUTO: 0.01 X10(3) UL (ref 0–0.2)
BASOPHILS NFR BLD AUTO: 0.1 %
BILIRUB SERPL-MCNC: 1.4 MG/DL (ref 0.1–2)
BILIRUB UR QL STRIP.AUTO: NEGATIVE
BUN BLD-MCNC: 56 MG/DL (ref 9–23)
CALCIUM BLD-MCNC: 9.4 MG/DL (ref 8.5–10.1)
CHLORIDE SERPL-SCNC: 100 MMOL/L (ref 98–112)
CO2 SERPL-SCNC: 23 MMOL/L (ref 21–32)
COLOR UR AUTO: YELLOW
CREAT BLD-MCNC: 2.18 MG/DL
EGFRCR SERPLBLD CKD-EPI 2021: 23 ML/MIN/1.73M2 (ref 60–?)
EOSINOPHIL # BLD AUTO: 0.01 X10(3) UL (ref 0–0.7)
EOSINOPHIL NFR BLD AUTO: 0.1 %
ERYTHROCYTE [DISTWIDTH] IN BLOOD BY AUTOMATED COUNT: 12.4 %
GLOBULIN PLAS-MCNC: 4.2 G/DL (ref 2.8–4.4)
GLUCOSE BLD-MCNC: 113 MG/DL (ref 70–99)
GLUCOSE UR STRIP.AUTO-MCNC: NORMAL MG/DL
HCT VFR BLD AUTO: 38.2 %
HGB BLD-MCNC: 13 G/DL
HYALINE CASTS #/AREA URNS AUTO: PRESENT /LPF
IMM GRANULOCYTES # BLD AUTO: 0.03 X10(3) UL (ref 0–1)
IMM GRANULOCYTES NFR BLD: 0.3 %
KETONES UR STRIP.AUTO-MCNC: NEGATIVE MG/DL
LEUKOCYTE ESTERASE UR QL STRIP.AUTO: 250
LYMPHOCYTES # BLD AUTO: 0.67 X10(3) UL (ref 1–4)
LYMPHOCYTES NFR BLD AUTO: 7.2 %
MCH RBC QN AUTO: 32.8 PG (ref 26–34)
MCHC RBC AUTO-ENTMCNC: 34 G/DL (ref 31–37)
MCV RBC AUTO: 96.5 FL
MONOCYTES # BLD AUTO: 0.79 X10(3) UL (ref 0.1–1)
MONOCYTES NFR BLD AUTO: 8.4 %
NEUTROPHILS # BLD AUTO: 7.85 X10 (3) UL (ref 1.5–7.7)
NEUTROPHILS # BLD AUTO: 7.85 X10(3) UL (ref 1.5–7.7)
NEUTROPHILS NFR BLD AUTO: 83.9 %
NITRITE UR QL STRIP.AUTO: NEGATIVE
OSMOLALITY SERPL CALC.SUM OF ELEC: 292 MOSM/KG (ref 275–295)
PH UR STRIP.AUTO: 5 [PH] (ref 5–8)
PLATELET # BLD AUTO: 185 10(3)UL (ref 150–450)
POTASSIUM SERPL-SCNC: 4.8 MMOL/L (ref 3.5–5.1)
PROT SERPL-MCNC: 7.6 G/DL (ref 6.4–8.2)
PROT UR STRIP.AUTO-MCNC: 20 MG/DL
RBC # BLD AUTO: 3.96 X10(6)UL
RBC UR QL AUTO: NEGATIVE
SODIUM SERPL-SCNC: 133 MMOL/L (ref 136–145)
SP GR UR STRIP.AUTO: 1.02 (ref 1–1.03)
T4 FREE SERPL-MCNC: 1.5 NG/DL (ref 0.8–1.7)
TSI SER-ACNC: 4.98 MIU/ML (ref 0.36–3.74)
UROBILINOGEN UR STRIP.AUTO-MCNC: NORMAL MG/DL
WBC # BLD AUTO: 9.4 X10(3) UL (ref 4–11)

## 2023-11-08 PROCEDURE — 71045 X-RAY EXAM CHEST 1 VIEW: CPT | Performed by: EMERGENCY MEDICINE

## 2023-11-08 PROCEDURE — 99223 1ST HOSP IP/OBS HIGH 75: CPT | Performed by: HOSPITALIST

## 2023-11-08 PROCEDURE — 70450 CT HEAD/BRAIN W/O DYE: CPT | Performed by: EMERGENCY MEDICINE

## 2023-11-09 PROBLEM — N17.9 AKI (ACUTE KIDNEY INJURY) (HCC): Status: ACTIVE | Noted: 2023-11-09

## 2023-11-09 PROBLEM — E07.9 DISORDER OF THYROID: Chronic | Status: ACTIVE | Noted: 2023-11-09

## 2023-11-09 PROBLEM — R41.82 ALTERED MENTAL STATUS, UNSPECIFIED ALTERED MENTAL STATUS TYPE: Status: ACTIVE | Noted: 2023-11-09

## 2023-11-09 LAB
ANION GAP SERPL CALC-SCNC: 8 MMOL/L (ref 0–18)
ATRIAL RATE: 69 BPM
BASOPHILS # BLD AUTO: 0.01 X10(3) UL (ref 0–0.2)
BASOPHILS NFR BLD AUTO: 0.1 %
BUN BLD-MCNC: 52 MG/DL (ref 9–23)
CALCIUM BLD-MCNC: 9 MG/DL (ref 8.5–10.1)
CHLORIDE SERPL-SCNC: 102 MMOL/L (ref 98–112)
CO2 SERPL-SCNC: 24 MMOL/L (ref 21–32)
CREAT BLD-MCNC: 1.79 MG/DL
EGFRCR SERPLBLD CKD-EPI 2021: 30 ML/MIN/1.73M2 (ref 60–?)
EOSINOPHIL # BLD AUTO: 0.23 X10(3) UL (ref 0–0.7)
EOSINOPHIL NFR BLD AUTO: 3.1 %
ERYTHROCYTE [DISTWIDTH] IN BLOOD BY AUTOMATED COUNT: 12.6 %
GLUCOSE BLD-MCNC: 90 MG/DL (ref 70–99)
HCT VFR BLD AUTO: 32.7 %
HGB BLD-MCNC: 11.4 G/DL
IMM GRANULOCYTES # BLD AUTO: 0.02 X10(3) UL (ref 0–1)
IMM GRANULOCYTES NFR BLD: 0.3 %
LYMPHOCYTES # BLD AUTO: 0.92 X10(3) UL (ref 1–4)
LYMPHOCYTES NFR BLD AUTO: 12.3 %
MCH RBC QN AUTO: 33.2 PG (ref 26–34)
MCHC RBC AUTO-ENTMCNC: 34.9 G/DL (ref 31–37)
MCV RBC AUTO: 95.3 FL
MONOCYTES # BLD AUTO: 0.93 X10(3) UL (ref 0.1–1)
MONOCYTES NFR BLD AUTO: 12.4 %
NEUTROPHILS # BLD AUTO: 5.37 X10 (3) UL (ref 1.5–7.7)
NEUTROPHILS # BLD AUTO: 5.37 X10(3) UL (ref 1.5–7.7)
NEUTROPHILS NFR BLD AUTO: 71.8 %
OSMOLALITY SERPL CALC.SUM OF ELEC: 292 MOSM/KG (ref 275–295)
P AXIS: 12 DEGREES
P-R INTERVAL: 198 MS
PLATELET # BLD AUTO: 162 10(3)UL (ref 150–450)
POTASSIUM SERPL-SCNC: 3.7 MMOL/L (ref 3.5–5.1)
Q-T INTERVAL: 424 MS
QRS DURATION: 102 MS
QTC CALCULATION (BEZET): 454 MS
R AXIS: -22 DEGREES
RBC # BLD AUTO: 3.43 X10(6)UL
SODIUM SERPL-SCNC: 134 MMOL/L (ref 136–145)
T AXIS: -10 DEGREES
VENTRICULAR RATE: 69 BPM
WBC # BLD AUTO: 7.5 X10(3) UL (ref 4–11)

## 2023-11-09 PROCEDURE — 99232 SBSQ HOSP IP/OBS MODERATE 35: CPT | Performed by: HOSPITALIST

## 2023-11-09 RX ORDER — ATORVASTATIN CALCIUM 10 MG/1
10 TABLET, FILM COATED ORAL NIGHTLY
Status: DISCONTINUED | OUTPATIENT
Start: 2023-11-09 | End: 2023-11-17

## 2023-11-09 RX ORDER — SODIUM CHLORIDE 9 MG/ML
INJECTION, SOLUTION INTRAVENOUS CONTINUOUS
Status: DISCONTINUED | OUTPATIENT
Start: 2023-11-09 | End: 2023-11-17

## 2023-11-09 RX ORDER — LEVOTHYROXINE SODIUM 88 UG/1
88 TABLET ORAL
Status: DISCONTINUED | OUTPATIENT
Start: 2023-11-09 | End: 2023-11-17

## 2023-11-09 RX ORDER — METOCLOPRAMIDE HYDROCHLORIDE 5 MG/ML
5 INJECTION INTRAMUSCULAR; INTRAVENOUS EVERY 8 HOURS PRN
Status: DISCONTINUED | OUTPATIENT
Start: 2023-11-09 | End: 2023-11-17

## 2023-11-09 RX ORDER — HALOPERIDOL 5 MG/ML
INJECTION INTRAMUSCULAR
Status: COMPLETED
Start: 2023-11-09 | End: 2023-11-09

## 2023-11-09 RX ORDER — HYDROCODONE BITARTRATE AND ACETAMINOPHEN 5; 325 MG/1; MG/1
1 TABLET ORAL EVERY 6 HOURS PRN
Status: DISCONTINUED | OUTPATIENT
Start: 2023-11-09 | End: 2023-11-17

## 2023-11-09 RX ORDER — ACETAMINOPHEN 500 MG
500 TABLET ORAL EVERY 4 HOURS PRN
Status: DISCONTINUED | OUTPATIENT
Start: 2023-11-09 | End: 2023-11-17

## 2023-11-09 RX ORDER — HEPARIN SODIUM 5000 [USP'U]/ML
5000 INJECTION, SOLUTION INTRAVENOUS; SUBCUTANEOUS EVERY 8 HOURS SCHEDULED
Status: DISCONTINUED | OUTPATIENT
Start: 2023-11-09 | End: 2023-11-17

## 2023-11-09 RX ORDER — MELATONIN
3 NIGHTLY PRN
Status: DISCONTINUED | OUTPATIENT
Start: 2023-11-09 | End: 2023-11-17

## 2023-11-09 RX ORDER — HALOPERIDOL 5 MG/ML
2 INJECTION INTRAMUSCULAR EVERY 6 HOURS PRN
Status: DISCONTINUED | OUTPATIENT
Start: 2023-11-09 | End: 2023-11-14

## 2023-11-09 RX ORDER — TRAMADOL HYDROCHLORIDE 50 MG/1
50 TABLET ORAL EVERY 6 HOURS PRN
Status: DISCONTINUED | OUTPATIENT
Start: 2023-11-09 | End: 2023-11-17

## 2023-11-09 RX ORDER — ONDANSETRON 2 MG/ML
4 INJECTION INTRAMUSCULAR; INTRAVENOUS EVERY 6 HOURS PRN
Status: DISCONTINUED | OUTPATIENT
Start: 2023-11-09 | End: 2023-11-17

## 2023-11-09 NOTE — PROGRESS NOTES
Assumed care at 7:30  Patient is alert and oriented x3  Brother, [employer], called and stated he is upset that services were not provided for home care of lymphedema  Case management made aware  Wound care came and wrapped legs  Pending PT/OT assessment  All safety precautions in place. Will continue to monitor patient.

## 2023-11-09 NOTE — PHYSICAL THERAPY NOTE
PHYSICAL THERAPY EVALUATION - INPATIENT     Room Number: 9131/9227-O  Evaluation Date: 11/9/2023  Type of Evaluation: Initial  Physician Order: PT Eval and Treat    Presenting Problem: Visual hallucinations  Co-Morbidities : lymphedema, HTN  Reason for Therapy: Mobility Dysfunction and Discharge Planning    History related to current admission: Patient is a 67year old female admitted on 11/8/2023 from home for visual hallucinations. Previous Admissions:   10/22-10/27/2023: cellulitis; rec OP lymphedema PT  5/26-5/31/23: lymphedema;    ASSESSMENT   In this PT evaluation, the patient presents with the following impairments decreased strength, functional mobility, trunk control, endurance, balance. These impairments and comorbidities manifest themselves as functional limitations in independent bed mobility, transfers, and gait. The patient is below baseline and would benefit from skilled inpatient PT to address the above deficits to assist patient in returning to prior to level of function. Functional outcome measures completed include AMPA. The AM-PAC '6-Clicks' Inpatient Basic Mobility Short Form was completed and this patient is demonstrating a Approx Degree of Impairment: 50.57%  degree of impairment in mobility. Research supports that patients with this level of impairment may benefit from [address] . DISCHARGE RECOMMENDATIONS  PT Discharge Recommendations: Home with home health PT;24 hour care/supervision    PLAN  PT Treatment Plan: Bed mobility; Body mechanics; Endurance; Energy conservation;Patient education; Family education;Gait training;Balance training;Transfer training;Strengthening  Rehab Potential : Fair  Frequency (Obs):  (2-3x/week)  Number of Visits to Meet Established Goals: 4      CURRENT GOALS    Goal #1 Patient is able to demonstrate supine - sit EOB @ level: supervision     Goal #2 Patient is able to demonstrate transfers EOB to/from Chair/Wheelchair at assistance level: supervision Goal #3 Patient is able to ambulate 50 feet with assist device: walker - rolling at assistance level: supervision     Goal #4    Goal #5    Goal #6    Goal Comments: Goals established on 11/9/2023    HOME SITUATION  Type of Home: [address] [place] [address]: One level                Lives With:  (Brother)  Drives: No  Patient Owned Equipment: Rolling walker       Prior Level of [place]: Per chart review and pt report, pt typically ambulates with RW household distances, brother assists with IADLs such as laundry and groceries. Has recently been helping her get in/out of bed. Has been sleeping in lounge chair recently as well since it has been hard getting in/out of bed. SUBJECTIVE  \"The people over on the couch\"      OBJECTIVE  Precautions: Bed/chair alarm  Fall Risk: High fall risk    WEIGHT BEARING RESTRICTION  Weight Bearing Restriction: None                PAIN ASSESSMENT             COGNITION  Overall Cognitive Status:  Impaired  Problem Solving:  assistance required to identify errors made, assistance required to generate solutions, and assistance required to implement solutions    RANGE OF MOTION AND STRENGTH ASSESSMENT  See OT note for UE assessment    Lower extremity ROM is overall WFL, however limited due to lymphedema    Lower extremity strength is grossly 3+/5      BALANCE  Static Sitting: Fair  Dynamic Sitting: Fair -  Static Standing: Poor +  Dynamic Standing: Poor +    ADDITIONAL TESTS                                    ACTIVITY TOLERANCE                         O2 WALK       NEUROLOGICAL FINDINGS                        AM-PAC '6-Clicks' INPATIENT SHORT FORM - BASIC MOBILITY  How much difficulty does the patient currently have. ..   Patient Difficulty: Turning over in bed (including adjusting bedclothes, sheets and blankets)?: A Little   Patient Difficulty: Sitting down on and standing up from a chair with arms (e.g., wheelchair, bedside commode, etc.): A Little   Patient Difficulty: Moving from lying on back to sitting on the side of the bed?: A Little   How much help from another person does the patient currently need. .. Help from Another: Moving to and from a bed to a chair (including a wheelchair)?: A Little   Help from Another: Need to walk in hospital room?: A Little   Help from Another: Climbing 3-5 steps with a railing?: A Lot       AM-PAC Score:  Raw Score: 17   Approx Degree of Impairment: 50.57%   Standardized Score (AM-PAC Scale): 42.13   CMS Modifier (G-Code): CK    FUNCTIONAL ABILITY STATUS  Gait Assessment   Functional Mobility/Gait Assessment  Gait Assistance: Contact guard assist  Distance (ft): 15  Assistive Device: Rolling walker  Pattern:  (short step/stride length, inc step width due to lymphedema)    Skilled Therapy Provided     Gait Training:   Pt cued on upright standing posture to improve alignment with UEs  Pt cued on gait sequencing with RW - pt with wider HAZEL and step width due to lymphedema  Pt cued on proper UE placement on RW handles    Therapeutic Activity:   Pt cued on placement of UE and LEs for optimal force generation and safe STS transfer. Pt cued on usage of arm rests for controlled descent into sitting  Assisted with brief change in static standing         Bed Mobility:  Rolling: NT  Supine to sit: supervision HOB elevated  Sit to supine: modA for LE elevation     Transfer Mobility:  Sit to stand: CGA   Stand to sit: CGA  Gait = CGA    Therapist's Comments: RN cleared for session. Pt agreeable for therapy, received supine. Instructed to call for nursing staff for any needs and OOB mobility. Exercise/Education Provided:  Bed mobility  Body mechanics  Energy conservation  Functional activity tolerated  Gait training  Posture  Strengthening  Transfer training    Patient End of Session: In bed;Needs met;Call light within reach;RN aware of session/findings; All patient questions and concerns addressed; Alarm set      Patient Evaluation Complexity Level:  History Moderate - 1 or 2 personal factors and/or co-morbidities   Examination of body systems Moderate - addressing a total of 3 or more elements   Clinical Presentation Moderate - Evolving   Clinical Decision Making Moderate - Evolving       PT Session Time: 35 minutes  Gait Trainin minutes  Therapeutic Activity: 4 minutes

## 2023-11-09 NOTE — PLAN OF CARE
NURSING ADMISSION NOTE      Patient admitted via cart from ER  Pt lives with brother. AOX4, no halllucination  Denies pain. Chronic lymphedema, redness on BLE, with some blisters  Per patient, she walks at home with walker. Vital signs stable  Oriented to room. Safety precautions initiated. Bed in low position. Call light in reach. Problem: SAFETY ADULT - FALL  Goal: Free from fall injury  Description: INTERVENTIONS:  - Assess pt frequently for physical needs  - Identify cognitive and physical deficits and behaviors that affect risk of falls.   - [place] fall precautions as indicated by assessment.  - Educate pt/family on patient safety including physical limitations  - Instruct pt to call for assistance with activity based on assessment  - Modify environment to reduce risk of injury  - Provide assistive devices as appropriate  - Consider OT/PT consult to assist with strengthening/mobility  - Encourage toileting schedule  Outcome: Progressing     Problem: METABOLIC/FLUID AND ELECTROLYTES - ADULT  Goal: Hemodynamic stability and optimal renal function maintained  Description: INTERVENTIONS:  - Monitor labs and assess for signs and symptoms of volume excess or deficit  - Monitor intake, output and patient weight  - Monitor urine specific gravity, serum osmolarity and serum sodium as indicated or ordered  - Monitor response to interventions for patient's volume status, including labs, urine output, blood pressure (other measures as available)  - Encourage oral intake as appropriate  - Instruct patient on fluid and nutrition restrictions as appropriate  Outcome: Progressing     Problem: SKIN/TISSUE INTEGRITY - ADULT  Goal: Incision(s), wounds(s) or drain site(s) healing without S/S of infection  Description: INTERVENTIONS:  - Assess and document risk factors for pressure ulcer development  - Assess and document skin integrity  - Assess and document dressing/incision, wound bed, drain sites and surrounding tissue  - Implement wound care per orders  - Initiate isolation precautions as appropriate  - Initiate Pressure Ulcer prevention bundle as indicated  Outcome: Progressing     Problem: MUSCULOSKELETAL - ADULT  Goal: Return mobility to safest level of function  Description: INTERVENTIONS:  - Assess patient stability and activity tolerance for standing, transferring and ambulating w/ or w/o assistive devices  - Assist with transfers and ambulation using safe patient handling equipment as needed  - Ensure adequate protection for wounds/incisions during mobilization  - Obtain PT/OT consults as needed  - Advance activity as appropriate  - Communicate ordered activity level and limitations with patient/family  Outcome: Progressing     Problem: PAIN - ADULT  Goal: Verbalizes/displays adequate comfort level or patient's stated pain goal  Description: INTERVENTIONS:  - Encourage pt to monitor pain and request assistance  - Assess pain using appropriate pain scale  - Administer analgesics based on type and severity of pain and evaluate response  - Implement non-pharmacological measures as appropriate and evaluate response  - Consider cultural and social influences on pain and pain management  - Manage/alleviate anxiety  - Utilize distraction and/or relaxation techniques  - Monitor for opioid side effects  - Notify MD/LIP if interventions unsuccessful or patient reports new pain  - Anticipate increased pain with activity and pre-medicate as appropriate  Outcome: Progressing     Problem: Patient/Family Goals  Goal: Patient/Family Long Term Goal  Description: Patient's Long Term Goal:discharge home/ recommended facility    Interventions:  -  PT/OT  -Antibiotic  - See additional Care Plan goals for specific interventions  Outcome: Progressing  Goal: Patient/Family Short Term Goal  Description: Patient's Short Term Goal: remains  oriented/neuro intact    Interventions:   - orientation  -Meds  -antibiotic  - See additional Care Plan goals for specific interventions  Outcome: Progressing

## 2023-11-09 NOTE — CONSULTS
[facility]  Report of Inpatient Wound Care Consultation    Behzad Mueller Patient Status:  Inpatient    1950 MRN [id number]   [facility] 3NE-A Attending Nora Lopes MD   Hosp Day # 0 PCP Tiburcio Acosta     Reason for Consultation:  lymphedema    History of Present Illness:  Behzad Mueller is a a(n) 67year old female. Patient presents with bilateral lower extremity wounds, with hx of lymphedema. Denies pain in the wounds at this time but has difficulty with lifting and repositioning the lower legs. RN in the room. History:  Past Medical History:   Diagnosis Date    Cataract     right eye completed; future plan for left eye procceure    Disorder of thyroid 2023    uses synthroid    Essential hypertension     High blood pressure     High cholesterol     Hx of pancreatitis     6 times    Hyperlipidemia     Lymphedema of both lower extremities     longstanding, unknown origin    Muscle weakness     Visual impairment      Past Surgical History:   Procedure Laterality Date    CATARACT      CHOLECYSTECTOMY        reports that she has never smoked. She has never used smokeless tobacco. She reports that she does not currently use alcohol. She reports that she does not currently use drugs.     Allergies:  @ALLERGY    Laboratory Data:  Lab Results   Component Value Date    WBC 7.5 2023    HGB 11.4 2023    HCT 32.7 2023    .0 2023    CREATSERUM 1.79 2023    BUN 52 2023     2023    K 3.7 2023     2023    CO2 24.0 2023    GLU 90 2023    CA 9.0 2023    ALB 3.4 2023    ALKPHO 83 2023    BILT 1.4 2023    TP 7.6 2023    AST 47 2023    ALT 22 2023    T4F 1.5 2023    TSH 4.980 2023         Impression:  Wound 23 Tibial Left (Active)   Date First Assessed/Time First Assessed: 23 1355   Location: Tibial  Wound Location Orientation: Left Assessments 11/9/2023 11:10 AM   Wound Image       Drainage Amount Small   Drainage Description Serosanguineous   Wound Length (cm) 7.5 cm   Wound Width (cm) 6.5 cm   Wound Surface Area (cm^2) 48.75 cm^2   Wound Depth (cm) 0.1 cm   Wound Volume (cm^3) 4.875 cm^3   Margins Well-defined edges   Non-staged Wound Description Full thickness   Janae-wound Assessment Edema; Other (Comment) (erythema,pigmentation)   Wound Granulation Tissue Pink;Red;Spongy   Wound Bed Granulation (%) 70 %   Wound Bed Epithelium (%) 20 %   Wound Bed Slough (%) 10 %   Wound Odor None   Shape Wounds measured in cluster       Wound 05/30/23 Tibial Right (Active)   Date First Assessed/Time First Assessed: 05/30/23 1356   Location: Tibial  Wound Location Orientation: Right      Assessments 11/9/2023 11:12 AM   Wound Image      Drainage Amount Scant   Drainage Description Serosanguineous   Wound Length (cm) 0.8 cm   Wound Width (cm) 0.9 cm   Wound Surface Area (cm^2) 0.72 cm^2   Wound Depth (cm) 0.1 cm   Wound Volume (cm^3) 0.072 cm^3   Margins Well-defined edges   Non-staged Wound Description Full thickness   Janae-wound Assessment Pink; Other (Comment);Edema (pigmentation)   Wound Granulation Tissue Pink;Pale Esparza   Wound Bed Granulation (%) 100 %   Wound Odor None   Additional notes : Noted intact fluid filled blister on the anterior aspect of the leg. No drainage. Measures 2 cm x 1.8 cm x 0 depth. Wound Cleaning and Dressings:  BLE  Showering directions: May shower with protection- use cast cover or a shower boot  Wound cleansing:  Cleanse with saline  Wound product: Xeroform gauze. Cover with bordered foam  Dressing change frequency:  Change dressing every other day and/or as needed        Compression Therapy:   Tubigrip and Elevate leg(s) as much as possible- apply tubigrip G at the base of the toes to below the knee area  See flow sheet on edema for lower extremity assessment      Miscellaneous/Additional Orders:  Offloading: Turn Q 2 hours and as needed, as tolerated      Care Summary: Discussed Plan of Care at beside with patient. Patient verbally acknowledges understanding of all instructions and all questions were answered. Recommendations: Follow up with lymphedema clinic or home health /lymphedema therapist if discharged to home. Per RN , patient will be seen by PT/OT at a later time, OT can evaluate the lymphedema if possible     Thank you for allowing me to participate in the care of your patient. Time Spent 30 minutes  Thank you.     Presley Sanchez RN, BSN [facility]   Wound Care pager [phone]  11/9/2023  11:33 AM

## 2023-11-09 NOTE — ED INITIAL ASSESSMENT (HPI)
Pt to ED via EMs from home for visual hallucinations. Brother called EMS because he's concerned for pt. Pt had this happen about 2 weeks ago. Denies SI/HI. Hx lymphedema.

## 2023-11-09 NOTE — ED QUICK NOTES
Orders for admission, patient is aware of plan and ready to go upstairs. Any questions, please call ED RN Armani at extension [phone].      Patient Covid vaccination status: Fully vaccinated     COVID Test Ordered in ED: None    COVID Suspicion at Admission: N/A    Running Infusions:  None    Mental Status/LOC at time of transport: a&ox3    Other pertinent information:   CIWA score: N/A   NIH score:  N/A

## 2023-11-09 NOTE — CM/SW NOTE
11/09/23 1700   CM/SW Referral Data   Referral Source   ([id number])   Reason for Referral Discharge planning   Informant Patient;Sibling;EMR;Clinical Staff Member   Medical Hx   Does patient have an established PCP? Yes   Patient Info   Patient's Current Mental Status at Time of Assessment Alert   Patient's Home Environment Condo/Apt with elevator   Patient lives with Sibling   Patient Status Prior to Admission   Independent with ADLs and Mobility No   Discharge Needs   Anticipated D/C needs Subacute rehab     Pt is a 66 y/o female admitted with UTI and hallucinations. Case discussed with RN who stated pt's brother was upset [facility] has not been out to see pt since her discharge two weeks ago. Chart reviewed, pt recently admitted 10/22-10/27 and per SW notes, pt declined [facility] and preferred OP Lymphedema clinic. DUNCAN spoke with pt's brother via phone regarding discharge plan. Pt lives with her brother in a condo and he is her primary caregiver. Pt's brother stated he has lived with pt for over 10 years. Pt's brother stated pt has been sleeping in the recliner and there is always someone at home with her. Pt's brother stated pt has declined in the last few months and has been hallucinating and having inability to focus. Pt's brother stated pt uses a walker to ambulate but she has arthritis pain that is limiting her mobility. Pt's brother stated he has tried to move pt into an [facility] but she is not agreeable. DUNCAN discussed [address] with pt's brother. Pt's brother stated he is agreeable with [facility] but stated pt will need BRANDYN. DUNCAN informed pt's brother pt was made inpatient today 11/9 and it is unknown if pt will meet three medically necessary inpatient midnights for BRANDYN coverage. DUNCAN informed pt's brother if pt does not meet criteria for BRANDYN he has the option to pay privately. Pt's brother inquired how this writer is able to tell pt will not be in the hospital for three days.  DUNCAN informed pt's brother of Medicare requirements for BRANDYN and stated it is uncertain if pt will meet criteria for BRANDYN length of stay is determined by clinical course. Pt's brother expressed his frustration and stated 'you keep talking in circles.' Pt's brother requested SW meet with pt to inform her of Medicare criteria for BRANDYN. Pt's brother stated pt was released from [facility] two weeks ago and she has continued to decline and hallucinating. SW reviewed pt's chart and pt was admitted 10/22-10/27 and switched to inpatient on 10/24. Pt might have qualifying stay for BRANDYN and is in her 30 day window. DUNCAN met with pt at bedside and inquired why pt refused [address] last admission. Pt stated she had [address] for a while, but two months ago she had bugs and the [address] stopped. SW clarified with pt that she had beg bugs. Pt stated she had bed bugs two months ago and has since received two treatments from [place]. Pt stated [address] came back after the treatment but then services stopped. Pt stated she had the OP Lymphedema clinic and has [facility]ist program available two days a week, but pt has not been able to go to Lymphedema clinic. Pt stated she is uncertain if she would want to have [address] again. SW informed pt of her brother's request for BRANDYN. Pt stated she is agreeable to BRANDYN. Pt triggered on [facility] for transportation. Ride Assist, [facility] clinic provided to pt and transportation resources placed on AVS.     DUNCAN sent referral for BRANDYN in [address]. DUNCAN sent [address] referral in [address]. BANDAR submitted and queued for review. DUNCAN will continue to follow.      Simeon Model, [facility] [place]  Discharge Planner

## 2023-11-09 NOTE — PROGRESS NOTES
[facility] Pharmacy Note: Antimicrobial Weight Based Dose Adjustment for: ceftriaxone (ROCEPHIN)    Munir Bazzi is a 67year old patient who has been prescribed ceftriaxone (ROCEPHIN) 1000 mg once. Estimated Creatinine Clearance: 19.3 mL/min (A) (based on SCr of 2.18 mg/dL (H)). Wt Readings from Last 6 Encounters:   10/22/23 113 kg (249 lb 1.9 oz)   09/19/23 113.4 kg (250 lb)   05/31/23 110.5 kg (243 lb 9.6 oz)   02/11/23 125.2 kg (276 lb)   07/07/22 111.7 kg (246 lb 3.2 oz)   06/28/22 113.5 kg (250 lb 3.2 oz)     There is no height or weight on file to calculate BMI. Based on this criteria and renal function, dose will be adjusted to ceftriaxone (ROCEPHIN) 2000 mg once.     Thank you,    Hesham Rain, PharmD  11/8/2023  9:35 PM

## 2023-11-10 PROCEDURE — 99232 SBSQ HOSP IP/OBS MODERATE 35: CPT | Performed by: HOSPITALIST

## 2023-11-10 RX ORDER — HYDRALAZINE HYDROCHLORIDE 20 MG/ML
10 INJECTION INTRAMUSCULAR; INTRAVENOUS ONCE
Status: DISCONTINUED | OUTPATIENT
Start: 2023-11-11 | End: 2023-11-17

## 2023-11-10 NOTE — PLAN OF CARE
Assumed care at [address]. Pt. A&O x1, having both visual and auditory hallucinations, verbally aggressive as soon as this RN entered the room saying vague statements like, \"You know what you did\" \"It's too late to fix anything\". When asked to clarify she was unable to. When attempting to assess orientation pt. Stated \"Oh not this again. I'm not answering, (explicative) anything\". On RA, NSR on tele. VSS. No c/o pain. Upon assessment IV was infiltrated. Removed. Pt. Agitated refusing all medications, safety interventions, and attempts to restart IV. Pt.'s brother called with intent to de-escalate the patient. Pt. Remained confused, verbally aggressive to staff, and having active hallucinations. Brother notified of possible interventions and is agreeable to such interventions. MD paged. Haldol IM given x1 and restraints applied per MD order. Pt. Was able to get out of restraints, code support called to assist pt. Back in to a safe position in bed. IM Zyprexa ordered and given x1. Pt. Remained tearful, confused, and hallucinating. Pt. IV was restarted via ultrasound. 0.9 infusing at 83/hr. Visual hallucinations include: people, animals, and objects that are not there. Auditory hallucinations: having conversations with people that are not there. Isolation, fall, and safety precautions in place. Plan of care ongoing. 0600- Pt. Did not fall asleep until 0530. Pt. Brother updated at 3588. Unable to do bladder scan overnight d/t altered mental status and combativeness. Problem: SAFETY ADULT - FALL  Goal: Free from fall injury  Description: INTERVENTIONS:  - Assess pt frequently for physical needs  - Identify cognitive and physical deficits and behaviors that affect risk of falls.   - [place] fall precautions as indicated by assessment.  - Educate pt/family on patient safety including physical limitations  - Instruct pt to call for assistance with activity based on assessment  - Modify environment to reduce risk of injury  - Provide assistive devices as appropriate  - Consider OT/PT consult to assist with strengthening/mobility  - Encourage toileting schedule  Outcome: Progressing     Problem: METABOLIC/FLUID AND ELECTROLYTES - ADULT  Goal: Hemodynamic stability and optimal renal function maintained  Description: INTERVENTIONS:  - Monitor labs and assess for signs and symptoms of volume excess or deficit  - Monitor intake, output and patient weight  - Monitor urine specific gravity, serum osmolarity and serum sodium as indicated or ordered  - Monitor response to interventions for patient's volume status, including labs, urine output, blood pressure (other measures as available)  - Encourage oral intake as appropriate  - Instruct patient on fluid and nutrition restrictions as appropriate  Outcome: Progressing     Problem: SKIN/TISSUE INTEGRITY - ADULT  Goal: Incision(s), wounds(s) or drain site(s) healing without S/S of infection  Description: INTERVENTIONS:  - Assess and document risk factors for pressure ulcer development  - Assess and document skin integrity  - Assess and document dressing/incision, wound bed, drain sites and surrounding tissue  - Implement wound care per orders  - Initiate isolation precautions as appropriate  - Initiate Pressure Ulcer prevention bundle as indicated  Outcome: Progressing     Problem: MUSCULOSKELETAL - ADULT  Goal: Return mobility to safest level of function  Description: INTERVENTIONS:  - Assess patient stability and activity tolerance for standing, transferring and ambulating w/ or w/o assistive devices  - Assist with transfers and ambulation using safe patient handling equipment as needed  - Ensure adequate protection for wounds/incisions during mobilization  - Obtain PT/OT consults as needed  - Advance activity as appropriate  - Communicate ordered activity level and limitations with patient/family  Outcome: Progressing     Problem: PAIN - ADULT  Goal: Verbalizes/displays adequate comfort level or patient's stated pain goal  Description: INTERVENTIONS:  - Encourage pt to monitor pain and request assistance  - Assess pain using appropriate pain scale  - Administer analgesics based on type and severity of pain and evaluate response  - Implement non-pharmacological measures as appropriate and evaluate response  - Consider cultural and social influences on pain and pain management  - Manage/alleviate anxiety  - Utilize distraction and/or relaxation techniques  - Monitor for opioid side effects  - Notify MD/LIP if interventions unsuccessful or patient reports new pain  - Anticipate increased pain with activity and pre-medicate as appropriate  Outcome: Progressing     Problem: Patient/Family Goals  Goal: Patient/Family Long Term Goal  Description: Patient's Long Term Goal:discharge home/ recommended facility    Interventions:  -  PT/OT  -Antibiotic  - See additional Care Plan goals for specific interventions  Outcome: Progressing  Goal: Patient/Family Short Term Goal  Description: Patient's Short Term Goal: remains  oriented/neuro intact    Interventions:   - orientation  -Meds  -antibiotic  - See additional Care Plan goals for specific interventions  Outcome: Progressing     Problem: Safety Risk - Non-Violent Restraints  Goal: Patient will remain free from self-harm  Description: INTERVENTIONS:  - Apply the least restrictive restraint to prevent harm  - Notify patient and family of reasons restraints applied  - Assess for any contributing factors to confusion (electrolyte disturbances, delirium, medications)  - Discontinue any unnecessary medical devices as soon as possible  - Assess the patient's physical comfort, circulation, skin condition, hydration, nutrition and elimination needs   - Reorient and redirection as needed  - Assess for the need to continue restraints  Outcome: Progressing     Problem: Delirium  Goal: Minimize duration of delirium  Description: Interventions:  - Encourage use of hearing aids, eye glasses  - Promote highest level of mobility daily  - Provide frequent reorientation  - Promote wakefulness i.e. lights on, blinds open  - Promote sleep, encourage patient's normal rest cycle i.e. lights off, TV off, minimize noise and interruptions  - Encourage family to assist in orientation and promotion of home routines  Outcome: Progressing     Problem: CONFUSION  Goal: Confusion, delirium, dementia or psychosis is improved or at baseline  Description: INTERVENTIONS:  - Assess for possible contributors to thought disturbance, including medications, impaired vision or hearing, underlying metabolic abnormalities, dehydration, psychiatric diagnoses, and notify attending MD/LIP  - [place] high risk fall precautions, as indicated  - Provide frequent short contacts to provide reality reorientation, refocusing and direction  - Decrease environmental stimuli, including noise as appropriate  - Monitor and intervene to maintain adequate nutrition, hydration, elimination, sleep and activity  - Consider the need for a sitter if unable to leave patient unattended  - [address] consult as indicated  - Consult Pharmacy as needed  Outcome: Progressing

## 2023-11-10 NOTE — CDS QUERY
Nia Thao  Dear Dr Caterina Medellin,   Clinical information (provided below) indicates an unknown status of Congestive Heart Failure (CHF) in the medical record. Based on your clinical judgement,                                  PLEASE SPECIFY THE DIAGNOSIS THAT APPLIES:    [  x ] Chronic Diastolic Heart Failure    [   ] Other - please specify:    [   ] Clinically unable to determine - please comment:  ___________________________________________________________________________  Documentation from the Medical Record:     RISK FACTORS: HTN, morbid obesity    CLINICAL INDICATORS:   -ED 77-year-old female with history of hypertension, lymphedema, CHF presents to the emergency room with brother, mother states that patient has been having hallucinations for the last few days, he states that she has been seeing things that are not there. Patient reportedly had similar episode 2 weeks ago according to brother. Patient denies suicidal or homicidal ideations. Denies any visual or auditory hallucinations at this time. Patient denies headache or neck pain denies chest pain or shortness of breath denies abdominal pain. Denies nausea vomiting or diarrhea. Denies any fevers or chills. Denies any focal weakness. Exam: neck is supple, there is no nuchal rigidity. Bilateral lower extremity lymphedema. LUNGS: Clear to auscultation bilaterally. no rales, no rhonchi, no wheezing, no stridor. Clinical impression: #Urinary tract infection without hematuria #Altered mental status #ROSMERY (acute kidney injury) #Cellulitis of lower extremity, unspecified laterality    -CXR- Cardiomegaly with mild interstitial opacities likely representing mild edema.    -historical echo 4/07- Systolic function was vigorous. The estimated ejection fraction was 65-70%. No diagnostic evidence for regional wall motion abnormalities. Left ventricular diastolic function parameters were normal for the patient's age.       TREATMENT: cardiac monitoring, I/Os, gentle IVFs          For questions regarding this query, please contact Clinical : Tina Kirby [phone] [address]

## 2023-11-10 NOTE — PLAN OF CARE
Assumed care at 0730. Pt sleeping, PRN haldol and zyprexa needed last night. Patient on room air. SB/NSR on tele. Soft wrist restraints and posey vest on for safety, pt was attempting to get out of bed, and was aggressive. PRNs available for agitation. Heparin for VTE. Purewick in place. Restraints checked Q2 hours  Will continue to monitor. Problem: SAFETY ADULT - FALL  Goal: Free from fall injury  Description: INTERVENTIONS:  - Assess pt frequently for physical needs  - Identify cognitive and physical deficits and behaviors that affect risk of falls.   - [place] fall precautions as indicated by assessment.  - Educate pt/family on patient safety including physical limitations  - Instruct pt to call for assistance with activity based on assessment  - Modify environment to reduce risk of injury  - Provide assistive devices as appropriate  - Consider OT/PT consult to assist with strengthening/mobility  - Encourage toileting schedule  Outcome: Progressing     Problem: METABOLIC/FLUID AND ELECTROLYTES - ADULT  Goal: Hemodynamic stability and optimal renal function maintained  Description: INTERVENTIONS:  - Monitor labs and assess for signs and symptoms of volume excess or deficit  - Monitor intake, output and patient weight  - Monitor urine specific gravity, serum osmolarity and serum sodium as indicated or ordered  - Monitor response to interventions for patient's volume status, including labs, urine output, blood pressure (other measures as available)  - Encourage oral intake as appropriate  - Instruct patient on fluid and nutrition restrictions as appropriate  Outcome: Progressing     Problem: SKIN/TISSUE INTEGRITY - ADULT  Goal: Incision(s), wounds(s) or drain site(s) healing without S/S of infection  Description: INTERVENTIONS:  - Assess and document risk factors for pressure ulcer development  - Assess and document skin integrity  - Assess and document dressing/incision, wound bed, drain sites and surrounding tissue  - Implement wound care per orders  - Initiate isolation precautions as appropriate  - Initiate Pressure Ulcer prevention bundle as indicated  Outcome: Progressing     Problem: MUSCULOSKELETAL - ADULT  Goal: Return mobility to safest level of function  Description: INTERVENTIONS:  - Assess patient stability and activity tolerance for standing, transferring and ambulating w/ or w/o assistive devices  - Assist with transfers and ambulation using safe patient handling equipment as needed  - Ensure adequate protection for wounds/incisions during mobilization  - Obtain PT/OT consults as needed  - Advance activity as appropriate  - Communicate ordered activity level and limitations with patient/family  Outcome: Progressing     Problem: PAIN - ADULT  Goal: Verbalizes/displays adequate comfort level or patient's stated pain goal  Description: INTERVENTIONS:  - Encourage pt to monitor pain and request assistance  - Assess pain using appropriate pain scale  - Administer analgesics based on type and severity of pain and evaluate response  - Implement non-pharmacological measures as appropriate and evaluate response  - Consider cultural and social influences on pain and pain management  - Manage/alleviate anxiety  - Utilize distraction and/or relaxation techniques  - Monitor for opioid side effects  - Notify MD/LIP if interventions unsuccessful or patient reports new pain  - Anticipate increased pain with activity and pre-medicate as appropriate  Outcome: Progressing

## 2023-11-11 LAB
ANION GAP SERPL CALC-SCNC: 6 MMOL/L (ref 0–18)
BASOPHILS # BLD AUTO: 0.01 X10(3) UL (ref 0–0.2)
BASOPHILS NFR BLD AUTO: 0.2 %
BUN BLD-MCNC: 32 MG/DL (ref 9–23)
CALCIUM BLD-MCNC: 8.4 MG/DL (ref 8.5–10.1)
CHLORIDE SERPL-SCNC: 112 MMOL/L (ref 98–112)
CO2 SERPL-SCNC: 26 MMOL/L (ref 21–32)
CREAT BLD-MCNC: 0.96 MG/DL
EGFRCR SERPLBLD CKD-EPI 2021: 63 ML/MIN/1.73M2 (ref 60–?)
EOSINOPHIL # BLD AUTO: 0.18 X10(3) UL (ref 0–0.7)
EOSINOPHIL NFR BLD AUTO: 3.2 %
ERYTHROCYTE [DISTWIDTH] IN BLOOD BY AUTOMATED COUNT: 12.7 %
GLUCOSE BLD-MCNC: 85 MG/DL (ref 70–99)
HCT VFR BLD AUTO: 31.9 %
HGB BLD-MCNC: 11.2 G/DL
IMM GRANULOCYTES # BLD AUTO: 0.02 X10(3) UL (ref 0–1)
IMM GRANULOCYTES NFR BLD: 0.4 %
LYMPHOCYTES # BLD AUTO: 0.75 X10(3) UL (ref 1–4)
LYMPHOCYTES NFR BLD AUTO: 13.4 %
MCH RBC QN AUTO: 33.8 PG (ref 26–34)
MCHC RBC AUTO-ENTMCNC: 35.1 G/DL (ref 31–37)
MCV RBC AUTO: 96.4 FL
MONOCYTES # BLD AUTO: 0.6 X10(3) UL (ref 0.1–1)
MONOCYTES NFR BLD AUTO: 10.7 %
NEUTROPHILS # BLD AUTO: 4.04 X10 (3) UL (ref 1.5–7.7)
NEUTROPHILS # BLD AUTO: 4.04 X10(3) UL (ref 1.5–7.7)
NEUTROPHILS NFR BLD AUTO: 72.1 %
OSMOLALITY SERPL CALC.SUM OF ELEC: 304 MOSM/KG (ref 275–295)
PLATELET # BLD AUTO: 137 10(3)UL (ref 150–450)
POTASSIUM SERPL-SCNC: 3.6 MMOL/L (ref 3.5–5.1)
RBC # BLD AUTO: 3.31 X10(6)UL
SODIUM SERPL-SCNC: 144 MMOL/L (ref 136–145)
WBC # BLD AUTO: 5.6 X10(3) UL (ref 4–11)

## 2023-11-11 PROCEDURE — 99232 SBSQ HOSP IP/OBS MODERATE 35: CPT | Performed by: HOSPITALIST

## 2023-11-11 NOTE — PLAN OF CARE
Assumed care at [address] Sleeping due to PRN haldol given during day  RA  SB on tele  Metoprolol held  NS infusing at 83ml/hr  Soft wrist/posey vest restraints renewed   Restraints checked Q2   Purewick and brief in place  Safety precautions in place  All needs met at this time  Continue current plan of care      Problem: SAFETY ADULT - FALL  Goal: Free from fall injury  Description: INTERVENTIONS:  - Assess pt frequently for physical needs  - Identify cognitive and physical deficits and behaviors that affect risk of falls.   - [place] fall precautions as indicated by assessment.  - Educate pt/family on patient safety including physical limitations  - Instruct pt to call for assistance with activity based on assessment  - Modify environment to reduce risk of injury  - Provide assistive devices as appropriate  - Consider OT/PT consult to assist with strengthening/mobility  - Encourage toileting schedule  Outcome: Progressing

## 2023-11-11 NOTE — PLAN OF CARE
Assumed care at 0730. Patient is awake, alert, oriented x4. Room air. Denies pain. SB/NSR on tele. Denies pain. Purewick in place. Cardiac diet. NS @83ml/hr. Lyphedema to BLE, dressings were changed early morning today. Scheduled to change every other day. Will continue to monitor. Problem: SAFETY ADULT - FALL  Goal: Free from fall injury  Description: INTERVENTIONS:  - Assess pt frequently for physical needs  - Identify cognitive and physical deficits and behaviors that affect risk of falls.   - [place] fall precautions as indicated by assessment.  - Educate pt/family on patient safety including physical limitations  - Instruct pt to call for assistance with activity based on assessment  - Modify environment to reduce risk of injury  - Provide assistive devices as appropriate  - Consider OT/PT consult to assist with strengthening/mobility  - Encourage toileting schedule  Outcome: Progressing     Problem: METABOLIC/FLUID AND ELECTROLYTES - ADULT  Goal: Hemodynamic stability and optimal renal function maintained  Description: INTERVENTIONS:  - Monitor labs and assess for signs and symptoms of volume excess or deficit  - Monitor intake, output and patient weight  - Monitor urine specific gravity, serum osmolarity and serum sodium as indicated or ordered  - Monitor response to interventions for patient's volume status, including labs, urine output, blood pressure (other measures as available)  - Encourage oral intake as appropriate  - Instruct patient on fluid and nutrition restrictions as appropriate  Outcome: Progressing     Problem: SKIN/TISSUE INTEGRITY - ADULT  Goal: Incision(s), wounds(s) or drain site(s) healing without S/S of infection  Description: INTERVENTIONS:  - Assess and document risk factors for pressure ulcer development  - Assess and document skin integrity  - Assess and document dressing/incision, wound bed, drain sites and surrounding tissue  - Implement wound care per orders  - Initiate isolation precautions as appropriate  - Initiate Pressure Ulcer prevention bundle as indicated  Outcome: Progressing     Problem: MUSCULOSKELETAL - ADULT  Goal: Return mobility to safest level of function  Description: INTERVENTIONS:  - Assess patient stability and activity tolerance for standing, transferring and ambulating w/ or w/o assistive devices  - Assist with transfers and ambulation using safe patient handling equipment as needed  - Ensure adequate protection for wounds/incisions during mobilization  - Obtain PT/OT consults as needed  - Advance activity as appropriate  - Communicate ordered activity level and limitations with patient/family  Outcome: Progressing     Problem: PAIN - ADULT  Goal: Verbalizes/displays adequate comfort level or patient's stated pain goal  Description: INTERVENTIONS:  - Encourage pt to monitor pain and request assistance  - Assess pain using appropriate pain scale  - Administer analgesics based on type and severity of pain and evaluate response  - Implement non-pharmacological measures as appropriate and evaluate response  - Consider cultural and social influences on pain and pain management  - Manage/alleviate anxiety  - Utilize distraction and/or relaxation techniques  - Monitor for opioid side effects  - Notify MD/LIP if interventions unsuccessful or patient reports new pain  - Anticipate increased pain with activity and pre-medicate as appropriate  Outcome: Progressing

## 2023-11-11 NOTE — CM/SW NOTE
Completed PASRR screening attached to BRANDYN referral in [facility] system.      Veronica Streeter RN Case Manager [id number]

## 2023-11-12 PROCEDURE — 99232 SBSQ HOSP IP/OBS MODERATE 35: CPT | Performed by: HOSPITALIST

## 2023-11-12 NOTE — PLAN OF CARE
Assumed Patient care at 0730. A/Ox4. Room air. NSR on tele. Cardiac diet. Electrolyte replacement protocol (cardiac). Heparin vte. Given Rocephin per MAR. [place]co given for pain per MAR. BLE lymphedema/erythema. Dressing changed. Purewick. All safety precautions are in place and will continue to with plan of care. Problem: SAFETY ADULT - FALL  Goal: Free from fall injury  Description: INTERVENTIONS:  - Assess pt frequently for physical needs  - Identify cognitive and physical deficits and behaviors that affect risk of falls.   - [place] fall precautions as indicated by assessment.  - Educate pt/family on patient safety including physical limitations  - Instruct pt to call for assistance with activity based on assessment  - Modify environment to reduce risk of injury  - Provide assistive devices as appropriate  - Consider OT/PT consult to assist with strengthening/mobility  - Encourage toileting schedule  Outcome: Progressing     Problem: SKIN/TISSUE INTEGRITY - ADULT  Goal: Incision(s), wounds(s) or drain site(s) healing without S/S of infection  Description: INTERVENTIONS:  - Assess and document risk factors for pressure ulcer development  - Assess and document skin integrity  - Assess and document dressing/incision, wound bed, drain sites and surrounding tissue  - Implement wound care per orders  - Initiate isolation precautions as appropriate  - Initiate Pressure Ulcer prevention bundle as indicated  Outcome: Progressing     Problem: MUSCULOSKELETAL - ADULT  Goal: Return mobility to safest level of function  Description: INTERVENTIONS:  - Assess patient stability and activity tolerance for standing, transferring and ambulating w/ or w/o assistive devices  - Assist with transfers and ambulation using safe patient handling equipment as needed  - Ensure adequate protection for wounds/incisions during mobilization  - Obtain PT/OT consults as needed  - Advance activity as appropriate  - Communicate ordered activity level and limitations with patient/family  Outcome: Progressing

## 2023-11-12 NOTE — PLAN OF CARE
Assumed care at [address]. A&ox4  Rm air  SB on tele  Held metoprolol  Purewick in place  Denies pain  ID and wound care consulted  IV ABX daily  NS infusing at 83ml/hr  Safety precautions in place  All needs met at this time  Continue current plan of care      Problem: SAFETY ADULT - FALL  Goal: Free from fall injury  Description: INTERVENTIONS:  - Assess pt frequently for physical needs  - Identify cognitive and physical deficits and behaviors that affect risk of falls.   - [place] fall precautions as indicated by assessment.  - Educate pt/family on patient safety including physical limitations  - Instruct pt to call for assistance with activity based on assessment  - Modify environment to reduce risk of injury  - Provide assistive devices as appropriate  - Consider OT/PT consult to assist with strengthening/mobility  - Encourage toileting schedule  Outcome: Progressing

## 2023-11-13 PROBLEM — F29 PSYCHOSIS (HCC): Status: ACTIVE | Noted: 2023-11-13

## 2023-11-13 PROBLEM — G93.41 ACUTE METABOLIC ENCEPHALOPATHY: Status: ACTIVE | Noted: 2023-11-13

## 2023-11-13 PROCEDURE — 99232 SBSQ HOSP IP/OBS MODERATE 35: CPT | Performed by: HOSPITALIST

## 2023-11-13 PROCEDURE — 90792 PSYCH DIAG EVAL W/MED SRVCS: CPT

## 2023-11-13 RX ORDER — RISPERIDONE 0.25 MG/1
0.5 TABLET ORAL NIGHTLY
Status: DISCONTINUED | OUTPATIENT
Start: 2023-11-13 | End: 2023-11-14

## 2023-11-13 RX ORDER — TORSEMIDE 20 MG/1
40 TABLET ORAL DAILY
Status: SHIPPED | COMMUNITY
Start: 2023-11-13

## 2023-11-13 RX ORDER — RISPERIDONE 0.25 MG/1
0.5 TABLET ORAL NIGHTLY PRN
Status: DISCONTINUED | OUTPATIENT
Start: 2023-11-13 | End: 2023-11-14

## 2023-11-13 NOTE — PROGRESS NOTES
Patient seen and examined. Discharge if ok with consultants. Hospitalist portion of med rec completed.   Dispo per PT/OT/SW    Sofia Austin MD  [facility]  Internal Medicine Hospitalist

## 2023-11-13 NOTE — PLAN OF CARE
A/Ox2. Confused/ forgetful. Visual hallucinations noted. Pt reports seeing dogs and pt observed talking to people in the room that are not there. On RA. Tele-NSR.   +UTI, IV Rocephin Q24. Purewick in place. BLE lymphedema. Dressing change every other day, done last yesterday. IVF infusing. Ok to DC from ID standpoint. Discharge planning with SW.  Staff will cont to monitor. Problem: SAFETY ADULT - FALL  Goal: Free from fall injury  Description: INTERVENTIONS:  - Assess pt frequently for physical needs  - Identify cognitive and physical deficits and behaviors that affect risk of falls.   - [place] fall precautions as indicated by assessment.  - Educate pt/family on patient safety including physical limitations  - Instruct pt to call for assistance with activity based on assessment  - Modify environment to reduce risk of injury  - Provide assistive devices as appropriate  - Consider OT/PT consult to assist with strengthening/mobility  - Encourage toileting schedule  Outcome: Progressing     Problem: SKIN/TISSUE INTEGRITY - ADULT  Goal: Incision(s), wounds(s) or drain site(s) healing without S/S of infection  Description: INTERVENTIONS:  - Assess and document risk factors for pressure ulcer development  - Assess and document skin integrity  - Assess and document dressing/incision, wound bed, drain sites and surrounding tissue  - Implement wound care per orders  - Initiate isolation precautions as appropriate  - Initiate Pressure Ulcer prevention bundle as indicated  Outcome: Progressing     Problem: PAIN - ADULT  Goal: Verbalizes/displays adequate comfort level or patient's stated pain goal  Description: INTERVENTIONS:  - Encourage pt to monitor pain and request assistance  - Assess pain using appropriate pain scale  - Administer analgesics based on type and severity of pain and evaluate response  - Implement non-pharmacological measures as appropriate and evaluate response  - Consider cultural and social influences on pain and pain management  - Manage/alleviate anxiety  - Utilize distraction and/or relaxation techniques  - Monitor for opioid side effects  - Notify MD/LIP if interventions unsuccessful or patient reports new pain  - Anticipate increased pain with activity and pre-medicate as appropriate  Outcome: Progressing

## 2023-11-13 NOTE — PHYSICAL THERAPY NOTE
PT treatment att'd, pt refused, educated per EMR appears to be dc pending, however continued to refuse, secure messaged RN and attending. Addendum: RN re-in forced importance of continued mobility with pt,  requiring increased encouragement to participate but eventually agreeable. PHYSICAL THERAPY TREATMENT NOTE - INPATIENT    Room Number: 5075/8976-Y     Session: 1     Number of Visits to Meet Established Goals: 4    Presenting Problem: Visual hallucinations  Co-Morbidities : lymphedema BLE, recurrent cellulitis BLE, DVT LE, morbid obesity, HTN  ASSESSMENT     Pt able to perform all mobility with SBA - CGA and RW. Demonstrated improved mobility compared to previous session able to progress increased ambulation distances with RW - comparable to pts reported baseline and mobility demonstrated last admission. Increased time required for extensive encouragement for pt participation. Mild confusion noted - pt adamantly declining socks stating she was already wearing socks even though there were no socks on, repeatedly asking what was behind the door, and demonstrating inc agitation whenever therapist would step behind her for guarding purposes or line management. The patient is below baseline and would benefit from skilled inpatient PT to address the above deficits to assist patient in returning to prior to level of function. DISCHARGE RECOMMENDATIONS  PT Discharge Recommendations: Home with home health PT;24 hour care/supervision     PLAN  PT Treatment Plan: Bed mobility; Body mechanics; Endurance; Energy conservation;Patient education; Family education;Gait training;Balance training;Transfer training;Strengthening  Rehab Potential : Fair  Frequency (Obs):  (2-3x/week)    CURRENT GOALS     Goal #1 Patient is able to demonstrate supine - sit EOB @ level: supervision   MET 11/13   Goal #2 Patient is able to demonstrate transfers EOB to/from Chair/Wheelchair at assistance level: supervision   MET 11/13 Goal #3 Patient is able to ambulate 50 feet with assist device: walker - rolling at assistance level: supervision      Goal #4     Goal #5     Goal #6     Goal Comments: Goals established on 2023 all goals ongoing      SUBJECTIVE  \"I'm not leaving today, I was the one that talked to the doctor not you, you're not even a doctor\"  - when therapist questioned what pts preference for dc planning was     OBJECTIVE  Precautions: Bed/chair alarm (BLE lymphedema)    WEIGHT BEARING RESTRICTION  Weight Bearing Restriction: None                PAIN ASSESSMENT   Ratin          BALANCE                                                                                                                       Static Sitting: Fair -  Dynamic Sitting: Fair -           Static Standing: Fair -  Dynamic Standing: Fair -    ACTIVITY TOLERANCE                         O2 WALK         AM-PAC '6-Clicks' INPATIENT SHORT FORM - BASIC MOBILITY  How much difficulty does the patient currently have. .. Patient Difficulty: Turning over in bed (including adjusting bedclothes, sheets and blankets)?: None   Patient Difficulty: Sitting down on and standing up from a chair with arms (e.g., wheelchair, bedside commode, etc.): A Little   Patient Difficulty: Moving from lying on back to sitting on the side of the bed?: None   How much help from another person does the patient currently need. ..    Help from Another: Moving to and from a bed to a chair (including a wheelchair)?: A Little   Help from Another: Need to walk in hospital room?: A Little   Help from Another: Climbing 3-5 steps with a railing?: A Lot       AM-PAC Score:  Raw Score: 19   Approx Degree of Impairment: 41.77%   Standardized Score (AM-PAC Scale): 45.44   CMS Modifier (G-Code): CK    FUNCTIONAL ABILITY STATUS  Gait Assessment   Functional Mobility/Gait Assessment  Gait Assistance: Contact guard assist  Distance (ft): 25  Assistive Device: Rolling walker  Pattern: Shuffle    Skilled Therapy Provided    Bed Mobility:  Rolling: mod I    Supine<>Sit: SBA   Sit<>Supine: mod A *pt required assist to transfer BLE back onto the bed      Transfer Mobility:  Sit<>Stand: CGA *pt initially unable to perform as refusing to don socks and unable to obtain appropriate traction, with increased encouragement pt agreeable for therapist to assist in donning socks and then able to perform task with CGA   Stand<>Sit: CGA   Gait: CGA with RW, shuffle gait pattern, vc for RW placement       Patient End of Session: In bed;Needs met;Call light within reach;RN aware of session/findings; All patient questions and concerns addressed; Alarm set    PT Session Time: 23 minutes  Gait Trainin minutes  Therapeutic Activity:  minutes  Therapeutic Exercise:  minutes   Neuromuscular Re-education:  minutes

## 2023-11-13 NOTE — CM/SW NOTE
Spoke to pt's brother Jessica Gonzalez, on the phone re: DC planning. Pt's brother voiced concerns that pt is hallucinating and still having difficulty with mobility. He requests to have MD call him with update. Pt/brother had indicated earlier in their stay, that they may want BRANDYN at [facility]. Referrals sent by SW. Options discussed w/ pt's brother and list provided to pt at bedside. We also discussed long term plans for pt's care. Pt's chronic lymph edema limits her mobility, with previous stay, PT recommended outpatient therapy with lymph edema clinic. Per pt's brother, no lymph edema clinic was initiated. They were given information for Home Health Care. Per notes, [facility] was chosen, however, pt's brother report they have not seen pt. [facility] discussed with pt's brother Jessica Gonzalez, as they could assist with assisted/independent living and other services available. Brochure left at bedside. Pt's brother Jessica Gonzalez, will be at the bedside this PM. Requests to meet w/ CM face to face. Will follow up w/ brother, once he arrives. Dr. Flaco Zhang notified of pt/brother's request for update. 1430: Met w/ pt's brother Jessica Gonzalez. Plan for BR[facility]YN at [facility] [place], [facility] by [facility]. Spoke to Milly at [facility], she will reach out to pt's brother to assist w/ future long term plans. CM/SW will remain available for DC planning and/or support.        ZACHARY Mes[place], VIA [facility]    [id number]

## 2023-11-13 NOTE — PLAN OF CARE
Assumed care at 1930  A/Ox4, some confusion at times, on room air, NSR on tele  VTE with heparin   Cardiac diet  PRN norco given per mar   Extended PIV to right arm infusing 0.9 nacl at 83 ml/hr  Patient updated with plan of care  All needs met at this time    2330 - pt becoming more confused, hallucinating at this time    0600 - pt bladder scanned, showing >430 ml. Straight cath, removed 475 ml. Pt still confused and hallucinating most of night    Problem: SAFETY ADULT - FALL  Goal: Free from fall injury  Description: INTERVENTIONS:  - Assess pt frequently for physical needs  - Identify cognitive and physical deficits and behaviors that affect risk of falls.   - [place] fall precautions as indicated by assessment.  - Educate pt/family on patient safety including physical limitations  - Instruct pt to call for assistance with activity based on assessment  - Modify environment to reduce risk of injury  - Provide assistive devices as appropriate  - Consider OT/PT consult to assist with strengthening/mobility  - Encourage toileting schedule  Outcome: Progressing     Problem: METABOLIC/FLUID AND ELECTROLYTES - ADULT  Goal: Hemodynamic stability and optimal renal function maintained  Description: INTERVENTIONS:  - Monitor labs and assess for signs and symptoms of volume excess or deficit  - Monitor intake, output and patient weight  - Monitor urine specific gravity, serum osmolarity and serum sodium as indicated or ordered  - Monitor response to interventions for patient's volume status, including labs, urine output, blood pressure (other measures as available)  - Encourage oral intake as appropriate  - Instruct patient on fluid and nutrition restrictions as appropriate  Outcome: Progressing     Problem: SKIN/TISSUE INTEGRITY - ADULT  Goal: Incision(s), wounds(s) or drain site(s) healing without S/S of infection  Description: INTERVENTIONS:  - Assess and document risk factors for pressure ulcer development  - Assess and document skin integrity  - Assess and document dressing/incision, wound bed, drain sites and surrounding tissue  - Implement wound care per orders  - Initiate isolation precautions as appropriate  - Initiate Pressure Ulcer prevention bundle as indicated  Outcome: Progressing     Problem: MUSCULOSKELETAL - ADULT  Goal: Return mobility to safest level of function  Description: INTERVENTIONS:  - Assess patient stability and activity tolerance for standing, transferring and ambulating w/ or w/o assistive devices  - Assist with transfers and ambulation using safe patient handling equipment as needed  - Ensure adequate protection for wounds/incisions during mobilization  - Obtain PT/OT consults as needed  - Advance activity as appropriate  - Communicate ordered activity level and limitations with patient/family  Outcome: Progressing     Problem: PAIN - ADULT  Goal: Verbalizes/displays adequate comfort level or patient's stated pain goal  Description: INTERVENTIONS:  - Encourage pt to monitor pain and request assistance  - Assess pain using appropriate pain scale  - Administer analgesics based on type and severity of pain and evaluate response  - Implement non-pharmacological measures as appropriate and evaluate response  - Consider cultural and social influences on pain and pain management  - Manage/alleviate anxiety  - Utilize distraction and/or relaxation techniques  - Monitor for opioid side effects  - Notify MD/LIP if interventions unsuccessful or patient reports new pain  - Anticipate increased pain with activity and pre-medicate as appropriate  Outcome: Progressing     Problem: Patient/Family Goals  Goal: Patient/Family Long Term Goal  Description: Patient's Long Term Goal:discharge home/ recommended facility    Interventions:  -  PT/OT  -Antibiotic  - See additional Care Plan goals for specific interventions  Outcome: Progressing  Goal: Patient/Family Short Term Goal  Description: Patient's Short Term Goal: remains oriented/neuro intact    Interventions:   - orientation  -Meds  -antibiotic  - See additional Care Plan goals for specific interventions  Outcome: Progressing     Problem: Safety Risk - Non-Violent Restraints  Goal: Patient will remain free from self-harm  Description: INTERVENTIONS:  - Apply the least restrictive restraint to prevent harm  - Notify patient and family of reasons restraints applied  - Assess for any contributing factors to confusion (electrolyte disturbances, delirium, medications)  - Discontinue any unnecessary medical devices as soon as possible  - Assess the patient's physical comfort, circulation, skin condition, hydration, nutrition and elimination needs   - Reorient and redirection as needed  - Assess for the need to continue restraints  Outcome: Progressing     Problem: Delirium  Goal: Minimize duration of delirium  Description: Interventions:  - Encourage use of hearing aids, eye glasses  - Promote highest level of mobility daily  - Provide frequent reorientation  - Promote wakefulness i.e. lights on, blinds open  - Promote sleep, encourage patient's normal rest cycle i.e. lights off, TV off, minimize noise and interruptions  - Encourage family to assist in orientation and promotion of home routines  Outcome: Progressing     Problem: CONFUSION  Goal: Confusion, delirium, dementia or psychosis is improved or at baseline  Description: INTERVENTIONS:  - Assess for possible contributors to thought disturbance, including medications, impaired vision or hearing, underlying metabolic abnormalities, dehydration, psychiatric diagnoses, and notify attending MD/LIP  - [place] high risk fall precautions, as indicated  - Provide frequent short contacts to provide reality reorientation, refocusing and direction  - Decrease environmental stimuli, including noise as appropriate  - Monitor and intervene to maintain adequate nutrition, hydration, elimination, sleep and activity  - Consider the need for a sitter if unable to leave patient unattended  - [address] consult as indicated  - Consult Pharmacy as needed  Outcome: Progressing

## 2023-11-14 PROBLEM — R44.3 HALLUCINATION: Status: ACTIVE | Noted: 2023-11-14

## 2023-11-14 PROCEDURE — 99232 SBSQ HOSP IP/OBS MODERATE 35: CPT | Performed by: HOSPITALIST

## 2023-11-14 PROCEDURE — 99232 SBSQ HOSP IP/OBS MODERATE 35: CPT

## 2023-11-14 RX ORDER — LEVOFLOXACIN 500 MG/1
500 TABLET, FILM COATED ORAL DAILY
Qty: 5 TABLET | Refills: 0 | Status: SHIPPED | OUTPATIENT
Start: 2023-11-14 | End: 2023-11-14

## 2023-11-14 RX ORDER — RISPERIDONE 1 MG/1
1 TABLET ORAL NIGHTLY
Status: DISCONTINUED | OUTPATIENT
Start: 2023-11-14 | End: 2023-11-17

## 2023-11-14 RX ORDER — RISPERIDONE 0.5 MG/1
0.5 TABLET ORAL 2 TIMES DAILY PRN
Status: DISCONTINUED | OUTPATIENT
Start: 2023-11-14 | End: 2023-11-17

## 2023-11-14 RX ORDER — HALOPERIDOL 5 MG/ML
2 INJECTION INTRAMUSCULAR EVERY 6 HOURS PRN
Status: DISCONTINUED | OUTPATIENT
Start: 2023-11-14 | End: 2023-11-17

## 2023-11-14 RX ORDER — RISPERIDONE 0.5 MG/1
0.5 TABLET ORAL NIGHTLY PRN
Status: DISCONTINUED | OUTPATIENT
Start: 2023-11-14 | End: 2023-11-17

## 2023-11-14 NOTE — PROGRESS NOTES
Assumed care at 1600  A/O x 1? Fluctuates. Telling me about men in her room last night, then transitioned to telling me about godfrey márquez' memoir. RA  NSR / SB on tele  VSS  Denies pain  New extended IV placed in R arm - 0.9 @ 83 infusing  PRN IM haldol given per [facility] for agitation  Bladder scan complete - showed 500 ml. Pt able to urinate on her own. No straight cath needed. Rocephin Q24  Dressings to BLE - changed yesterday. Order to change every 48 hrs. Non-cardiac EP  Heparin for VTE  Plan for BRANDYN at discharge. All needs met. Will continue with plan of care.

## 2023-11-14 NOTE — PLAN OF CARE
The patient is A/Ox1, confused, appears calm this morning. The patient was able to eat some food, but later started to spit it out. On RA, dyspnea on exertion  Tele - NSR/SB  No c/o of pain. BLE lymphedema, wound care completed, tubigrips are on   Psych is following   IV ABX and IVF per [facility]   UNIT DRAW   Safety precautions in place. Staff will continue to monitor. Problem: SAFETY ADULT - FALL  Goal: Free from fall injury  Description: INTERVENTIONS:  - Assess pt frequently for physical needs  - Identify cognitive and physical deficits and behaviors that affect risk of falls.   - [place] fall precautions as indicated by assessment.  - Educate pt/family on patient safety including physical limitations  - Instruct pt to call for assistance with activity based on assessment  - Modify environment to reduce risk of injury  - Provide assistive devices as appropriate  - Consider OT/PT consult to assist with strengthening/mobility  - Encourage toileting schedule  Outcome: Progressing     Problem: METABOLIC/FLUID AND ELECTROLYTES - ADULT  Goal: Hemodynamic stability and optimal renal function maintained  Description: INTERVENTIONS:  - Monitor labs and assess for signs and symptoms of volume excess or deficit  - Monitor intake, output and patient weight  - Monitor urine specific gravity, serum osmolarity and serum sodium as indicated or ordered  - Monitor response to interventions for patient's volume status, including labs, urine output, blood pressure (other measures as available)  - Encourage oral intake as appropriate  - Instruct patient on fluid and nutrition restrictions as appropriate  Outcome: Progressing     Problem: SKIN/TISSUE INTEGRITY - ADULT  Goal: Incision(s), wounds(s) or drain site(s) healing without S/S of infection  Description: INTERVENTIONS:  - Assess and document risk factors for pressure ulcer development  - Assess and document skin integrity  - Assess and document dressing/incision, wound bed, drain sites and surrounding tissue  - Implement wound care per orders  - Initiate isolation precautions as appropriate  - Initiate Pressure Ulcer prevention bundle as indicated  Outcome: Progressing     Problem: MUSCULOSKELETAL - ADULT  Goal: Return mobility to safest level of function  Description: INTERVENTIONS:  - Assess patient stability and activity tolerance for standing, transferring and ambulating w/ or w/o assistive devices  - Assist with transfers and ambulation using safe patient handling equipment as needed  - Ensure adequate protection for wounds/incisions during mobilization  - Obtain PT/OT consults as needed  - Advance activity as appropriate  - Communicate ordered activity level and limitations with patient/family  Outcome: Progressing     Problem: PAIN - ADULT  Goal: Verbalizes/displays adequate comfort level or patient's stated pain goal  Description: INTERVENTIONS:  - Encourage pt to monitor pain and request assistance  - Assess pain using appropriate pain scale  - Administer analgesics based on type and severity of pain and evaluate response  - Implement non-pharmacological measures as appropriate and evaluate response  - Consider cultural and social influences on pain and pain management  - Manage/alleviate anxiety  - Utilize distraction and/or relaxation techniques  - Monitor for opioid side effects  - Notify MD/LIP if interventions unsuccessful or patient reports new pain  - Anticipate increased pain with activity and pre-medicate as appropriate  Outcome: Progressing     Problem: Patient/Family Goals  Goal: Patient/Family Long Term Goal  Description: Patient's Long Term Goal:discharge home/ recommended facility    Interventions:  -  PT/OT  -Antibiotic  - See additional Care Plan goals for specific interventions  Outcome: Progressing  Goal: Patient/Family Short Term Goal  Description: Patient's Short Term Goal: remains  oriented/neuro intact    Interventions:   - orientation  -Meds  -antibiotic  - See additional Care Plan goals for specific interventions  Outcome: Progressing     Problem: Safety Risk - Non-Violent Restraints  Goal: Patient will remain free from self-harm  Description: INTERVENTIONS:  - Apply the least restrictive restraint to prevent harm  - Notify patient and family of reasons restraints applied  - Assess for any contributing factors to confusion (electrolyte disturbances, delirium, medications)  - Discontinue any unnecessary medical devices as soon as possible  - Assess the patient's physical comfort, circulation, skin condition, hydration, nutrition and elimination needs   - Reorient and redirection as needed  - Assess for the need to continue restraints  Outcome: Progressing     Problem: Delirium  Goal: Minimize duration of delirium  Description: Interventions:  - Encourage use of hearing aids, eye glasses  - Promote highest level of mobility daily  - Provide frequent reorientation  - Promote wakefulness i.e. lights on, blinds open  - Promote sleep, encourage patient's normal rest cycle i.e. lights off, TV off, minimize noise and interruptions  - Encourage family to assist in orientation and promotion of home routines  Outcome: Progressing     Problem: CONFUSION  Goal: Confusion, delirium, dementia or psychosis is improved or at baseline  Description: INTERVENTIONS:  - Assess for possible contributors to thought disturbance, including medications, impaired vision or hearing, underlying metabolic abnormalities, dehydration, psychiatric diagnoses, and notify attending MD/LIP  - [place] high risk fall precautions, as indicated  - Provide frequent short contacts to provide reality reorientation, refocusing and direction  - Decrease environmental stimuli, including noise as appropriate  - Monitor and intervene to maintain adequate nutrition, hydration, elimination, sleep and activity  - Consider the need for a sitter if unable to leave patient unattended  - Initiate Spiritual Care consult as indicated  - Consult Pharmacy as needed  Outcome: Progressing

## 2023-11-14 NOTE — PLAN OF CARE
Assumed care at [address] [facility]. A&ox1 hallucinating  RA   SB on tele  Held metoprolol  Denies pain  NS infusing at 83 ml/hr  Wound care due Wednesday AM  Heparin VTE  Daily Rocephin  Safety precautions in place  All needs met at this time  Continue current plan of care      Problem: SAFETY ADULT - FALL  Goal: Free from fall injury  Description: INTERVENTIONS:  - Assess pt frequently for physical needs  - Identify cognitive and physical deficits and behaviors that affect risk of falls.   - [place] fall precautions as indicated by assessment.  - Educate pt/family on patient safety including physical limitations  - Instruct pt to call for assistance with activity based on assessment  - Modify environment to reduce risk of injury  - Provide assistive devices as appropriate  - Consider OT/PT consult to assist with strengthening/mobility  - Encourage toileting schedule  Outcome: Progressing

## 2023-11-15 PROCEDURE — 99233 SBSQ HOSP IP/OBS HIGH 50: CPT | Performed by: HOSPITALIST

## 2023-11-15 PROCEDURE — 99232 SBSQ HOSP IP/OBS MODERATE 35: CPT

## 2023-11-15 RX ORDER — HYDRALAZINE HYDROCHLORIDE 20 MG/ML
10 INJECTION INTRAMUSCULAR; INTRAVENOUS EVERY 4 HOURS PRN
Status: DISCONTINUED | OUTPATIENT
Start: 2023-11-15 | End: 2023-11-17

## 2023-11-15 RX ORDER — RISPERIDONE 0.5 MG/1
TABLET ORAL
Qty: 90 TABLET | Refills: 0 | Status: SHIPPED | OUTPATIENT
Start: 2023-11-15

## 2023-11-15 NOTE — PLAN OF CARE
Assumed care of 68 y/o female @2888  A/Ox self, confused  RA, SB-Tele  Cardiac diet, 1:1 feed  Incontinet, briefed  Denies pain  LFA ext PIV-infusing 0.9 @83 ml/hr  Safety prec maintained and all needs met        Problem: SKIN/TISSUE INTEGRITY - ADULT  Goal: Incision(s), wounds(s) or drain site(s) healing without S/S of infection  Description: INTERVENTIONS:  - Assess and document risk factors for pressure ulcer development  - Assess and document skin integrity  - Assess and document dressing/incision, wound bed, drain sites and surrounding tissue  - Implement wound care per orders  - Initiate isolation precautions as appropriate  - Initiate Pressure Ulcer prevention bundle as indicated  Outcome: Progressing

## 2023-11-15 NOTE — CM/SW NOTE
CM attached clinical updates to [facility] referral in [facility] system. Will follow up with patient and family to discuss discharge plan. Update:  0090: CM spoke to patient for discharge plan update; patient is agreeable to [address] for discharge and requested a private room if available. CM called admissions dept at [facility] for status update, left message for call back. Will endorse to [facility] coordination team for follow up tomorrow.            Torrey Miranda, RN Case Manager [id number]

## 2023-11-15 NOTE — PHYSICAL THERAPY NOTE
PHYSICAL THERAPY TREATMENT NOTE - INPATIENT    Room Number: 4161/2427-C     Session: 2     Number of Visits to Meet Established Goals: 4    Presenting Problem: Visual hallucinations  Co-Morbidities : lymphedema BLE, recurrent cellulitis BLE, DVT LE, morbid obesity, HTN    History related to current admission: Patient is a 67year old female admitted on 2023 from home for visual hallucinations. ASSESSMENT     Pt presents to therapy with decreased strength, functional mobility, endurance, balance, activity tolerance, trunk control. These impairments contribute to limitations in walking, transfers, bed mobility. Pt with overall downgrade in functional mobility since previous session. Continues to require frequent cuing for mobility management and safety awareness. Pt will continue to benefit from skilled IP PT to address impairments and functional limitations. DISCHARGE RECOMMENDATIONS  PT Discharge Recommendations: Sub-acute rehabilitation     PLAN  PT Treatment Plan: Bed mobility; Body mechanics; Endurance; Energy conservation;Patient education; Family education;Gait training;Balance training;Transfer training;Strengthening  Rehab Potential : Fair  Frequency (Obs):  (2-3x/week)    CURRENT GOALS   Goal #1 Patient is able to demonstrate supine - sit EOB @ level: supervision     Goal #2 Patient is able to demonstrate transfers EOB to/from Chair/Wheelchair at assistance level: supervision     Goal #3 Patient is able to ambulate 50 feet with assist device: walker - rolling at assistance level: supervision     Goal #4    Goal #5    Goal #6    Goal Comments: Goals established on 2023    11/15/2023 all goals ongoing       SUBJECTIVE  \"No further than your foot\"     OBJECTIVE  Precautions: Bed/chair alarm (BLE lymphedema)    WEIGHT BEARING RESTRICTION  Weight Bearing Restriction: None                PAIN ASSESSMENT   Ratin          BALANCE Static Sitting: Fair -  Dynamic Sitting: Poor +           Static Standing: Poor +  Dynamic Standing: Poor +    ACTIVITY TOLERANCE                         O2 WALK         AM-PAC '6-Clicks' INPATIENT SHORT FORM - BASIC MOBILITY  How much difficulty does the patient currently have. .. Patient Difficulty: Turning over in bed (including adjusting bedclothes, sheets and blankets)?: A Lot   Patient Difficulty: Sitting down on and standing up from a chair with arms (e.g., wheelchair, bedside commode, etc.): A Little   Patient Difficulty: Moving from lying on back to sitting on the side of the bed?: A Lot   How much help from another person does the patient currently need. .. Help from Another: Moving to and from a bed to a chair (including a wheelchair)?: A Little   Help from Another: Need to walk in hospital room?: A Little   Help from Another: Climbing 3-5 steps with a railing?: A Lot       AM-PAC Score:  Raw Score: 15   Approx Degree of Impairment: 57.7%   Standardized Score (AM-PAC Scale): 39.45   CMS Modifier (G-Code): CK    FUNCTIONAL ABILITY STATUS  Gait Assessment   Functional Mobility/Gait Assessment  Gait Assistance: Minimum assistance;Contact guard assist  Distance (ft): 15  Assistive Device: Rolling walker  Pattern:  (short step/stride length, decreased foot clearance LLE)    Skilled Therapy Provided    Gait Training:   Pt cued on upright standing posture to improve alignment with UEs  Pt cued on gait sequencing with RW - pt req Adolfo for RW mgmt and cues for RW movement   Pt cued on proper UE placement on RW handles - pt initially with UE placement on inferior bar of handle    Therapeutic Activity:   Pt cued on placement of UE and LEs for optimal force generation and safe STS transfer.    Pt cued on usage of arm rests for controlled descent into sitting  Pt cued on sitting posteriorly in chair to prevent sliding anteriorly - pt unable to perform on bariatric commode. Pt with multiple sit>stand repetitions, varying from Adolfo to supervision assist  Req cuing for R sidelying for sit>supine transfer    Bed Mobility:  Rolling: NT   Supine<>Sit: modA   Sit<>Supine: maxA x2     Transfer Mobility:  Sit<>Stand: SBA-Adolfo   Stand<>Sit:  supervision   Gait: CGA-Adlofo    Therapist's Comments: RN cleared for session. Pt agreeable for therapy, received supine. Instructed to call for nursing staff for any needs and OOB mobility. Patient End of Session: In bed;Needs met;Call light within reach;RN aware of session/findings; All patient questions and concerns addressed; Alarm set; Discussed recommendations with / (Discussed rec with brother)    PT Session Time: 30 minutes  Gait Training: 15 minutes  Therapeutic Activity: 10 minutes

## 2023-11-15 NOTE — PLAN OF CARE
The patient is A/Ox4 this AM, follows the commands and is cooperative with care. Good appetite noted. Per RN report, the patient was able to sleep last night. On RA, no SOB  Tele - SB  Vitals Stable  Afebrile  No c/o of pain. BLE lymphedema, tubigrips are on  Psych is following  On ABX per [facility]   PT eval is pending  Safety precautions in place. Staff will continue to monitor. Problem: SAFETY ADULT - FALL  Goal: Free from fall injury  Description: INTERVENTIONS:  - Assess pt frequently for physical needs  - Identify cognitive and physical deficits and behaviors that affect risk of falls.   - [place] fall precautions as indicated by assessment.  - Educate pt/family on patient safety including physical limitations  - Instruct pt to call for assistance with activity based on assessment  - Modify environment to reduce risk of injury  - Provide assistive devices as appropriate  - Consider OT/PT consult to assist with strengthening/mobility  - Encourage toileting schedule  Outcome: Progressing     Problem: METABOLIC/FLUID AND ELECTROLYTES - ADULT  Goal: Hemodynamic stability and optimal renal function maintained  Description: INTERVENTIONS:  - Monitor labs and assess for signs and symptoms of volume excess or deficit  - Monitor intake, output and patient weight  - Monitor urine specific gravity, serum osmolarity and serum sodium as indicated or ordered  - Monitor response to interventions for patient's volume status, including labs, urine output, blood pressure (other measures as available)  - Encourage oral intake as appropriate  - Instruct patient on fluid and nutrition restrictions as appropriate  Outcome: Progressing     Problem: SKIN/TISSUE INTEGRITY - ADULT  Goal: Incision(s), wounds(s) or drain site(s) healing without S/S of infection  Description: INTERVENTIONS:  - Assess and document risk factors for pressure ulcer development  - Assess and document skin integrity  - Assess and document dressing/incision, wound bed, drain sites and surrounding tissue  - Implement wound care per orders  - Initiate isolation precautions as appropriate  - Initiate Pressure Ulcer prevention bundle as indicated  Outcome: Progressing     Problem: MUSCULOSKELETAL - ADULT  Goal: Return mobility to safest level of function  Description: INTERVENTIONS:  - Assess patient stability and activity tolerance for standing, transferring and ambulating w/ or w/o assistive devices  - Assist with transfers and ambulation using safe patient handling equipment as needed  - Ensure adequate protection for wounds/incisions during mobilization  - Obtain PT/OT consults as needed  - Advance activity as appropriate  - Communicate ordered activity level and limitations with patient/family  Outcome: Progressing     Problem: PAIN - ADULT  Goal: Verbalizes/displays adequate comfort level or patient's stated pain goal  Description: INTERVENTIONS:  - Encourage pt to monitor pain and request assistance  - Assess pain using appropriate pain scale  - Administer analgesics based on type and severity of pain and evaluate response  - Implement non-pharmacological measures as appropriate and evaluate response  - Consider cultural and social influences on pain and pain management  - Manage/alleviate anxiety  - Utilize distraction and/or relaxation techniques  - Monitor for opioid side effects  - Notify MD/LIP if interventions unsuccessful or patient reports new pain  - Anticipate increased pain with activity and pre-medicate as appropriate  Outcome: Progressing     Problem: Patient/Family Goals  Goal: Patient/Family Long Term Goal  Description: Patient's Long Term Goal:discharge home/ recommended facility    Interventions:  -  PT/OT  -Antibiotic  - See additional Care Plan goals for specific interventions  Outcome: Progressing  Goal: Patient/Family Short Term Goal  Description: Patient's Short Term Goal: remains  oriented/neuro intact    Interventions:   - orientation  -Meds  -antibiotic  - See additional Care Plan goals for specific interventions  Outcome: Progressing     Problem: Safety Risk - Non-Violent Restraints  Goal: Patient will remain free from self-harm  Description: INTERVENTIONS:  - Apply the least restrictive restraint to prevent harm  - Notify patient and family of reasons restraints applied  - Assess for any contributing factors to confusion (electrolyte disturbances, delirium, medications)  - Discontinue any unnecessary medical devices as soon as possible  - Assess the patient's physical comfort, circulation, skin condition, hydration, nutrition and elimination needs   - Reorient and redirection as needed  - Assess for the need to continue restraints  Outcome: Progressing     Problem: Delirium  Goal: Minimize duration of delirium  Description: Interventions:  - Encourage use of hearing aids, eye glasses  - Promote highest level of mobility daily  - Provide frequent reorientation  - Promote wakefulness i.e. lights on, blinds open  - Promote sleep, encourage patient's normal rest cycle i.e. lights off, TV off, minimize noise and interruptions  - Encourage family to assist in orientation and promotion of home routines  Outcome: Progressing     Problem: CONFUSION  Goal: Confusion, delirium, dementia or psychosis is improved or at baseline  Description: INTERVENTIONS:  - Assess for possible contributors to thought disturbance, including medications, impaired vision or hearing, underlying metabolic abnormalities, dehydration, psychiatric diagnoses, and notify attending MD/LIP  - [place] high risk fall precautions, as indicated  - Provide frequent short contacts to provide reality reorientation, refocusing and direction  - Decrease environmental stimuli, including noise as appropriate  - Monitor and intervene to maintain adequate nutrition, hydration, elimination, sleep and activity  - Consider the need for a sitter if unable to leave patient unattended  - Initiate Spiritual Care consult as indicated  - Consult Pharmacy as needed  Outcome: Progressing

## 2023-11-16 PROCEDURE — 99232 SBSQ HOSP IP/OBS MODERATE 35: CPT | Performed by: HOSPITALIST

## 2023-11-16 NOTE — PLAN OF CARE
Assumed care at 1. A&O X4, RA, VSS. SB/SR on tele. Periods  of confusion noted. Ongoing pain management for pain to shoulders. Prn iv haldol  administered for agitation. Bladder scan obtained, see intake and output. IV Fluids infusing to LFA. Lymphedema to bilateral extremities with compression sleeve on. Contact isolation in place. Fall precaution and frequent staff rounding maintained. Problem: SAFETY ADULT - FALL  Goal: Free from fall injury  Description: INTERVENTIONS:  - Assess pt frequently for physical needs  - Identify cognitive and physical deficits and behaviors that affect risk of falls.   - [place] fall precautions as indicated by assessment.  - Educate pt/family on patient safety including physical limitations  - Instruct pt to call for assistance with activity based on assessment  - Modify environment to reduce risk of injury  - Provide assistive devices as appropriate  - Consider OT/PT consult to assist with strengthening/mobility  - Encourage toileting schedule  Outcome: Progressing     Problem: METABOLIC/FLUID AND ELECTROLYTES - ADULT  Goal: Hemodynamic stability and optimal renal function maintained  Description: INTERVENTIONS:  - Monitor labs and assess for signs and symptoms of volume excess or deficit  - Monitor intake, output and patient weight  - Monitor urine specific gravity, serum osmolarity and serum sodium as indicated or ordered  - Monitor response to interventions for patient's volume status, including labs, urine output, blood pressure (other measures as available)  - Encourage oral intake as appropriate  - Instruct patient on fluid and nutrition restrictions as appropriate  Outcome: Progressing     Problem: SKIN/TISSUE INTEGRITY - ADULT  Goal: Incision(s), wounds(s) or drain site(s) healing without S/S of infection  Description: INTERVENTIONS:  - Assess and document risk factors for pressure ulcer development  - Assess and document skin integrity  - Assess and document dressing/incision, wound bed, drain sites and surrounding tissue  - Implement wound care per orders  - Initiate isolation precautions as appropriate  - Initiate Pressure Ulcer prevention bundle as indicated  Outcome: Progressing     Problem: MUSCULOSKELETAL - ADULT  Goal: Return mobility to safest level of function  Description: INTERVENTIONS:  - Assess patient stability and activity tolerance for standing, transferring and ambulating w/ or w/o assistive devices  - Assist with transfers and ambulation using safe patient handling equipment as needed  - Ensure adequate protection for wounds/incisions during mobilization  - Obtain PT/OT consults as needed  - Advance activity as appropriate  - Communicate ordered activity level and limitations with patient/family  Outcome: Progressing     Problem: PAIN - ADULT  Goal: Verbalizes/displays adequate comfort level or patient's stated pain goal  Description: INTERVENTIONS:  - Encourage pt to monitor pain and request assistance  - Assess pain using appropriate pain scale  - Administer analgesics based on type and severity of pain and evaluate response  - Implement non-pharmacological measures as appropriate and evaluate response  - Consider cultural and social influences on pain and pain management  - Manage/alleviate anxiety  - Utilize distraction and/or relaxation techniques  - Monitor for opioid side effects  - Notify MD/LIP if interventions unsuccessful or patient reports new pain  - Anticipate increased pain with activity and pre-medicate as appropriate  Outcome: Progressing     Problem: Delirium  Goal: Minimize duration of delirium  Description: Interventions:  - Encourage use of hearing aids, eye glasses  - Promote highest level of mobility daily  - Provide frequent reorientation  - Promote wakefulness i.e. lights on, blinds open  - Promote sleep, encourage patient's normal rest cycle i.e. lights off, TV off, minimize noise and interruptions  - Encourage family to assist in orientation and promotion of home routines  Outcome: Progressing     Problem: CONFUSION  Goal: Confusion, delirium, dementia or psychosis is improved or at baseline  Description: INTERVENTIONS:  - Assess for possible contributors to thought disturbance, including medications, impaired vision or hearing, underlying metabolic abnormalities, dehydration, psychiatric diagnoses, and notify attending MD/LIP  - [place] high risk fall precautions, as indicated  - Provide frequent short contacts to provide reality reorientation, refocusing and direction  - Decrease environmental stimuli, including noise as appropriate  - Monitor and intervene to maintain adequate nutrition, hydration, elimination, sleep and activity  - Consider the need for a sitter if unable to leave patient unattended  - [address] consult as indicated  - Consult Pharmacy as needed  Outcome: Progressing

## 2023-11-16 NOTE — PHYSICAL THERAPY NOTE
PHYSICAL THERAPY TREATMENT NOTE - INPATIENT    Room Number: 0308/1931-P     Session: 3    Number of Visits to Meet Established Goals: 4    Presenting Problem: Visual hallucinations  Co-Morbidities : lymphedema BLE, recurrent cellulitis BLE, DVT LE, morbid obesity, HTN    History related to current admission: Patient is a 67year old female admitted on 2023 from home for visual hallucinations. ASSESSMENT     Pt followed commands for transfers, bed mobility and standing tasks. Patient hallucinated during session but able to redirect. Patient continues to be below baseline for functional mobility, strength, balance and endurance. Pt will benefit from ongoing skilled PT.     DISCHARGE RECOMMENDATIONS  PT Discharge Recommendations: Sub-acute rehabilitation     PLAN  PT Treatment Plan: Bed mobility; Body mechanics; Endurance; Energy conservation;Patient education; Family education;Gait training;Balance training;Transfer training;Strengthening  Rehab Potential : Fair  Frequency (Obs):  (2-3x/week)    CURRENT GOALS   Goal #1 Patient is able to demonstrate supine - sit EOB @ level: supervision     Goal #2 Patient is able to demonstrate transfers EOB to/from Chair/Wheelchair at assistance level: supervision     Goal #3 Patient is able to ambulate 50 feet with assist device: walker - rolling at assistance level: supervision     Goal #4    Goal #5    Goal #6    Goal Comments: Goals established on 2023 all goals ongoing       SUBJECTIVE  \" I have to ask my sister \" Jessika\".     OBJECTIVE  Precautions: Bed/chair alarm (BLE lymphedema)    WEIGHT BEARING RESTRICTION  Weight Bearing Restriction: None                PAIN ASSESSMENT   Ratin          BALANCE                                                                                                                       Static Sitting: Fair -  Dynamic Sitting: Poor +           Static Standing: Poor +  Dynamic Standing: Poor +    ACTIVITY TOLERANCE O2 WALK         AM-PAC '6-Clicks' INPATIENT SHORT FORM - BASIC MOBILITY  How much difficulty does the patient currently have. .. Patient Difficulty: Turning over in bed (including adjusting bedclothes, sheets and blankets)?: A Lot   Patient Difficulty: Sitting down on and standing up from a chair with arms (e.g., wheelchair, bedside commode, etc.): A Little   Patient Difficulty: Moving from lying on back to sitting on the side of the bed?: A Lot   How much help from another person does the patient currently need. .. Help from Another: Moving to and from a bed to a chair (including a wheelchair)?: A Little   Help from Another: Need to walk in hospital room?: A Little   Help from Another: Climbing 3-5 steps with a railing?: Total       AM-PAC Score:  Raw Score: 14   Approx Degree of Impairment: 61.29%   Standardized Score (AM-PAC Scale): 38.1   CMS Modifier (G-Code): CL    FUNCTIONAL ABILITY STATUS  Gait Assessment   Functional Mobility/Gait Assessment  Gait Assistance: Contact guard assist  Distance (ft): 3  Assistive Device: Rolling walker  Pattern:  (shuffle side steps only)    Skilled Therapy Provided    Ankle pumps performed prior to mobility. Bed Mobility:  Rolling: NT   Supine<>Sit: modA   Sit<>Supine: maxA x2     Transfer Mobility:  Sit<>Stand: SBA-Adolfo   Stand<>Sit:  supervision   Gait: CGA with RW. Only able to take side steps 3' towards L, increased effort and time required. Two seated rest due to dizziness. Patient unable to clear foot and needs cues for proper wt shift. Therapist's Comments: RN cleared for session. Pt agreeable for therapy, received supine. Instructed to call for nursing staff for any needs and OOB mobility. BP in sitting upon dizziness 167/87. Patient End of Session: In bed;Needs met;Call light within reach; All patient questions and concerns addressed; Alarm set    PT Session Time: 23 minutes    Therapeutic Activity: 23 minutes

## 2023-11-16 NOTE — CM/SW NOTE
Spoke with Reyes Harrison in admissions @ [address] [place] [address] to confirm ability to accept patient to a private room today.   Reyes Harrison to check with building if able to accept since haldol given within the past 24-hours    [place]  Phone  [phone]    Call received back from [facility]--unable to accept patient today--must be free of haldol for 24-48-hours

## 2023-11-16 NOTE — PLAN OF CARE
Assumed patient care @ 07:30. Alert and oriented x4, forgetful and confused at times. Room air.  0.9 NS infusing at 83ml. No complaint of pain. Incontinent of both, bladder scanned with result of 77ml. Had good bowel movement. Refused to change wound dressing. Compression sleeve on. Problem: SAFETY ADULT - FALL  Goal: Free from fall injury  Description: INTERVENTIONS:  - Assess pt frequently for physical needs  - Identify cognitive and physical deficits and behaviors that affect risk of falls.   - [place] fall precautions as indicated by assessment.  - Educate pt/family on patient safety including physical limitations  - Instruct pt to call for assistance with activity based on assessment  - Modify environment to reduce risk of injury  - Provide assistive devices as appropriate  - Consider OT/PT consult to assist with strengthening/mobility  - Encourage toileting schedule  Outcome: Progressing

## 2023-11-17 VITALS
HEART RATE: 61 BPM | OXYGEN SATURATION: 98 % | RESPIRATION RATE: 18 BRPM | SYSTOLIC BLOOD PRESSURE: 130 MMHG | DIASTOLIC BLOOD PRESSURE: 61 MMHG | TEMPERATURE: 98 F | WEIGHT: 275.38 LBS | BODY MASS INDEX: 49 KG/M2

## 2023-11-17 PROCEDURE — 99239 HOSP IP/OBS DSCHRG MGMT >30: CPT | Performed by: HOSPITALIST

## 2023-11-17 NOTE — PLAN OF CARE
Assumed patient care at 07:30. Alert and oriented x 4, no hallucination. Room air.  0.9 NS infusing at 83 ml./hr.  Complaint of pain on her back Norco given. NSR/SB on tele. Finished 100 % of her breakfast.  Wound assessment, dressing change per order. Needs attended, call light within her reach. Fall safety precautions maintained, bed alarm on. Problem: SAFETY ADULT - FALL  Goal: Free from fall injury  Description: INTERVENTIONS:  - Assess pt frequently for physical needs  - Identify cognitive and physical deficits and behaviors that affect risk of falls.   - [place] fall precautions as indicated by assessment.  - Educate pt/family on patient safety including physical limitations  - Instruct pt to call for assistance with activity based on assessment  - Modify environment to reduce risk of injury  - Provide assistive devices as appropriate  - Consider OT/PT consult to assist with strengthening/mobility  - Encourage toileting schedule  Outcome: Progressing     Problem: METABOLIC/FLUID AND ELECTROLYTES - ADULT  Goal: Hemodynamic stability and optimal renal function maintained  Description: INTERVENTIONS:  - Monitor labs and assess for signs and symptoms of volume excess or deficit  - Monitor intake, output and patient weight  - Monitor urine specific gravity, serum osmolarity and serum sodium as indicated or ordered  - Monitor response to interventions for patient's volume status, including labs, urine output, blood pressure (other measures as available)  - Encourage oral intake as appropriate  - Instruct patient on fluid and nutrition restrictions as appropriate  Outcome: Progressing     Problem: SKIN/TISSUE INTEGRITY - ADULT  Goal: Incision(s), wounds(s) or drain site(s) healing without S/S of infection  Description: INTERVENTIONS:  - Assess and document risk factors for pressure ulcer development  - Assess and document skin integrity  - Assess and document dressing/incision, wound bed, drain sites and surrounding tissue  - Implement wound care per orders  - Initiate isolation precautions as appropriate  - Initiate Pressure Ulcer prevention bundle as indicated  Outcome: Progressing     Problem: MUSCULOSKELETAL - ADULT  Goal: Return mobility to safest level of function  Description: INTERVENTIONS:  - Assess patient stability and activity tolerance for standing, transferring and ambulating w/ or w/o assistive devices  - Assist with transfers and ambulation using safe patient handling equipment as needed  - Ensure adequate protection for wounds/incisions during mobilization  - Obtain PT/OT consults as needed  - Advance activity as appropriate  - Communicate ordered activity level and limitations with patient/family  Outcome: Progressing     Problem: PAIN - ADULT  Goal: Verbalizes/displays adequate comfort level or patient's stated pain goal  Description: INTERVENTIONS:  - Encourage pt to monitor pain and request assistance  - Assess pain using appropriate pain scale  - Administer analgesics based on type and severity of pain and evaluate response  - Implement non-pharmacological measures as appropriate and evaluate response  - Consider cultural and social influences on pain and pain management  - Manage/alleviate anxiety  - Utilize distraction and/or relaxation techniques  - Monitor for opioid side effects  - Notify MD/LIP if interventions unsuccessful or patient reports new pain  - Anticipate increased pain with activity and pre-medicate as appropriate  Outcome: Progressing     Problem: Patient/Family Goals  Goal: Patient/Family Long Term Goal  Description: Patient's Long Term Goal:discharge home/ recommended facility    Interventions:  -  PT/OT  -Antibiotic  - See additional Care Plan goals for specific interventions  Outcome: Progressing  Goal: Patient/Family Short Term Goal  Description: Patient's Short Term Goal: remains  oriented/neuro intact    Interventions:   - orientation  -Meds  -antibiotic  - See additional Care Plan goals for specific interventions  Outcome: Progressing     Problem: Patient/Family Goals  Goal: Patient/Family Long Term Goal  Description: Patient's Long Term Goal:discharge home/ recommended facility    Interventions:  -  PT/OT  -Antibiotic  - See additional Care Plan goals for specific interventions  Outcome: Progressing  Goal: Patient/Family Short Term Goal  Description: Patient's Short Term Goal: remains  oriented/neuro intact    Interventions:   - orientation  -Meds  -antibiotic  - See additional Care Plan goals for specific interventions  Outcome: Progressing     Problem: Delirium  Goal: Minimize duration of delirium  Description: Interventions:  - Encourage use of hearing aids, eye glasses  - Promote highest level of mobility daily  - Provide frequent reorientation  - Promote wakefulness i.e. lights on, blinds open  - Promote sleep, encourage patient's normal rest cycle i.e. lights off, TV off, minimize noise and interruptions  - Encourage family to assist in orientation and promotion of home routines  Outcome: Progressing     Problem: CONFUSION  Goal: Confusion, delirium, dementia or psychosis is improved or at baseline  Description: INTERVENTIONS:  - Assess for possible contributors to thought disturbance, including medications, impaired vision or hearing, underlying metabolic abnormalities, dehydration, psychiatric diagnoses, and notify attending MD/LIP  - [place] high risk fall precautions, as indicated  - Provide frequent short contacts to provide reality reorientation, refocusing and direction  - Decrease environmental stimuli, including noise as appropriate  - Monitor and intervene to maintain adequate nutrition, hydration, elimination, sleep and activity  - Consider the need for a sitter if unable to leave patient unattended  - [address] consult as indicated  - Consult Pharmacy as needed  Outcome: Progressing

## 2023-11-17 NOTE — PROGRESS NOTES
Assumed care at [facility]  Confused at times  Hallucinating  RA  NSR on tele  Purewick in place  Contact isolation  Denies pain  NS infusing at 83  Compression sleeve to bilateral lower extremities  Safety precautions in place  All needs met at this time  Continue current plan of care

## 2023-11-17 NOTE — CM/SW NOTE
11/17/23 1500   Discharge disposition   Expected discharge disposition subacute   Post Acute Care Provider   (Mary Lawson)   Discharge transportation Orestes Gonzalez     SW spoke with Librado King from [facility] they are able to accept the pt and they have a bed available today. Orestes Gonzalez arranged for 6pm. Updated RN and Librado King at [facility]. CM updated pt's brother. PCS form completed and available for RN to print.       [facility]  [facility]    DAVINA Goncalves  Discharge Planner

## 2023-11-18 NOTE — PROGRESS NOTES
NURSING DISCHARGE NOTE    Discharged Rehab facility via Wheelchair. Accompanied by RN  Belongings Returned to patient from safe. [address] [place] [address] in [place], spoke to HAN ACUÑA. Discharged instructions and information regarding patient provided. Patient left the unit with her personal belongings via [address] [employer]. IV/telemonitor discontinued. Discharged papers brought via transporter/medicar.

## 2023-11-18 NOTE — PLAN OF CARE
Patient is a 59 y.o. female who presents for a follow up breast exam for a previous right partial mastectomy done on 01/12/16 for invasive ductal carcinoma. Patient relays she's still experiencing right breast pain at the site of her incision site, which she describes as being constant & scores as a 9/10. Patient denies noticing any new breast lumps or other breast changes. 1. Have you been to the ER, urgent care clinic since your last visit? Hospitalized since your last visit? No    2. Have you seen or consulted any other health care providers outside of the [address] [place] [address] since your last visit? Include any pap smears or colon screening.  No Problem: SAFETY ADULT - FALL  Goal: Free from fall injury  Description: INTERVENTIONS:  - Assess pt frequently for physical needs  - Identify cognitive and physical deficits and behaviors that affect risk of falls.   - [place] fall precautions as indicated by assessment.  - Educate pt/family on patient safety including physical limitations  - Instruct pt to call for assistance with activity based on assessment  - Modify environment to reduce risk of injury  - Provide assistive devices as appropriate  - Consider OT/PT consult to assist with strengthening/mobility  - Encourage toileting schedule  11/17/2023 1845 by Hitesh Patricio RN  Outcome: Completed  11/17/2023 1108 by Hitesh Patricio RN  Outcome: Progressing

## 2023-11-21 ENCOUNTER — INITIAL APN SNF VISIT (OUTPATIENT)
Dept: INTERNAL MEDICINE CLINIC | Age: 73
End: 2023-11-21

## 2023-11-21 DIAGNOSIS — N30.00 ACUTE CYSTITIS WITHOUT HEMATURIA: Primary | ICD-10-CM

## 2023-11-21 DIAGNOSIS — R41.0 EPISODE OF CONFUSION: ICD-10-CM

## 2023-11-21 DIAGNOSIS — I83.029 VENOUS ULCERS OF BOTH LOWER EXTREMITIES (HCC): ICD-10-CM

## 2023-11-21 DIAGNOSIS — G89.29 CHRONIC PAIN OF BOTH SHOULDERS: ICD-10-CM

## 2023-11-21 DIAGNOSIS — L97.929 VENOUS ULCERS OF BOTH LOWER EXTREMITIES (HCC): ICD-10-CM

## 2023-11-21 DIAGNOSIS — I83.019 VENOUS ULCERS OF BOTH LOWER EXTREMITIES (HCC): ICD-10-CM

## 2023-11-21 DIAGNOSIS — I89.0 LYMPHEDEMA OF BOTH LOWER EXTREMITIES: ICD-10-CM

## 2023-11-21 DIAGNOSIS — M25.511 CHRONIC PAIN OF BOTH SHOULDERS: ICD-10-CM

## 2023-11-21 DIAGNOSIS — R53.81 PHYSICAL DECONDITIONING: ICD-10-CM

## 2023-11-21 DIAGNOSIS — E78.5 HYPERLIPIDEMIA, UNSPECIFIED HYPERLIPIDEMIA TYPE: ICD-10-CM

## 2023-11-21 DIAGNOSIS — L97.919 VENOUS ULCERS OF BOTH LOWER EXTREMITIES (HCC): ICD-10-CM

## 2023-11-21 DIAGNOSIS — R60.0 BILATERAL LEG EDEMA: ICD-10-CM

## 2023-11-21 DIAGNOSIS — M62.81 MUSCLE WEAKNESS: ICD-10-CM

## 2023-11-21 DIAGNOSIS — M25.512 CHRONIC PAIN OF BOTH SHOULDERS: ICD-10-CM

## 2023-11-21 PROCEDURE — 99310 SBSQ NF CARE HIGH MDM 45: CPT | Performed by: NURSE PRACTITIONER

## 2023-11-21 PROCEDURE — 1111F DSCHRG MED/CURRENT MED MERGE: CPT | Performed by: NURSE PRACTITIONER

## 2023-11-24 VITALS
HEART RATE: 73 BPM | SYSTOLIC BLOOD PRESSURE: 165 MMHG | RESPIRATION RATE: 20 BRPM | TEMPERATURE: 97 F | WEIGHT: 293 LBS | BODY MASS INDEX: 53 KG/M2 | OXYGEN SATURATION: 99 % | DIASTOLIC BLOOD PRESSURE: 87 MMHG

## 2023-11-24 NOTE — PROGRESS NOTES
LUCRETIA Paulson    1950  APRN Encounter 23, 230pm    Met with Mrs. Hong at bedside with staff nurses to assess lower extremity lymphedema and open wounds with weeping. Alessia Patel  : 1950  Age 67year old  female patient is admitted to [address] [place] [address] for rehabilitation and strengthening. [facility] Admission:  23  Discharged 23 to [address] [place] [address]    Chief complaint:  UTI, Bilateral severe lymphedema with open wound lower extremities. Stasis Dermatitis    HPI:  67 yr old female presented to the emergency department on 2023 with her brother, Desmond Prince who reported her having visual hallucinations. UA  showed a UTI; culture grew Proteus Mirabilis. ID was consulted for antibiotic. Pt was started on IV Rocephin on 23. Patient has been OR X 1 to OR x 4. She reported to staff that she sees dogs in her room. Psychiatry was consulted. Pt's brother Desmond Prince reported hallucinations for about 1 month. She was recently hospitalized from 10/22/2023-10/27/2023 for cellulitis. Multiple hospitalizations previously:  2023, 2023-2023, and 2023-2023 for lymphedema complications. Desmond Prince denied previous hallucinations or any hx of psyche hospitalizations.     Past Medical History:   Diagnosis Date    Cataract     right eye completed; future plan for left eye procceure    Disorder of thyroid 2023    uses synthroid    Essential hypertension     High blood pressure     High cholesterol     Hx of pancreatitis     6 times    Hyperlipidemia     Lymphedema of both lower extremities     longstanding, unknown origin    Muscle weakness     Visual impairment      Past Surgical History:   Procedure Laterality Date    CATARACT      CHOLECYSTECTOMY       Family History   Problem Relation Age of Onset    Hypertension Father     Heart Disorder Paternal Grandfather     Psychiatric Sister     Lipids Brother     Obesity Sister      Social History Socioeconomic History    Marital status: Single   Tobacco Use    Smoking status: Never    Smokeless tobacco: Never   Vaping Use    Vaping Use: Never used   Substance and Sexual Activity    Alcohol use: Not Currently    Drug use: Not Currently     Social Determinants of Health     Food Insecurity: No Food Insecurity (11/9/2023)    Food Insecurity     Food Insecurity: Never true   Transportation Needs: Unmet Transportation Needs (11/9/2023)    Transportation Needs     Lack of Transportation: Yes   Housing Stability: Low Risk  (11/9/2023)    Housing Stability     Housing Instability: No     IMMUNIZATIONS  Immunization History   Administered Date(s) Administered    Covid-19 Vaccine [employer] (J&J) 0.5ml 04/06/2021, 04/14/2022    FLU VAC High Dose 65 YRS & Older PRSV Free (11050) 11/17/2023     ALLERGIES:  Allergies   Allergen Reactions    Amoxicillin HIVES     Tolerates cefazolin (8/6/21)    Erythromycin HIVES    Penicillins HIVES     Tolerates rocephin in the past, tolerates cefazolin (8/6/21)      Tetracycline HIVES    Alc-Benzyl Alc-Sulfamethoxazole-Trimethoprim RASH    Sulfa Antibiotics RASH    Tetracyclines & Related RASH    Opioid Analgesics NAUSEA ONLY     Morphine       CODE STATUS:  Full Code  ADVANCED CARE PLANNING TEAM: Will need family care plan mts to discuss discharge. CURRENT MEDICATIONS - reviewed and updated on SNF EMAR. Risperidone 1 mg q hs  Atorvastatin 10 mg q hs  Carbamide peroxide 6.5% - 5 gtt both ears 2 x daily  Clear eyes for dry eyes 1-0.25. 1 gtt both eyes  Vitamin B 12  1000 daily  levothyroxine 88 mcg daily  Metoprolol Tartrate 100 qam  Metoprolol Tartrate 50 q hs  Nystatin 1000,000 powder prn   Nasal spray 0.65% sod. chloride prn  Torsemide 20 mg daily; 40 q saturday  Tramadol 50 q 6 hrs prn  Vit D 2000 daily     SUBJECTIVE: denies headache, chest pain, SOB. C/O chronic ear congestion. Denies n/v/d/c. C/O leg heaviness and pain lower extremities.   R leg and hip pain > Left.    PHYSICAL EXAM: 165/87. P 83. RR 20.  POx 99%. T 97.4. Weight 300.8#. GENERAL HEALTH:well developed, well nourished, in no apparent distress   LINES, TUBES, DRAINS:  none  SKIN: warm, dry multiple LE open wounds w/weeping. WOUND: wound assessment per staff nurses. Wound MD to follow. EYES: Pupils round and equal; EOMI, no jaundice. conjunctiva normal; no nystagmus, + dry eyes  HENT: no nasal drainage, mucous membranes pink, moist, daily ear drops. Zoe Coppola NECK:supple, FROM  BREAST: deferred exam   RESPIRATORY:lungs clear  CARDIOVASCULAR:  RRR, rate 73. HX A-fib  ABDOMEN:  round, active BS+, soft, nondistended; no guarding, no rebound tenderness. :Deferred recent UTI  LYMPHATIC:lymphedema  severe with weeping and open  wounds. MUSCULOSKELETAL: limited mobility d/t edema and LE edema  EXTREMITIES: + edema. Open wound R anterior foot; increased redness posterior knee. Left let open wounds; + weeping  NEUROLOGIC:follows commands  PSYCHIATRIC: alert and oriented x 3; affect appropriate  and anxious     MEDICAL DECISION MAKING: brother Patti Bowden assists w/medical decisions    Lab Results:  WBC 5.6. HGB 11.2. Plts 137. Na 144. K 3.6. Weekly labs. Wound MD to follow    Assessment  / Plan    metabolic encephalopathy with hallucinations; likely secondary to UTI  Completed IV antibiotics.       Hallucinations  Risperidone 1 mg  q hs     Chronic, severe Lymphedema with weeping and open wounds  Torsemide daily  Wound MD to follow  Wrap with kerlix and ACE wraps  Pt//OT    ROSMERY-resolved     Hypertension Metoprolol tart 100 mg qam and 50 mg q hs    Hypothyroidism - Levothyroxine 88 mcg qam    HL  atorvastatin 10 mg q hs     Morbid obesity  -  BMi, 44.1  Dietician to follow     Atrial fibrillation - Metroprolol    Dry Eyes:  carboxymethylcellu/glycerin - 1 gtt both eyes prn    Ear congestion:  carbamide peroxide 6.5%; 5 gtts both ears 2 x day    Chronic pain:  Tramadol 50 mg q 6 hrs prn    Dispo:  Goal is to return home with home health and lymphedema therapist.       [facility] with PCP within 1-2 weeks following [facility] discharge. *Follow-Up with specialists as recommended. Future Appointments   Date Time Provider Naz Rosa   12/7/2023  3:45 PM Tessy Adame DPM [id number]   *Greater than 45 minutes spent w/ patient and staff, including but not limited to/ reviewing present status, needs, abilities with disciplines, reviewing medical records, vital signs, labs, completing med rec and entering orders to establish plan of care in [facility]. Note to patient: The [facility] makes medical notes like these available to patients in the interest of transparency. However, this is a medical document intended as peer to peer communication. It is written in medical language and may contain abbreviations or verbiage that are unfamiliar. It may appear blunt or direct. Medical documents are intended to carry relevant information, facts as evident, and the clinical opinion of the practitioner who signs the document.     Jeanne Ogden, APRN  11/21/23   230 pm  [facility] Acute Care Team

## 2023-11-25 ENCOUNTER — SNF VISIT (OUTPATIENT)
Dept: INTERNAL MEDICINE CLINIC | Age: 73
End: 2023-11-25

## 2023-11-25 DIAGNOSIS — I83.029 VENOUS ULCERS OF BOTH LOWER EXTREMITIES (HCC): ICD-10-CM

## 2023-11-25 DIAGNOSIS — R53.81 PHYSICAL DECONDITIONING: ICD-10-CM

## 2023-11-25 DIAGNOSIS — M25.512 CHRONIC PAIN OF BOTH SHOULDERS: ICD-10-CM

## 2023-11-25 DIAGNOSIS — R60.0 HAND EDEMA: ICD-10-CM

## 2023-11-25 DIAGNOSIS — M25.511 CHRONIC PAIN OF BOTH SHOULDERS: ICD-10-CM

## 2023-11-25 DIAGNOSIS — L97.929 VENOUS ULCERS OF BOTH LOWER EXTREMITIES (HCC): ICD-10-CM

## 2023-11-25 DIAGNOSIS — R60.0 BILATERAL LEG EDEMA: ICD-10-CM

## 2023-11-25 DIAGNOSIS — R41.0 EPISODE OF CONFUSION: Primary | ICD-10-CM

## 2023-11-25 DIAGNOSIS — L97.919 VENOUS ULCERS OF BOTH LOWER EXTREMITIES (HCC): ICD-10-CM

## 2023-11-25 DIAGNOSIS — G89.29 CHRONIC PAIN OF BOTH SHOULDERS: ICD-10-CM

## 2023-11-25 DIAGNOSIS — I89.0 LYMPHEDEMA OF BOTH LOWER EXTREMITIES: ICD-10-CM

## 2023-11-25 DIAGNOSIS — I83.019 VENOUS ULCERS OF BOTH LOWER EXTREMITIES (HCC): ICD-10-CM

## 2023-11-25 DIAGNOSIS — E07.9 DISORDER OF THYROID: ICD-10-CM

## 2023-11-30 ENCOUNTER — SNF VISIT (OUTPATIENT)
Dept: INTERNAL MEDICINE CLINIC | Age: 73
End: 2023-11-30

## 2023-11-30 DIAGNOSIS — I89.0 LYMPHEDEMA OF BOTH LOWER EXTREMITIES: Primary | ICD-10-CM

## 2023-11-30 DIAGNOSIS — E87.6 HYPOKALEMIA: ICD-10-CM

## 2023-11-30 DIAGNOSIS — I83.019 VENOUS ULCERS OF BOTH LOWER EXTREMITIES (HCC): ICD-10-CM

## 2023-11-30 DIAGNOSIS — M25.512 CHRONIC PAIN OF BOTH SHOULDERS: ICD-10-CM

## 2023-11-30 DIAGNOSIS — R60.0 BILATERAL LEG EDEMA: ICD-10-CM

## 2023-11-30 DIAGNOSIS — L97.919 VENOUS ULCERS OF BOTH LOWER EXTREMITIES (HCC): ICD-10-CM

## 2023-11-30 DIAGNOSIS — R60.0 HAND EDEMA: ICD-10-CM

## 2023-11-30 DIAGNOSIS — L97.929 VENOUS ULCERS OF BOTH LOWER EXTREMITIES (HCC): ICD-10-CM

## 2023-11-30 DIAGNOSIS — G89.29 CHRONIC PAIN OF BOTH SHOULDERS: ICD-10-CM

## 2023-11-30 DIAGNOSIS — E07.9 DISORDER OF THYROID: ICD-10-CM

## 2023-11-30 DIAGNOSIS — M25.511 CHRONIC PAIN OF BOTH SHOULDERS: ICD-10-CM

## 2023-11-30 DIAGNOSIS — I83.029 VENOUS ULCERS OF BOTH LOWER EXTREMITIES (HCC): ICD-10-CM

## 2023-11-30 DIAGNOSIS — R53.81 PHYSICAL DECONDITIONING: ICD-10-CM

## 2023-12-04 ENCOUNTER — APPOINTMENT (OUTPATIENT)
Dept: GENERAL RADIOLOGY | Facility: HOSPITAL | Age: 73
End: 2023-12-04
Attending: EMERGENCY MEDICINE
Payer: MEDICARE

## 2023-12-04 ENCOUNTER — HOSPITAL ENCOUNTER (INPATIENT)
Facility: HOSPITAL | Age: 73
LOS: 4 days | Discharge: NURSING, REHAB OR LONG-TERM CARE | End: 2023-12-08
Attending: EMERGENCY MEDICINE | Admitting: INTERNAL MEDICINE
Payer: MEDICARE

## 2023-12-04 VITALS
BODY MASS INDEX: 53 KG/M2 | RESPIRATION RATE: 20 BRPM | WEIGHT: 293 LBS | HEART RATE: 80 BPM | DIASTOLIC BLOOD PRESSURE: 83 MMHG | OXYGEN SATURATION: 96 % | TEMPERATURE: 98 F | SYSTOLIC BLOOD PRESSURE: 155 MMHG

## 2023-12-04 VITALS
HEART RATE: 72 BPM | RESPIRATION RATE: 18 BRPM | WEIGHT: 293 LBS | OXYGEN SATURATION: 95 % | DIASTOLIC BLOOD PRESSURE: 87 MMHG | BODY MASS INDEX: 53 KG/M2 | TEMPERATURE: 98 F | SYSTOLIC BLOOD PRESSURE: 159 MMHG

## 2023-12-04 DIAGNOSIS — I89.0 LYMPHEDEMA: Primary | ICD-10-CM

## 2023-12-04 PROBLEM — D64.9 ANEMIA: Status: ACTIVE | Noted: 2023-12-04

## 2023-12-04 PROBLEM — E87.3 METABOLIC ALKALOSIS: Status: ACTIVE | Noted: 2023-12-04

## 2023-12-04 PROBLEM — R73.9 HYPERGLYCEMIA: Status: ACTIVE | Noted: 2023-12-04

## 2023-12-04 LAB
ALBUMIN SERPL-MCNC: 2.4 G/DL (ref 3.4–5)
ALBUMIN/GLOB SERPL: 0.7 {RATIO} (ref 1–2)
ALP LIVER SERPL-CCNC: 78 U/L
ALT SERPL-CCNC: 19 U/L
ANION GAP SERPL CALC-SCNC: 3 MMOL/L (ref 0–18)
AST SERPL-CCNC: 32 U/L (ref 15–37)
BASOPHILS # BLD AUTO: 0.02 X10(3) UL (ref 0–0.2)
BASOPHILS NFR BLD AUTO: 0.3 %
BILIRUB SERPL-MCNC: 0.5 MG/DL (ref 0.1–2)
BILIRUB UR QL STRIP.AUTO: NEGATIVE
BUN BLD-MCNC: 24 MG/DL (ref 9–23)
CALCIUM BLD-MCNC: 8 MG/DL (ref 8.5–10.1)
CHLORIDE SERPL-SCNC: 102 MMOL/L (ref 98–112)
CLARITY UR REFRACT.AUTO: CLEAR
CO2 SERPL-SCNC: 34 MMOL/L (ref 21–32)
COLOR UR AUTO: COLORLESS
CREAT BLD-MCNC: 1.16 MG/DL
EGFRCR SERPLBLD CKD-EPI 2021: 50 ML/MIN/1.73M2 (ref 60–?)
EOSINOPHIL # BLD AUTO: 0.24 X10(3) UL (ref 0–0.7)
EOSINOPHIL NFR BLD AUTO: 3.7 %
ERYTHROCYTE [DISTWIDTH] IN BLOOD BY AUTOMATED COUNT: 13 %
GLOBULIN PLAS-MCNC: 3.5 G/DL (ref 2.8–4.4)
GLUCOSE BLD-MCNC: 117 MG/DL (ref 70–99)
GLUCOSE UR STRIP.AUTO-MCNC: NORMAL MG/DL
HCT VFR BLD AUTO: 34.7 %
HGB BLD-MCNC: 11.7 G/DL
IMM GRANULOCYTES # BLD AUTO: 0.02 X10(3) UL (ref 0–1)
IMM GRANULOCYTES NFR BLD: 0.3 %
KETONES UR STRIP.AUTO-MCNC: NEGATIVE MG/DL
LEUKOCYTE ESTERASE UR QL STRIP.AUTO: 75
LIPASE SERPL-CCNC: 20 U/L (ref 13–75)
LYMPHOCYTES # BLD AUTO: 0.85 X10(3) UL (ref 1–4)
LYMPHOCYTES NFR BLD AUTO: 13.2 %
MCH RBC QN AUTO: 33.2 PG (ref 26–34)
MCHC RBC AUTO-ENTMCNC: 33.7 G/DL (ref 31–37)
MCV RBC AUTO: 98.6 FL
MONOCYTES # BLD AUTO: 0.62 X10(3) UL (ref 0.1–1)
MONOCYTES NFR BLD AUTO: 9.6 %
NEUTROPHILS # BLD AUTO: 4.69 X10 (3) UL (ref 1.5–7.7)
NEUTROPHILS # BLD AUTO: 4.69 X10(3) UL (ref 1.5–7.7)
NEUTROPHILS NFR BLD AUTO: 72.9 %
NITRITE UR QL STRIP.AUTO: NEGATIVE
NT-PROBNP SERPL-MCNC: 358 PG/ML (ref ?–125)
OSMOLALITY SERPL CALC.SUM OF ELEC: 293 MOSM/KG (ref 275–295)
PH UR STRIP.AUTO: 7.5 [PH] (ref 5–8)
PLATELET # BLD AUTO: 149 10(3)UL (ref 150–450)
POTASSIUM SERPL-SCNC: 3.8 MMOL/L (ref 3.5–5.1)
PROT SERPL-MCNC: 5.9 G/DL (ref 6.4–8.2)
PROT UR STRIP.AUTO-MCNC: NEGATIVE MG/DL
RBC # BLD AUTO: 3.52 X10(6)UL
RBC UR QL AUTO: NEGATIVE
SARS-COV-2 RNA RESP QL NAA+PROBE: NOT DETECTED
SODIUM SERPL-SCNC: 139 MMOL/L (ref 136–145)
SP GR UR STRIP.AUTO: 1.01 (ref 1–1.03)
UROBILINOGEN UR STRIP.AUTO-MCNC: NORMAL MG/DL
WBC # BLD AUTO: 6.4 X10(3) UL (ref 4–11)

## 2023-12-04 PROCEDURE — 99223 1ST HOSP IP/OBS HIGH 75: CPT | Performed by: HOSPITALIST

## 2023-12-04 PROCEDURE — 71045 X-RAY EXAM CHEST 1 VIEW: CPT | Performed by: EMERGENCY MEDICINE

## 2023-12-04 RX ORDER — ATORVASTATIN CALCIUM 10 MG/1
10 TABLET, FILM COATED ORAL NIGHTLY
Status: DISCONTINUED | OUTPATIENT
Start: 2023-12-04 | End: 2023-12-08

## 2023-12-04 RX ORDER — POLYETHYLENE GLYCOL 3350 17 G/17G
17 POWDER, FOR SOLUTION ORAL DAILY PRN
Status: DISCONTINUED | OUTPATIENT
Start: 2023-12-04 | End: 2023-12-08

## 2023-12-04 RX ORDER — MELATONIN
3 NIGHTLY PRN
Status: DISCONTINUED | OUTPATIENT
Start: 2023-12-04 | End: 2023-12-08

## 2023-12-04 RX ORDER — CHOLECALCIFEROL (VITAMIN D3) 125 MCG
2000 CAPSULE ORAL DAILY
Status: DISCONTINUED | OUTPATIENT
Start: 2023-12-05 | End: 2023-12-08

## 2023-12-04 RX ORDER — MELATONIN
1000 DAILY
Status: DISCONTINUED | OUTPATIENT
Start: 2023-12-05 | End: 2023-12-08

## 2023-12-04 RX ORDER — GABAPENTIN 100 MG/1
100 CAPSULE ORAL
COMMUNITY
Start: 2023-11-20

## 2023-12-04 RX ORDER — METOPROLOL TARTRATE 50 MG/1
50 TABLET, FILM COATED ORAL NIGHTLY
Status: DISCONTINUED | OUTPATIENT
Start: 2023-12-04 | End: 2023-12-08

## 2023-12-04 RX ORDER — LEVOTHYROXINE SODIUM 88 UG/1
88 TABLET ORAL
Status: DISCONTINUED | OUTPATIENT
Start: 2023-12-05 | End: 2023-12-08

## 2023-12-04 RX ORDER — FUROSEMIDE 10 MG/ML
40 INJECTION INTRAMUSCULAR; INTRAVENOUS DAILY
Status: DISCONTINUED | OUTPATIENT
Start: 2023-12-05 | End: 2023-12-05

## 2023-12-04 RX ORDER — FUROSEMIDE 10 MG/ML
40 INJECTION INTRAMUSCULAR; INTRAVENOUS ONCE
Status: COMPLETED | OUTPATIENT
Start: 2023-12-04 | End: 2023-12-04

## 2023-12-04 RX ORDER — ECHINACEA PURPUREA EXTRACT 125 MG
1 TABLET ORAL
Status: DISCONTINUED | OUTPATIENT
Start: 2023-12-04 | End: 2023-12-08

## 2023-12-04 RX ORDER — RISPERIDONE 0.25 MG/1
0.5 TABLET ORAL NIGHTLY PRN
Status: DISCONTINUED | OUTPATIENT
Start: 2023-12-04 | End: 2023-12-08

## 2023-12-04 RX ORDER — ENEMA 19; 7 G/133ML; G/133ML
1 ENEMA RECTAL ONCE AS NEEDED
Status: DISCONTINUED | OUTPATIENT
Start: 2023-12-04 | End: 2023-12-08

## 2023-12-04 RX ORDER — BISACODYL 10 MG
10 SUPPOSITORY, RECTAL RECTAL
Status: DISCONTINUED | OUTPATIENT
Start: 2023-12-04 | End: 2023-12-08

## 2023-12-04 RX ORDER — SENNOSIDES 8.6 MG
17.2 TABLET ORAL NIGHTLY PRN
Status: DISCONTINUED | OUTPATIENT
Start: 2023-12-04 | End: 2023-12-08

## 2023-12-04 RX ORDER — ENOXAPARIN SODIUM 100 MG/ML
0.5 INJECTION SUBCUTANEOUS DAILY
Status: DISCONTINUED | OUTPATIENT
Start: 2023-12-05 | End: 2023-12-08

## 2023-12-04 RX ORDER — ONDANSETRON 2 MG/ML
4 INJECTION INTRAMUSCULAR; INTRAVENOUS EVERY 6 HOURS PRN
Status: DISCONTINUED | OUTPATIENT
Start: 2023-12-04 | End: 2023-12-08

## 2023-12-04 RX ORDER — ACETAMINOPHEN 500 MG
1000 TABLET ORAL EVERY 4 HOURS PRN
Status: DISCONTINUED | OUTPATIENT
Start: 2023-12-04 | End: 2023-12-08

## 2023-12-04 RX ORDER — RIVAROXABAN 20 MG/1
20 TABLET, FILM COATED ORAL DAILY
COMMUNITY
Start: 2023-07-05 | End: 2023-12-08

## 2023-12-04 RX ORDER — METOPROLOL TARTRATE 100 MG/1
100 TABLET ORAL
Status: DISCONTINUED | OUTPATIENT
Start: 2023-12-05 | End: 2023-12-08

## 2023-12-04 RX ORDER — RISPERIDONE 1 MG/1
1 TABLET ORAL NIGHTLY
Status: DISCONTINUED | OUTPATIENT
Start: 2023-12-04 | End: 2023-12-08

## 2023-12-04 RX ORDER — TRAMADOL HYDROCHLORIDE 50 MG/1
50 TABLET ORAL EVERY 6 HOURS PRN
Status: DISCONTINUED | OUTPATIENT
Start: 2023-12-04 | End: 2023-12-08

## 2023-12-04 NOTE — ED INITIAL ASSESSMENT (HPI)
Patient received via [facility] from [facility] for generalized Edema. Patient states she has had edema for a while, currently on Torsemide with no improvement. Patient rates pain 7 out of 10 on pain scale.

## 2023-12-04 NOTE — ED QUICK NOTES
Report received from Cassy Beatty, [place]. Assumed care of pt at this time. Rounding Completed    Plan of Care reviewed. Waiting for results. Elimination needs assessed. Provided repositioning for comfort. Bed is locked and in lowest position. Call light within reach.

## 2023-12-05 LAB
ANION GAP SERPL CALC-SCNC: 4 MMOL/L (ref 0–18)
BUN BLD-MCNC: 22 MG/DL (ref 9–23)
CALCIUM BLD-MCNC: 7.9 MG/DL (ref 8.5–10.1)
CHLORIDE SERPL-SCNC: 105 MMOL/L (ref 98–112)
CO2 SERPL-SCNC: 33 MMOL/L (ref 21–32)
CREAT BLD-MCNC: 0.86 MG/DL
EGFRCR SERPLBLD CKD-EPI 2021: 71 ML/MIN/1.73M2 (ref 60–?)
GLUCOSE BLD-MCNC: 87 MG/DL (ref 70–99)
MAGNESIUM SERPL-MCNC: 1.5 MG/DL (ref 1.6–2.6)
OSMOLALITY SERPL CALC.SUM OF ELEC: 297 MOSM/KG (ref 275–295)
POTASSIUM SERPL-SCNC: 3.3 MMOL/L (ref 3.5–5.1)
SODIUM SERPL-SCNC: 142 MMOL/L (ref 136–145)

## 2023-12-05 PROCEDURE — 99232 SBSQ HOSP IP/OBS MODERATE 35: CPT | Performed by: HOSPITALIST

## 2023-12-05 RX ORDER — POTASSIUM CHLORIDE 20 MEQ/1
40 TABLET, EXTENDED RELEASE ORAL ONCE
Status: COMPLETED | OUTPATIENT
Start: 2023-12-05 | End: 2023-12-05

## 2023-12-05 RX ORDER — MULTIPLE VITAMINS W/ MINERALS TAB 9MG-400MCG
1 TAB ORAL DAILY
Status: DISCONTINUED | OUTPATIENT
Start: 2023-12-05 | End: 2023-12-08

## 2023-12-05 RX ORDER — FUROSEMIDE 10 MG/ML
40 INJECTION INTRAMUSCULAR; INTRAVENOUS DAILY
Status: DISCONTINUED | OUTPATIENT
Start: 2023-12-06 | End: 2023-12-06

## 2023-12-05 NOTE — ED QUICK NOTES
Orders for admission, patient is aware of plan and ready to go upstairs. Any questions, please call ED RN Joan at extension [phone].      Patient Covid vaccination status: Fully vaccinated     COVID Test Ordered in ED: Rapid SARS-CoV-2 by PCR    COVID Suspicion at Admission: N/A    Running Infusions:  None    Mental Status/LOC at time of transport: a&ox4    Other pertinent information: from ary Howard, usually ambulatory with walker, on external female catheter, needs UA  CIWA score: N/A   NIH score:  N/A

## 2023-12-05 NOTE — PROGRESS NOTES
NURSING ADMISSION NOTE      Patient admitted via Cart  Oriented to room. Safety precautions initiated. Bed in low position. Call light in reach. Pt A&Ox4 on room air, complaints of arthritis pain mainly to her RT side. But also has limited movement in all extremities due to edema/arthritis pain. Legs were both wrapped in gauze and ace bandage, unwrapped took pictures of legs for chart and then wrapped again with new kirlex and ace bandages and elevated them. Tolerating medications well per mar. Pt from a SNF states she has been working with PT for ambulation but still has trouble and isnt able to do much moving here. Call light within reach, frequent checks made, needs met.

## 2023-12-05 NOTE — ED QUICK NOTES
Pt refused straight cath for urine, pt states she is unable to stand to void in toilet/commode. Pt states she does not want to use a bedpan to provide urine sample. Urine specimen unable to be collected due to this.

## 2023-12-05 NOTE — ED QUICK NOTES
Pt incontinent of urine. Soiled brief removed. Janae care provided. Clean brief and external female catheter placed. Pt repositioned on stretcher for comfort.

## 2023-12-05 NOTE — PROGRESS NOTES
Blane Blackmon.   1950. APRN Encounter 23. (Late entry)    Met with patient at bedside. Discussed LE edema with therapy and staff nurses. Upper and lower extremities with increased edema. Weight 298.2# from 296.4#.   Torsemide increased to 20 mg bid. Patient reports not urinating as much as she did at home on Torsemide. Lymphedema therapy continues with therapist.    LE leg wound slow to heal.  Wound MD following. Xeroform gauze being applied to open lesions. Blister noted on R buttocks; topical secura ointment with turning. Incontinent of B&B >75% of the time per patient. Aquaphor has been ordered for dry skin LE. Patient also has been c/o heartburn; TUMS ordered. Famotidine 20 mg added daily. Ren Jones  : 1950.  68year old  female  is admitted to [address] [place] [address] for rehabilitation and strengthening. [address] [place] [address] Admission:                          23  Discharged to [place] [id number]:   23     Chief complaint: Weight gain; lymphedema, blister R buttocks, dry skin, open wounds LE, Heartburn    HPI:  67 yr old female presented to the emergency department on 2023 with her brother, Jessica Gonzalez who reported her having visual hallucinations. UA  showed a UTI; culture grew Proteus Mirabilis. ID was consulted for antibiotic. Pt was started on IV Rocephin on 23. Patient has been OR X 1 to OR x 4. She reported to staff that she sees dogs in her room. Psychiatry was consulted. Pt's brother Jessica Gonzalez reported hallucinations for about 1 month. She was recently hospitalized from 10/22/2023-10/27/2023 for cellulitis. Multiple hospitalizations previously:  2023, 2023-2023, and 2023-2023 for lymphedema complications. Jessica Gonzalez denied previous hallucinations or any hx of psyche hospitalizations.     Past Medical History:   Diagnosis Date    Cataract     right eye completed; future plan for left eye procceure    Disorder of thyroid 2023 uses synthroid    Essential hypertension     High blood pressure     High cholesterol     Hx of pancreatitis     6 times    Hyperlipidemia     Lymphedema of both lower extremities     longstanding, unknown origin    Muscle weakness     Visual impairment      Past Surgical History:   Procedure Laterality Date    CATARACT      CHOLECYSTECTOMY       Family History   Problem Relation Age of Onset    Hypertension Father     Heart Disorder Paternal Grandfather     Psychiatric Sister     Lipids Brother     Obesity Sister      Social History     Socioeconomic History    Marital status: Single   Tobacco Use    Smoking status: Never    Smokeless tobacco: Never   Vaping Use    Vaping Use: Never used   Substance and Sexual Activity    Alcohol use: Not Currently    Drug use: Not Currently     Social Determinants of Health     Food Insecurity: No Food Insecurity (11/9/2023)    Food Insecurity     Food Insecurity: Never true   Transportation Needs: Unmet Transportation Needs (11/9/2023)    Transportation Needs     Lack of Transportation: Yes   Housing Stability: Low Risk  (11/9/2023)    Housing Stability     Housing Instability: No     IMMUNIZATIONS  Immunization History   Administered Date(s) Administered    Covid-19 Vaccine [employer] (J&J) 0.5ml 04/06/2021, 04/14/2022    FLU VAC High Dose 65 YRS & Older PRSV Free (30097) 11/17/2023     ALLERGIES:  Allergies   Allergen Reactions    Amoxicillin HIVES     Tolerates cefazolin (8/6/21)    Erythromycin HIVES    Penicillins HIVES     Tolerates rocephin in the past, tolerates cefazolin (8/6/21)      Tetracycline HIVES    Alc-Benzyl Alc-Sulfamethoxazole-Trimethoprim RASH    Sulfa Antibiotics RASH    Tetracyclines & Related RASH    Opioid Analgesics NAUSEA ONLY     Morphine       CODE STATUS:  Full Code  ADVANCED CARE PLANNING TEAM: Will need family care plan mtgs to discuss discharge    Therapy Update:  Gait training with rolling walker with emphasis on safety and performance; 15 feet x 2 and w/c follow and verbal cues. Mod assist for sit to stand mobility. CURRENT MEDICATIONS - reviewed and updated on SNF EMAR. Risperidone 1 mg q hs  Atorvastatin 10 mg q hs  Carbamide peroxide 6.5% - 5 gtt both ears 2 x daily  Clear eyes for dry eyes 1-0.25. 1 gtt both eyes  Vitamin B 12  1000 daily  levothyroxine 88 mcg daily  Metoprolol Tartrate 100 qam  Metoprolol Tartrate 50 q hs  Nystatin 1000,000 powder prn   Nasal spray 0.65% sod. chloride prn  Torsemide 20 mg bid   Tramadol 50 q 6 hrs prn  Vit D 2000 daily  Famotidine 20 mg daily     SUBJECTIVE: denies headache, chest pain, SOB. Denies n/v/d/c. C/O leg heaviness and pain lower extremities. C/O increase edema of upper extremities, especially fingers. R hip Xray negative    PHYSICAL EXAM: 155/83. P 80. RR 20. POx 96%. T 98. 2.  wgt 298.2# from 296.4#  admit wgt 300.8#. GENERAL HEALTH:well developed, well nourished, in no apparent distress  + lymphedema and upper extremity edema. LINES, TUBES, DRAINS:  none  SKIN: warm, dry multiple LE open wounds. Wound MD following. WOUND: wound assessment per staff nurses. Wound MD on consult. EYES: Pupils round and equal; no jaundice. conjunctiva normal; + dry eyes  HENT: no nasal drainage, mucous membranes pink, moist, daily ear drops. Daun Comber NECK:supple, FROM  BREAST: deferred exam   RESPIRATORY:lungs clear. CARDIOVASCULAR:  RRR, rate 72. HX A-fib  ABDOMEN:  round, active BS+, soft, nondistended; no guarding, no rebound tenderness. :Deferred recent UTI  LYMPHATIC:lymphedema  severe; diuresing. +open  wounds. MUSCULOSKELETAL: limited mobility d/t edema and LE wounds  EXTREMITIES: increased edema. NEUROLOGIC:follows commands occasional confusion. PSYCHIATRIC: alert and oriented x 3; affect appropriate  and anxious  concerned about lymphedema. MEDICAL DECISION MAKING: brother Santo Holguin assists w/medical decisions    Lab Results:   11/30/23  WBC 7. HGB 9.8. Plts 134. Na 141. K 3.5  (20meq K+ added).  Cl 106 CO2 29. BUN 28. Creat 0.8. GFR >60    Assessment  / Plan    Confusion; UTI resolved  Completed IV antibiotics. Hallucinations  -  Risperidone 1 mg  q hs     Chronic, severe Lymphedema w/open wounds/Hand Edema  Torsemide  20 mg bid   Wound MD on consult  Xeroform gauze to open wounds. Wrap with kerlix and ACE wraps  Wear gripper socks; avoid shoes unless in therapy  PT/OT - lymphedema therapy    Hypokalemia:  K+ 20 meq daily  Trend labs    ROSMERY-resolved     Hypertension Metoprolol tart 100 mg qam and 50 mg q hs    Hypothyroidism - Levothyroxine 88 mcg qam    HL  atorvastatin 10 mg q hs     Morbid obesity  -  BMI, 44.1  Dietician following     Atrial fibrillation - Metroprolol Tart 100 mg qam / 50 mg q hs    Dry Eyes:  carboxymethylcellu/glycerin - 1 gtt both eyes prn    Ear congestion:  carbamide peroxide 6.5%; 5 gtts both ears 2 x day    Chronic pain:  Tramadol 50 mg q 6 hrs prn    Dispo:  Goal is to return home with home health and lymphedema therapist.       FOLLOW UP APPOINTMENTS   *Follow-Up with PCP within 1-2 weeks following [facility] discharge. *Follow-Up with specialists as recommended. Future Appointments   Date Time Provider Naz Lee   12/7/2023  3:45 PM Regis Adame DPM [id number]   *Greater than 35 minutes spent w/ patient and staff, including but not limited to/ reviewing present status, needs, abilities with disciplines, reviewing medical records, vital signs, labs, completing med rec and entering orders to establish plan of care in [facility]. Note to patient: The [facility] makes medical notes like these available to patients in the interest of transparency. However, this is a medical document intended as peer to peer communication. It is written in medical language and may contain abbreviations or verbiage that are unfamiliar. It may appear blunt or direct.  Medical documents are intended to carry relevant information, facts as evident, and the clinical opinion of the practitioner who signs the document.     Luann Rothman, APRN  11/30/23  430 pm  [facility] Post Acute Care Team

## 2023-12-05 NOTE — PROGRESS NOTES
Юлия Urias.   1950. APRN Encounter 23 (late entry)    Patient met at bedside. C/O lower extremity edema with weeping. Dressings changed per staff nurse with assistance to support legs. Upper extremities + edema. Behzad Mueller  : 1950.  68year old  female  is admitted to [address] [place] [address] for rehabilitation and strengthening. [address] [place] [address] Admission:  23  Discharged to [facility] 954:   23     Chief complaint:  UTI resolving; Bilateral severe lymphedema with open wounds lower extremities' stasis Dermatitis    HPI:  67 yr old female presented to the emergency department on 2023 with her brother, Ruy Ferguson who reported her having visual hallucinations. UA  showed a UTI; culture grew Proteus Mirabilis. ID was consulted for antibiotic. Pt was started on IV Rocephin on 23. Patient has been OR X 1 to OR x 4. She reported to staff that she sees dogs in her room. Psychiatry was consulted. Pt's brother Ruy Ferguson reported hallucinations for about 1 month. She was recently hospitalized from 10/22/2023-10/27/2023 for cellulitis. Multiple hospitalizations previously:  2023, 2023-2023, and 2023-2023 for lymphedema complications. Ruy Ferguson denied previous hallucinations or any hx of psyche hospitalizations.     Past Medical History:   Diagnosis Date    Cataract     right eye completed; future plan for left eye procceure    Disorder of thyroid 2023    uses synthroid    Essential hypertension     High blood pressure     High cholesterol     Hx of pancreatitis     6 times    Hyperlipidemia     Lymphedema of both lower extremities     longstanding, unknown origin    Muscle weakness     Visual impairment      Past Surgical History:   Procedure Laterality Date    CATARACT      CHOLECYSTECTOMY       Family History   Problem Relation Age of Onset    Hypertension Father     Heart Disorder Paternal Grandfather     Psychiatric Sister     Lipids Brother Obesity Sister      Social History     Socioeconomic History    Marital status: Single   Tobacco Use    Smoking status: Never    Smokeless tobacco: Never   Vaping Use    Vaping Use: Never used   Substance and Sexual Activity    Alcohol use: Not Currently    Drug use: Not Currently     Social Determinants of Health     Food Insecurity: No Food Insecurity (11/9/2023)    Food Insecurity     Food Insecurity: Never true   Transportation Needs: Unmet Transportation Needs (11/9/2023)    Transportation Needs     Lack of Transportation: Yes   Housing Stability: Low Risk  (11/9/2023)    Housing Stability     Housing Instability: No     IMMUNIZATIONS  Immunization History   Administered Date(s) Administered    Covid-19 Vaccine [employer] (J&J) 0.5ml 04/06/2021, 04/14/2022    FLU VAC High Dose 65 YRS & Older PRSV Free (10863) 11/17/2023     ALLERGIES:  Allergies   Allergen Reactions    Amoxicillin HIVES     Tolerates cefazolin (8/6/21)    Erythromycin HIVES    Penicillins HIVES     Tolerates rocephin in the past, tolerates cefazolin (8/6/21)      Tetracycline HIVES    Alc-Benzyl Alc-Sulfamethoxazole-Trimethoprim RASH    Sulfa Antibiotics RASH    Tetracyclines & Related RASH    Opioid Analgesics NAUSEA ONLY     Morphine       CODE STATUS:  Full Code  ADVANCED CARE PLANNING TEAM: Will need family care plan mtgs to discuss discharge    Therapy Update:  Gait training with rolling walker with emphasis on safety and performance; 15 feet x 2 and w/c follow and verbal cues. Mod assist for sit to stand mobility. CURRENT MEDICATIONS - reviewed and updated on SNF EMAR. Risperidone 1 mg q hs  Atorvastatin 10 mg q hs  Carbamide peroxide 6.5% - 5 gtt both ears 2 x daily  Clear eyes for dry eyes 1-0.25. 1 gtt both eyes  Vitamin B 12  1000 daily  levothyroxine 88 mcg daily  Metoprolol Tartrate 100 qam  Metoprolol Tartrate 50 q hs  Nystatin 1000,000 powder prn   Nasal spray 0.65% sod. chloride prn  Torsemide 40 mg daily  Tramadol 50 q 6 hrs prn  Vit D 2000 daily     SUBJECTIVE: denies headache, chest pain, SOB. Denies n/v/d/c. C/O leg heaviness and pain lower extremities with weeping. R leg and hip pain. Xray negative    PHYSICAL EXAM:   159/87. P 72 RR 18. POx 95%. T 97. 8. weight 296.4#  admission weight  300.8#. GENERAL HEALTH:well developed, well nourished, in no apparent distress  + weeping LE.  LINES, TUBES, DRAINS:  none  SKIN: warm, dry multiple LE open wounds w/weeping. WOUND: wound assessment per staff nurses. Wound MD on consult. EYES: Pupils round and equal; no jaundice. conjunctiva normal; + dry eyes  HENT: no nasal drainage, mucous membranes pink, moist, daily ear drops. Adelaidan Mckayla NECK:supple, FROM  BREAST: deferred exam   RESPIRATORY:lungs clear. CARDIOVASCULAR:  RRR, rate 72. HX A-fib  ABDOMEN:  round, active BS+, soft, nondistended; no guarding, no rebound tenderness. :Deferred recent UTI  LYMPHATIC:lymphedema  severe with weeping and open  wounds. MUSCULOSKELETAL: limited mobility d/t edema and LE wounds  EXTREMITIES: + edema. Weeping R & L LE.  redness posterior knee. NEUROLOGIC:follows commands  PSYCHIATRIC: alert and oriented x 3; affect appropriate  and anxious     MEDICAL DECISION MAKING: brother Ruy Ferguson assists w/medical decisions    Lab Results:  WBC 5.6. HGB 11.2. Plts 137. Na 144. K 3.6. Weekly labs. Wound MD to follow    Assessment  / Plan    Confusion secondary to UTI resolved  Completed IV antibiotics.       Hallucinations  Risperidone 1 mg  q hs     Chronic, severe Lymphedema with weeping open wounds  Torsemide daily  Wound MD on consult  Wrap with kerlix and ACE wraps  Wear gripper socks; avoid shoes unless in therapy  PT/OT - lymphedema therapy    ROSMERY-resolved     Hypertension Metoprolol tart 100 mg qam and 50 mg q hs    Hypothyroidism - Levothyroxine 88 mcg qam    HL  atorvastatin 10 mg q hs     Morbid obesity  -  BMi, 44.1  Dietician following     Atrial fibrillation - Metroprolol Tart 100 mg qam / 50 mg q hs    Dry Eyes:  carboxymethylcellu/glycerin - 1 gtt both eyes prn    Ear congestion:  carbamide peroxide 6.5%; 5 gtts both ears 2 x day    Chronic pain:  Tramadol 50 mg q 6 hrs prn    Dispo:  Goal is to return home with home health and lymphedema therapist.       [address] [facility] with PCP within 1-2 weeks following [facility] discharge. *Follow-Up with specialists as recommended. Future Appointments   Date Time Provider Naz Rosa   12/7/2023  3:45 PM Tessy Adame, COREY [id number]   *Greater than 35 minutes spent w/ patient and staff, including but not limited to/ reviewing present status, needs, abilities with disciplines, reviewing medical records, vital signs, labs, completing med rec and entering orders to establish plan of care in [facility]. Note to patient: The [facility] makes medical notes like these available to patients in the interest of transparency. However, this is a medical document intended as peer to peer communication. It is written in medical language and may contain abbreviations or verbiage that are unfamiliar. It may appear blunt or direct. Medical documents are intended to carry relevant information, facts as evident, and the clinical opinion of the practitioner who signs the document.     Jeanne Ogden, APRN  11/25/23   345 pm  [facility] Post Acute Care Team

## 2023-12-05 NOTE — OCCUPATIONAL THERAPY NOTE
OT order received, chart reviewed, eval att'd. Pt refused OOB activity.  Will re-attempt as pt agreeable and schedule permitting

## 2023-12-06 ENCOUNTER — APPOINTMENT (OUTPATIENT)
Dept: CV DIAGNOSTICS | Facility: HOSPITAL | Age: 73
End: 2023-12-06
Attending: HOSPITALIST
Payer: MEDICARE

## 2023-12-06 PROBLEM — E87.6 HYPOKALEMIA: Status: ACTIVE | Noted: 2023-12-06

## 2023-12-06 PROBLEM — E83.42 HYPOMAGNESEMIA: Status: ACTIVE | Noted: 2023-12-06

## 2023-12-06 LAB
ANION GAP SERPL CALC-SCNC: 7 MMOL/L (ref 0–18)
BUN BLD-MCNC: 17 MG/DL (ref 9–23)
CALCIUM BLD-MCNC: 8.3 MG/DL (ref 8.5–10.1)
CHLORIDE SERPL-SCNC: 104 MMOL/L (ref 98–112)
CO2 SERPL-SCNC: 31 MMOL/L (ref 21–32)
CREAT BLD-MCNC: 0.76 MG/DL
EGFRCR SERPLBLD CKD-EPI 2021: 83 ML/MIN/1.73M2 (ref 60–?)
GLUCOSE BLD-MCNC: 95 MG/DL (ref 70–99)
MAGNESIUM SERPL-MCNC: 2.4 MG/DL (ref 1.6–2.6)
OSMOLALITY SERPL CALC.SUM OF ELEC: 295 MOSM/KG (ref 275–295)
POTASSIUM SERPL-SCNC: 4 MMOL/L (ref 3.5–5.1)
POTASSIUM SERPL-SCNC: 4 MMOL/L (ref 3.5–5.1)
SODIUM SERPL-SCNC: 142 MMOL/L (ref 136–145)

## 2023-12-06 PROCEDURE — 93306 TTE W/DOPPLER COMPLETE: CPT | Performed by: HOSPITALIST

## 2023-12-06 PROCEDURE — 99232 SBSQ HOSP IP/OBS MODERATE 35: CPT | Performed by: HOSPITALIST

## 2023-12-06 RX ORDER — FUROSEMIDE 10 MG/ML
60 INJECTION INTRAMUSCULAR; INTRAVENOUS
Status: DISCONTINUED | OUTPATIENT
Start: 2023-12-06 | End: 2023-12-07

## 2023-12-06 NOTE — CM/SW NOTE
SW noted that patient admitted from [address] [place] [address]. SW sent updated clinical information to [address] [place] [address] to confirm if patient is able to return at DC. SW will continue to follow for plan of care changes and remain available for any additional DC needs or concerns.      Radha JASON, [facility]  Discharge Planner   [id number]

## 2023-12-06 NOTE — PLAN OF CARE
Pt a/o x4. VSS on RA. C/o mild pain, declining pain med. Extremities swollen, limited movement. Elevation encouraged. Meds per MAR. Potassium replaced per protocol. Fall risk protocol followed, call light within reach.

## 2023-12-07 PROBLEM — R60.1 ANASARCA: Status: ACTIVE | Noted: 2023-12-07

## 2023-12-07 LAB
ANION GAP SERPL CALC-SCNC: 5 MMOL/L (ref 0–18)
BILIRUB UR QL STRIP.AUTO: NEGATIVE
BUN BLD-MCNC: 20 MG/DL (ref 9–23)
CALCIUM BLD-MCNC: 8.7 MG/DL (ref 8.5–10.1)
CHLORIDE SERPL-SCNC: 104 MMOL/L (ref 98–112)
CLARITY UR REFRACT.AUTO: CLEAR
CO2 SERPL-SCNC: 29 MMOL/L (ref 21–32)
CREAT BLD-MCNC: 0.86 MG/DL
EGFRCR SERPLBLD CKD-EPI 2021: 71 ML/MIN/1.73M2 (ref 60–?)
GLUCOSE BLD-MCNC: 92 MG/DL (ref 70–99)
GLUCOSE UR STRIP.AUTO-MCNC: NORMAL MG/DL
KETONES UR STRIP.AUTO-MCNC: NEGATIVE MG/DL
LEUKOCYTE ESTERASE UR QL STRIP.AUTO: 75
MAGNESIUM SERPL-MCNC: 2.1 MG/DL (ref 1.6–2.6)
NITRITE UR QL STRIP.AUTO: NEGATIVE
OSMOLALITY SERPL CALC.SUM OF ELEC: 288 MOSM/KG (ref 275–295)
PH UR STRIP.AUTO: 7.5 [PH] (ref 5–8)
POTASSIUM SERPL-SCNC: 4.1 MMOL/L (ref 3.5–5.1)
PROT UR STRIP.AUTO-MCNC: NEGATIVE MG/DL
RBC UR QL AUTO: NEGATIVE
SODIUM SERPL-SCNC: 138 MMOL/L (ref 136–145)
SP GR UR STRIP.AUTO: 1.01 (ref 1–1.03)
UROBILINOGEN UR STRIP.AUTO-MCNC: NORMAL MG/DL

## 2023-12-07 PROCEDURE — 99232 SBSQ HOSP IP/OBS MODERATE 35: CPT | Performed by: HOSPITALIST

## 2023-12-07 RX ORDER — FUROSEMIDE 10 MG/ML
80 INJECTION INTRAMUSCULAR; INTRAVENOUS
Status: DISCONTINUED | OUTPATIENT
Start: 2023-12-07 | End: 2023-12-08

## 2023-12-07 NOTE — CONSULTS
[facility]  Report of Inpatient Wound Care Consultation    Sia Jha Patient Status:  Inpatient    1950 MRN [id number]   [facility] 4NW-A Attending Sallie Donovan MD   Hosp Day # 3 PCP Jillian Albert     Reason for Consultation:  Bilateral lower extremities    History of Present Illness:  Sia Jha is a a(n) 68year old female. Kurt Dubin presents with hx of lymphedema to bilateral lower extremities. No open wound at this time    Past Medical History:   Diagnosis Date    Cataract     right eye completed; future plan for left eye procceure    Disorder of thyroid 2023    uses synthroid    Essential hypertension     High blood pressure     High cholesterol     Hx of pancreatitis     6 times    Hyperlipidemia     Lymphedema of both lower extremities     longstanding, unknown origin    Muscle weakness     Visual impairment      Past Surgical History:   Procedure Laterality Date    CATARACT      CHOLECYSTECTOMY        reports that she has never smoked. She has never used smokeless tobacco. She reports that she does not currently use alcohol. She reports that she does not currently use drugs.     Allergies:  @ALLERGY    Laboratory Data:  Lab Results   Component Value Date    CREATSERUM 0.86 2023    BUN 20 2023     2023    K 4.1 2023     2023    CO2 29.0 2023    GLU 92 2023    CA 8.7 2023    MG 2.1 2023         Impression:  Wound 23 Tibial Left (Active)   Date First Assessed/Time First Assessed: 23 1355   Location: Tibial  Wound Location Orientation: Left      Assessments 2023  9:32 AM   Wound Image     Drainage Amount None   Wound Length (cm) 0 cm   Wound Width (cm) 0 cm   Wound Surface Area (cm^2) 0 cm^2   Wound Depth (cm) 0 cm   Wound Volume (cm^3) 0 cm^3   Wound Healing % 100   Wound Bed Epithelium (%) 100 %   Wound Odor None       Wound 23 Tibial Right (Active)   Date First Assessed/Time First Assessed: 05/30/23 1356   Location: Tibial  Wound Location Orientation: Right      Assessments 12/7/2023  9:32 AM   Wound Image     Wound Length (cm) 0 cm   Wound Width (cm) 0 cm   Wound Surface Area (cm^2) 0 cm^2   Wound Depth (cm) 0 cm   Wound Volume (cm^3) 0 cm^3   Wound Healing % 100   Wound Bed Epithelium (%) 100 %   Wound Odor None       Wound 12/04/23 Foot Dorsal;Right (Active)   Date First Assessed/Time First Assessed: 12/04/23 2252   Location: Foot  Wound Location Orientation: Dorsal;Right      Assessments 12/7/2023  9:34 AM   Wound Image     Drainage Amount None   Wound Length (cm) 0 cm   Wound Width (cm) 0 cm   Wound Surface Area (cm^2) 0 cm^2   Wound Depth (cm) 0 cm   Wound Volume (cm^3) 0 cm^3   Wound Bed Epithelium (%) 100 %   State of Healing Epithelialized (fragile)   Wound Odor None      Compression Therapy:   Spandagrip and Elevate leg(s) as much as possible. Ensure the tubigrip is at the base of the toes to below the knee area at all times. See flow sheet on edema for lower extremity assessment     Miscellaneous/Additional Orders:  Offloading: Turn Q 2 hours and as needed, waffle cushion      Care Summary: Discussed Plan of Care at beside with patient. Patient verbally acknowledges understanding of all instructions and all questions were answered. Recommendations:  Continue to follow up with lymphedema therapy if discharged to home/skilled facility    Thank you for allowing me to participate in the care of your patient. Time Spent 30 minutes , Thank you.     Eli Melara RN, BSN [facility]   Wound Care pager [phone]  12/7/2023  9:57 AM

## 2023-12-07 NOTE — PLAN OF CARE
Pt c/o pain when turning/moving. At rest pt is comfortable. No other change when compared to prior night. Fall risk protocol followed, call light within reach.      Problem: METABOLIC/FLUID AND ELECTROLYTES - ADULT  Goal: Electrolytes maintained within normal limits  Description: INTERVENTIONS:  - Monitor labs and rhythm and assess patient for signs and symptoms of electrolyte imbalances  - Administer electrolyte replacement as ordered  - Monitor response to electrolyte replacements, including rhythm and repeat lab results as appropriate  - Fluid restriction as ordered  - Instruct patient on fluid and nutrition restrictions as appropriate  Outcome: Progressing

## 2023-12-07 NOTE — PLAN OF CARE
Alert and oriented, was anxious and tearful this morning, was up with PT, patient was scared sitting on the recliner with legs elevated, afraid that something is going to happen. Scared of being alone. Both legs have severe lymphedema, redness onto the top of the right foot (an old blister that opened up) area is dry, no drainage noted, +4 pedal edema, skin is dry and has few bruises, left leg has scattered erythema, no skin breakdown noted, Redressed both legs with kerlix and applied ace wrap, Sat on the chair for several hours, managed to pivot to go back to bed, was given Furosemide IV, diuresing large amount of urine, has an external catheter in place. Both arm with some swelling, no redness noted, has limited ROM but manages to feed herself with set up assist.    Problem: CARDIOVASCULAR - ADULT  Goal: Maintains optimal cardiac output and hemodynamic stability  Description: INTERVENTIONS:  - Monitor vital signs, rhythm, and trends  - Monitor for bleeding, hypotension and signs of decreased cardiac output  - Evaluate effectiveness of vasoactive medications to optimize hemodynamic stability  - Monitor arterial and/or venous puncture sites for bleeding and/or hematoma  - Assess quality of pulses, skin color and temperature  - Assess for signs of decreased coronary artery perfusion - ex.  Angina  - Evaluate fluid balance, assess for edema, trend weights  Outcome: Progressing     Problem: METABOLIC/FLUID AND ELECTROLYTES - ADULT  Goal: Electrolytes maintained within normal limits  Description: INTERVENTIONS:  - Monitor labs and rhythm and assess patient for signs and symptoms of electrolyte imbalances  - Administer electrolyte replacement as ordered  - Monitor response to electrolyte replacements, including rhythm and repeat lab results as appropriate  - Fluid restriction as ordered  - Instruct patient on fluid and nutrition restrictions as appropriate  Outcome: Progressing

## 2023-12-08 VITALS
BODY MASS INDEX: 46 KG/M2 | HEART RATE: 61 BPM | OXYGEN SATURATION: 100 % | SYSTOLIC BLOOD PRESSURE: 124 MMHG | DIASTOLIC BLOOD PRESSURE: 53 MMHG | WEIGHT: 258.88 LBS | RESPIRATION RATE: 16 BRPM | TEMPERATURE: 98 F

## 2023-12-08 LAB
ANION GAP SERPL CALC-SCNC: 5 MMOL/L (ref 0–18)
BUN BLD-MCNC: 21 MG/DL (ref 9–23)
CALCIUM BLD-MCNC: 8.5 MG/DL (ref 8.5–10.1)
CHLORIDE SERPL-SCNC: 102 MMOL/L (ref 98–112)
CO2 SERPL-SCNC: 32 MMOL/L (ref 21–32)
CREAT BLD-MCNC: 0.91 MG/DL
EGFRCR SERPLBLD CKD-EPI 2021: 67 ML/MIN/1.73M2 (ref 60–?)
GLUCOSE BLD-MCNC: 101 MG/DL (ref 70–99)
OSMOLALITY SERPL CALC.SUM OF ELEC: 291 MOSM/KG (ref 275–295)
POTASSIUM SERPL-SCNC: 3.5 MMOL/L (ref 3.5–5.1)
SODIUM SERPL-SCNC: 139 MMOL/L (ref 136–145)

## 2023-12-08 PROCEDURE — 99238 HOSP IP/OBS DSCHRG MGMT 30/<: CPT | Performed by: HOSPITALIST

## 2023-12-08 RX ORDER — POTASSIUM CHLORIDE 1.5 G/1.58G
20 POWDER, FOR SOLUTION ORAL EVERY OTHER DAY
Qty: 15 PACKET | Refills: 0 | Status: SHIPPED | OUTPATIENT
Start: 2023-12-08

## 2023-12-08 RX ORDER — LEVOTHYROXINE SODIUM 0.1 MG/1
100 TABLET ORAL
Qty: 30 TABLET | Refills: 0 | Status: SHIPPED | OUTPATIENT
Start: 2023-12-09

## 2023-12-08 RX ORDER — TRAMADOL HYDROCHLORIDE 50 MG/1
50 TABLET ORAL EVERY 8 HOURS PRN
Qty: 20 TABLET | Refills: 0 | Status: SHIPPED | OUTPATIENT
Start: 2023-12-08

## 2023-12-08 NOTE — PHYSICAL THERAPY NOTE
Chart reviewed and attempted to see pt for PT treatment, however pt currently being DC to [facility] at this time.  KINGA

## 2023-12-08 NOTE — PLAN OF CARE
Received pt alert and orientated x4. VSS. No sob on RA. Afebrile. No c/o pain. All meds given per [facility]. Tolerating diet. Voiding after receiving lasix. Fall precautions in place. Call light within reach. Plan to DC later today. 1530: pt cleared for DC. IV removed. Report called to SNF. All pt belongings gathered and sent with the pt. Paperwork given to [facility]. NURSING DISCHARGE NOTE    Discharged Nursing home via Ambulance. Accompanied by Support staff  Belongings Taken by patient/family. Problem: CARDIOVASCULAR - ADULT  Goal: Maintains optimal cardiac output and hemodynamic stability  Description: INTERVENTIONS:  - Monitor vital signs, rhythm, and trends  - Monitor for bleeding, hypotension and signs of decreased cardiac output  - Evaluate effectiveness of vasoactive medications to optimize hemodynamic stability  - Monitor arterial and/or venous puncture sites for bleeding and/or hematoma  - Assess quality of pulses, skin color and temperature  - Assess for signs of decreased coronary artery perfusion - ex.  Angina  - Evaluate fluid balance, assess for edema, trend weights  Outcome: Progressing     Problem: METABOLIC/FLUID AND ELECTROLYTES - ADULT  Goal: Electrolytes maintained within normal limits  Description: INTERVENTIONS:  - Monitor labs and rhythm and assess patient for signs and symptoms of electrolyte imbalances  - Administer electrolyte replacement as ordered  - Monitor response to electrolyte replacements, including rhythm and repeat lab results as appropriate  - Fluid restriction as ordered  - Instruct patient on fluid and nutrition restrictions as appropriate  Outcome: Progressing

## 2023-12-08 NOTE — PLAN OF CARE
C/o arthritis pain, prn given. Pt turned & repositioned. Some redness/skin breakdown noted on buttocks, mepilex applied. No other changes. Fall risk protocol followed, call light within reach.      Problem: METABOLIC/FLUID AND ELECTROLYTES - ADULT  Goal: Electrolytes maintained within normal limits  Description: INTERVENTIONS:  - Monitor labs and rhythm and assess patient for signs and symptoms of electrolyte imbalances  - Administer electrolyte replacement as ordered  - Monitor response to electrolyte replacements, including rhythm and repeat lab results as appropriate  - Fluid restriction as ordered  - Instruct patient on fluid and nutrition restrictions as appropriate  Outcome: Progressing

## 2023-12-08 NOTE — CM/SW NOTE
12/08/23 1000   Discharge disposition   Expected discharge disposition subacute   Discharge transportation Rosa Mlizeth Jose M [address] noted that patient is medically cleared for DC today. SW confirmed with [address] that they are able to re-accept patient today. [facility] [phone] has been requested to arrange ambulance for  time of 2pm. PCS form completed. [phone]    SW will continue to follow for plan of care changes and remain available for any additional DC needs or concerns.      Rosita Parker MSW, [facility]  Discharge Planner   [id number]

## 2023-12-08 NOTE — PLAN OF CARE
Received pt alert and orientated x4. VSS. No sob on RA. Afebrile. No c/o pain. Pt feeling anxious. Legs wrapped and elevated. All meds given per [facility]. Tolerating diet. Voiding. Fall precautions in place. Call light within reach. Problem: CARDIOVASCULAR - ADULT  Goal: Maintains optimal cardiac output and hemodynamic stability  Description: INTERVENTIONS:  - Monitor vital signs, rhythm, and trends  - Monitor for bleeding, hypotension and signs of decreased cardiac output  - Evaluate effectiveness of vasoactive medications to optimize hemodynamic stability  - Monitor arterial and/or venous puncture sites for bleeding and/or hematoma  - Assess quality of pulses, skin color and temperature  - Assess for signs of decreased coronary artery perfusion - ex.  Angina  - Evaluate fluid balance, assess for edema, trend weights  Outcome: Progressing     Problem: METABOLIC/FLUID AND ELECTROLYTES - ADULT  Goal: Electrolytes maintained within normal limits  Description: INTERVENTIONS:  - Monitor labs and rhythm and assess patient for signs and symptoms of electrolyte imbalances  - Administer electrolyte replacement as ordered  - Monitor response to electrolyte replacements, including rhythm and repeat lab results as appropriate  - Fluid restriction as ordered  - Instruct patient on fluid and nutrition restrictions as appropriate  Outcome: Progressing

## 2023-12-12 ENCOUNTER — SNF VISIT (OUTPATIENT)
Dept: INTERNAL MEDICINE CLINIC | Age: 73
End: 2023-12-12

## 2023-12-12 DIAGNOSIS — I89.0 LYMPHEDEMA OF BOTH LOWER EXTREMITIES: Primary | ICD-10-CM

## 2023-12-12 DIAGNOSIS — G89.29 CHRONIC PAIN OF BOTH SHOULDERS: ICD-10-CM

## 2023-12-12 DIAGNOSIS — M62.81 MUSCLE WEAKNESS: ICD-10-CM

## 2023-12-12 DIAGNOSIS — E07.9 DISORDER OF THYROID: ICD-10-CM

## 2023-12-12 DIAGNOSIS — M25.512 CHRONIC PAIN OF BOTH SHOULDERS: ICD-10-CM

## 2023-12-12 DIAGNOSIS — T14.8XXD WOUND HEALING, DELAYED: ICD-10-CM

## 2023-12-12 DIAGNOSIS — E87.6 HYPOKALEMIA: ICD-10-CM

## 2023-12-12 DIAGNOSIS — R60.0 BILATERAL LEG EDEMA: ICD-10-CM

## 2023-12-12 DIAGNOSIS — M25.511 CHRONIC PAIN OF BOTH SHOULDERS: ICD-10-CM

## 2023-12-12 DIAGNOSIS — R53.81 PHYSICAL DECONDITIONING: ICD-10-CM

## 2023-12-12 PROCEDURE — 99310 SBSQ NF CARE HIGH MDM 45: CPT | Performed by: NURSE PRACTITIONER

## 2023-12-17 VITALS
HEART RATE: 74 BPM | DIASTOLIC BLOOD PRESSURE: 74 MMHG | SYSTOLIC BLOOD PRESSURE: 132 MMHG | WEIGHT: 252.38 LBS | OXYGEN SATURATION: 99 % | RESPIRATION RATE: 16 BRPM | BODY MASS INDEX: 45 KG/M2 | TEMPERATURE: 98 F

## 2023-12-17 RX ORDER — FAMOTIDINE 20 MG/1
20 TABLET, FILM COATED ORAL DAILY
COMMUNITY

## 2023-12-17 RX ORDER — ACETAMINOPHEN 500 MG
1000 TABLET ORAL EVERY 8 HOURS PRN
COMMUNITY

## 2023-12-18 NOTE — PROGRESS NOTES
Tonia Ramirez.   1950. APRN Encounter 23. Readmission from [facility]     Patient seen at bedside with significant weight loss since recent hospitalization for generalized edema from lymphedema and protein deficiency and stasis from obesity. Iesha Bonilla  : 1950 . 68year old  female patient is admitted to [address] [place] [address], medication management, local wound care and lymphedema therapy. [facility] Admission:       23  Discharged to [address] [place] [address] Street:   23    Chief complaint:  LE lymphedema, hypokalemia, HTN    Discharge Diagnoses:  Generalized Edema; d/t combination of lymphedema and protein deficiency and stasis from obesity. Hypokalemia  Hypomagnesemia  HTN  HLD  HX: DVT - off  A/C  Metabolic alkalosis    HPI  Patient admitted to [facility] for generalized edema; Seems to be due to combination of lymphedema and protein deficiency and stasis from obesity. Patient improved with iv diuresis. Oral diuretics were increased on discharge. No signs of active infection.      Past Medical History:   Diagnosis Date    Cataract     right eye completed; future plan for left eye procceure    Disorder of thyroid 2023    uses synthroid    Essential hypertension     High blood pressure     High cholesterol     Hx of pancreatitis     6 times    Hyperlipidemia     Lymphedema of both lower extremities     longstanding, unknown origin    Muscle weakness     Visual impairment      Past Surgical History:   Procedure Laterality Date    CATARACT      CHOLECYSTECTOMY       Family History   Problem Relation Age of Onset    Hypertension Father     Heart Disorder Paternal Grandfather     Psychiatric Sister     Lipids Brother     Obesity Sister      Social History     Socioeconomic History    Marital status: Single   Tobacco Use    Smoking status: Never    Smokeless tobacco: Never   Vaping Use    Vaping Use: Never used   Substance and Sexual Activity    Alcohol use: Not Currently    Drug use: Not Currently     Social Determinants of Health     Food Insecurity: No Food Insecurity (12/4/2023)    Food Insecurity     Food Insecurity: Never true   Transportation Needs: No Transportation Needs (12/4/2023)    Transportation Needs     Lack of Transportation: No   Recent Concern: Transportation Needs - Unmet Transportation Needs (11/9/2023)    Transportation Needs     Lack of Transportation: Yes   Housing Stability: Low Risk  (12/4/2023)    Housing Stability     Housing Instability: No     IMMUNIZATIONS  Immunization History   Administered Date(s) Administered    Covid-19 Vaccine Rexter (J&J) 0.5ml 04/06/2021, 04/14/2022    FLU VAC High Dose 65 YRS & Older PRSV Free (81647) 11/17/2023      ALLERGIES:  Allergies   Allergen Reactions    Amoxicillin HIVES     Tolerates cefazolin (8/6/21)    Erythromycin HIVES    Penicillins HIVES     Tolerates rocephin in the past, tolerates cefazolin (8/6/21)      Tetracycline HIVES    Alc-Benzyl Alc-Sulfamethoxazole-Trimethoprim RASH    Sulfa Antibiotics RASH    Tetracyclines & Related RASH    Opioid Analgesics NAUSEA ONLY     Morphine       CODE STATUS:  Full Code    ADVANCED CARE PLANNING TEAM: Will need family care plan updates to arrange safe discharge. PT UPDATE:  gait training with rolling walker ambulating 25 ft x 1 and 15 ft x 2. Unsteady knees; poor balance.     CURRENT MEDICATIONS - reviewed and updated on SNF [facility]  Levothyroxine 100 mcg qam  Torsemide 40 am and pm  Tramadol 50 q 8 hrs prn  Atorvastatin 10 mg q hs  Carbamide peroxide 6.5% - 5 gtt both ears 2 x daily  Clear eyes for dry eyes; 1 gtt both eyes prn daily  Cyanocobalamin 1000 mcg daily  Gabapentin 100 mg daily  Metoprolol 100 qam; 50 mg q hs  Nystatin 100,000 powder to affected areas daily  Risperidone 0.5 mg (1 mg) q hs  Secura skin care topically prn  Sodium chloride 0.65%-1 spray nasally for congestion  Vit D 2000 units daily     SUBJECTIVE:  Denies chest pain, SOB, nausea, vomiting, diarrhea; c/o weakness and deconditioning. PHYSICAL EXAM: 132/74. P 74. RR 16 POx 99%. T 97. 8. wgt 252.4# from 298.2#  GENERAL HEALTH:well developed, well nourished, in no apparent distress   LINES, TUBES, DRAINS:  none  SKIN: warm, dry R & L hip non-pressure wounds. WOUND: see wound assessment per staff nurses,   EYES: Pupils round and equal; no jaundice. + dry eyes; no drainage from eyes  HENT: no nasal drainage, mucous membranes;daily ear drops. NECK:supple. FROM  BREAST: deferred exam   RESPIRATORY:lungs clear  CARDIOVASCULAR.  RRR, rate 74:   ABDOMEN: BS+, soft, nondistended; no guarding. Flori Jointer  bilateral  MUSCULOSKELETAL:  weakness, tires in therapy  EXTREMITIES/VASCULAR:upper and lower extremity edema improved  NEUROLOGIC:follows commands  PSYCHIATRIC: alert and oriented x 3; affect appropriate     MEDICAL DECISION MAKING: able to make own decisions. Lab Results: 12/12/23  WBC 8.7. HGB 11. Plts 191. Na 143. K 4.2. Cl 104. CO2 29. BUN 28. Creat 1.03. gfr 53. Assessment / Plan    Bilateral LE Lymphedema  Torsemide 40 mg 2 x day  Lymphedema Therapy  PT/OT  Compression wraps    Hypothyroidism  Levothyroxine 100 daily before breakfast    Hypokalemia  K+ 20 meq daily  Trend labs    HL:  atorvastatin 10 mg q hs    R Hip and L Hip nonpressure wounds  Wound MD following  Xeroform gauze 3 x week    GI propohylaxis  Famotidine 20 mg daily    Pain   Gabapentin 100 mg daily  Tylenol 1000mg q 8 hrs prn  Tramadol 50 mg q 8 hrs prn     HTN  Metoprolol tartrate 100 mg qam  Metoprolol tartrate 50 mg q hs    Vitamin Supp  Vit B-12 1000 mcg daily  Vitamin D 2000 UT daily    Excess Cerumen  Carbamide peroxide 6.5% solution  5 gtt both ears twice daily    Weakness/ decondition  PT/OT  Lymphedema    Dispo:  short term rehab    FOLLOW UP APPOINTMENTS   *Follow-Up with PCP within 1-2 weeks following BRANDYN discharge. *Follow-Up with specialists as recommended.     *Greater than 45 minutes spent w/ patient and staff, including but not limited to/ reviewing present status, needs, abilities with disciplines, reviewing medical records, vital signs, labs, completing med rec and entering orders to establish plan of care in [facility] Note to patient: The [facility] makes medical notes like these available to patients in the interest of transparency. However, this is a medical document intended as peer to peer communication. It is written in medical language and may contain abbreviations or verbiage that are unfamiliar. It may appear blunt or direct. Medical documents are intended to carry relevant information, facts as evident, and the clinical opinion of the practitioner who signs the document.     MARCELL Julio  12/12/23  245 pm  [facility] Post Acute Care Team

## 2023-12-19 ENCOUNTER — SNF VISIT (OUTPATIENT)
Dept: INTERNAL MEDICINE CLINIC | Age: 73
End: 2023-12-19

## 2023-12-19 DIAGNOSIS — I89.0 LYMPHEDEMA OF BOTH LOWER EXTREMITIES: Primary | ICD-10-CM

## 2023-12-19 DIAGNOSIS — M25.512 CHRONIC PAIN OF BOTH SHOULDERS: ICD-10-CM

## 2023-12-19 DIAGNOSIS — G89.29 CHRONIC PAIN OF BOTH SHOULDERS: ICD-10-CM

## 2023-12-19 DIAGNOSIS — R60.0 BILATERAL LEG EDEMA: ICD-10-CM

## 2023-12-19 DIAGNOSIS — M62.81 MUSCLE WEAKNESS: ICD-10-CM

## 2023-12-19 DIAGNOSIS — M25.511 CHRONIC PAIN OF BOTH SHOULDERS: ICD-10-CM

## 2023-12-19 DIAGNOSIS — E87.6 HYPOKALEMIA: ICD-10-CM

## 2023-12-19 DIAGNOSIS — R53.81 PHYSICAL DECONDITIONING: ICD-10-CM

## 2023-12-19 PROCEDURE — 99309 SBSQ NF CARE MODERATE MDM 30: CPT | Performed by: NURSE PRACTITIONER

## 2023-12-20 VITALS
BODY MASS INDEX: 42 KG/M2 | WEIGHT: 235 LBS | SYSTOLIC BLOOD PRESSURE: 126 MMHG | OXYGEN SATURATION: 87 % | HEART RATE: 57 BPM | TEMPERATURE: 98 F | DIASTOLIC BLOOD PRESSURE: 75 MMHG | RESPIRATION RATE: 18 BRPM

## 2023-12-21 ENCOUNTER — SNF VISIT (OUTPATIENT)
Dept: INTERNAL MEDICINE CLINIC | Age: 73
End: 2023-12-21

## 2023-12-21 DIAGNOSIS — G89.29 CHRONIC PAIN OF BOTH SHOULDERS: ICD-10-CM

## 2023-12-21 DIAGNOSIS — R60.0 BILATERAL LEG EDEMA: ICD-10-CM

## 2023-12-21 DIAGNOSIS — I89.0 LYMPHEDEMA OF BOTH LOWER EXTREMITIES: Primary | ICD-10-CM

## 2023-12-21 DIAGNOSIS — R26.9 GAIT ABNORMALITY: ICD-10-CM

## 2023-12-21 DIAGNOSIS — M25.511 CHRONIC PAIN OF BOTH SHOULDERS: ICD-10-CM

## 2023-12-21 DIAGNOSIS — M25.512 CHRONIC PAIN OF BOTH SHOULDERS: ICD-10-CM

## 2023-12-21 DIAGNOSIS — I10 ESSENTIAL HYPERTENSION: ICD-10-CM

## 2023-12-21 DIAGNOSIS — E87.6 HYPOKALEMIA: ICD-10-CM

## 2023-12-21 DIAGNOSIS — R53.81 PHYSICAL DECONDITIONING: ICD-10-CM

## 2023-12-21 DIAGNOSIS — M62.81 MUSCLE WEAKNESS: ICD-10-CM

## 2023-12-21 NOTE — PROGRESS NOTES
Virgilio Garrett.  1950. APRN Encounter 23. Met with patient during therapy working with PT, OT. Patient reports both ears have improved with Debrox ear drops; no longer needs them for now. Ear drops discontinued. Patient reports she continues to lose weight since rehospitalization. Weight 235# from initial admission weight to [facility] ([facility] of 300.8#. Therapy Update:  ambulating 30 feet x 3 with min assist/CBA and w/c follow. Bilateral compression wraps in place. Kandice Montejo  : 1950 . 68year old  female patient is admitted to [address] [place] [address], medication management, local wound care and lymphedema therapy. [facility] Admission:       23  Discharged to [address] [place] [address] Street:   23    Chief complaint:  LE lymphedema, hypokalemia, HTN    Discharge Diagnoses:  Generalized Edema; d/t combination of lymphedema and protein deficiency and stasis from obesity. Hypokalemia  Hypomagnesemia  HTN  HLD  HX: DVT - off  A/C  Metabolic alkalosis    HPI  Patient admitted to [facility] for generalized edema; Seems to be due to combination of lymphedema and protein deficiency and stasis from obesity. Patient improved with iv diuresis. Oral diuretics were increased on discharge. No signs of active infection.      Past Medical History:   Diagnosis Date    Cataract     right eye completed; future plan for left eye procceure    Disorder of thyroid 2023    uses synthroid    Essential hypertension     High blood pressure     High cholesterol     Hx of pancreatitis     6 times    Hyperlipidemia     Lymphedema of both lower extremities     longstanding, unknown origin    Muscle weakness     Visual impairment      Past Surgical History:   Procedure Laterality Date    CATARACT      CHOLECYSTECTOMY       Family History   Problem Relation Age of Onset    Hypertension Father     Heart Disorder Paternal Grandfather     Psychiatric Sister     Lipids Brother     Obesity Sister      Social History     Socioeconomic History    Marital status: Single   Tobacco Use    Smoking status: Never    Smokeless tobacco: Never   Vaping Use    Vaping Use: Never used   Substance and Sexual Activity    Alcohol use: Not Currently    Drug use: Not Currently     Social Determinants of Health     Food Insecurity: No Food Insecurity (12/4/2023)    Food Insecurity     Food Insecurity: Never true   Transportation Needs: No Transportation Needs (12/4/2023)    Transportation Needs     Lack of Transportation: No   Recent Concern: Transportation Needs - Unmet Transportation Needs (11/9/2023)    Transportation Needs     Lack of Transportation: Yes   Housing Stability: Low Risk  (12/4/2023)    Housing Stability     Housing Instability: No     IMMUNIZATIONS  Immunization History   Administered Date(s) Administered    Covid-19 Vaccine AquaBlok (J&J) 0.5ml 04/06/2021, 04/14/2022    FLU VAC High Dose 65 YRS & Older PRSV Free (21129) 11/17/2023      ALLERGIES:  Allergies   Allergen Reactions    Amoxicillin HIVES     Tolerates cefazolin (8/6/21)    Erythromycin HIVES    Penicillins HIVES     Tolerates rocephin in the past, tolerates cefazolin (8/6/21)      Tetracycline HIVES    Alc-Benzyl Alc-Sulfamethoxazole-Trimethoprim RASH    Sulfa Antibiotics RASH    Tetracyclines & Related RASH    Opioid Analgesics NAUSEA ONLY     Morphine       CODE STATUS:  Full Code    ADVANCED CARE PLANNING TEAM: Will need family care plan updates to arrange safe discharge. PT UPDATE:  gait training with rolling walker ambulating 25 ft x 1 and 15 ft x 2. Unsteady knees; poor balance.     CURRENT MEDICATIONS - reviewed and updated on SNF [facility]  Levothyroxine 100 mcg qam  Torsemide 40 8am and 4 pm   Tramadol 50 q 8 hrs prn  Atorvastatin 10 mg q hs  Carbamide peroxide 6.5% - ear drops discontinued   Clear eyes for dry eyes; 1 gtt both eyes prn daily  Cyanocobalamin 1000 mcg daily  Gabapentin 100 mg daily  Metoprolol 100 qam; 50 mg q hs  Nystatin 100,000 powder to affected areas daily  Risperidone 0.5 mg (1 mg) q hs  Secura skin care topically prn  Sodium chloride 0.65%-1 spray nasally for congestion  Vit D 2000 units daily     SUBJECTIVE:  Denies chest pain, palpitations, SOB, nausea, vomiting, diarrhea; c/o weakness and deconditioning. PHYSICAL EXAM:  126/75,  P 57. RR 18. POx 98%. T 97. 6. wgt  235# from 252.4# , 300.8#  GENERAL HEALTH:well developed, well nourished, in no apparent distress   LINES, TUBES, DRAINS:  none  SKIN: warm, dry R & L hip non-pressure wounds. WOUND: see wound assessment per staff nurses,   EYES: Pupils round and equal; no jaundice. + dry eyes; no drainage from eyes  HENT: no nasal drainage, mucous membranes pink    NECK:supple. FROM  BREAST: deferred exam   RESPIRATORY:lungs clear  CARDIOVASCULAR.  RRR, rate 57   ABDOMEN: BS+, soft, nondistended; no guarding. Lisabeth Livers  bilateral  MUSCULOSKELETAL:  weakness, tires in therapy. EXTREMITIES/VASCULAR:upper and lower extremity edema improving on oral diuretics. NEUROLOGIC:follows commands  PSYCHIATRIC: alert and oriented x 3; affect appropriate     MEDICAL DECISION MAKING: able to make own decisions. Lab Results: 12/19/23  WBC 6.9. HGB 10.7. Plts 172. Na 142. K 3.0. Cl 103. CO2 33. BUN 20. Creat 0.8. Miguel Angel Scott     Assessment / Plan    Bilateral LE Lymphedema  Torsemide 40 mg 2 x day  Lymphedema Therapy  Trend weights - today 235#  PT/OT  Compression wraps    Hypothyroidism  Levothyroxine 100 daily before breakfast    Hypokalemia  K+ 20 meq daily  Trend labs    HL:  atorvastatin 10 mg q hs    R Hip and L Hip nonpressure wounds  Wound MD following  Xeroform gauze 3 x week    GI propohylaxis  Famotidine 20 mg daily    Pain   Gabapentin 100 mg daily  Tylenol 1000mg q 8 hrs prn  Tramadol 50 mg q 8 hrs prn     HTN  Metoprolol tartrate 100 mg qam  Metoprolol tartrate 50 mg q hs    Vitamin Supp  Vit B-12 1000 mcg daily  Vitamin D 2000 UT daily    Excess Cerumen - resolved    Weakness/ decondition  PT/OT  Lymphedema    Dispo:  short term rehab    FOLLOW UP APPOINTMENTS   *Follow-Up with PCP within 1-2 weeks following BRANDYN discharge. *Follow-Up with specialists as recommended. *Greater than 45 minutes spent w/ patient and staff, including but not limited to/ reviewing present status, needs, abilities with disciplines, reviewing medical records, vital signs, labs, completing med rec and entering orders to establish plan of care in [facility]. Note to patient: The [facility] makes medical notes like these available to patients in the interest of transparency. However, this is a medical document intended as peer to peer communication. It is written in medical language and may contain abbreviations or verbiage that are unfamiliar. It may appear blunt or direct. Medical documents are intended to carry relevant information, facts as evident, and the clinical opinion of the practitioner who signs the document.     [facility] MARCELL Ruiz  12/19/23  12 pm  [facility] Post Acute Care Team

## 2023-12-22 VITALS
BODY MASS INDEX: 42 KG/M2 | RESPIRATION RATE: 20 BRPM | DIASTOLIC BLOOD PRESSURE: 86 MMHG | WEIGHT: 235.19 LBS | OXYGEN SATURATION: 96 % | HEART RATE: 66 BPM | SYSTOLIC BLOOD PRESSURE: 135 MMHG | TEMPERATURE: 97 F

## 2023-12-23 NOTE — PROGRESS NOTES
Samreen .  . APRN 23 11am    Met with patient during therapy session. She reports feeling stronger and able to ambulate better since significant weight loss from the hospital.  Now able to wear bilateral compression hose and bilateral compression wraps over. THERAPY UPDATE: ambulating 40 ft x 1, 30 ft x 2 with CGA and w/c follow w/verbal cues for safety; needs rest periods during ambulation and sitting exercises. [place]  : 1950 . 68year old  female patient is admitted to [address] [place] [address], medication management, local wound care and lymphedema therapy. [facility] Admission:       23  Discharged to [facility]:   23    Chief complaint:  LE edema; bilateral lymphedema, hypokalemia, HTN    Discharge Diagnoses:  Generalized Edema; d/t combination of lymphedema and protein deficiency and stasis from obesity. Hypokalemia  Hypomagnesemia  HTN  HLD  HX: DVT - off  A/C  Metabolic alkalosis    HPI  Patient admitted to [facility] for generalized edema; Seems to be due to combination of lymphedema and protein deficiency and stasis from obesity. Patient improved with iv diuresis. Oral diuretics were increased on discharge. No signs of active infection.      Past Medical History:   Diagnosis Date    Cataract     right eye completed; future plan for left eye procceure    Disorder of thyroid 2023    uses synthroid    Essential hypertension     High blood pressure     High cholesterol     Hx of pancreatitis     6 times    Hyperlipidemia     Lymphedema of both lower extremities     longstanding, unknown origin    Muscle weakness     Visual impairment      Past Surgical History:   Procedure Laterality Date    CATARACT      CHOLECYSTECTOMY       Family History   Problem Relation Age of Onset    Hypertension Father     Heart Disorder Paternal Grandfather     Psychiatric Sister     Lipids Brother     Obesity Sister      Social History Socioeconomic History    Marital status: Single   Tobacco Use    Smoking status: Never    Smokeless tobacco: Never   Vaping Use    Vaping Use: Never used   Substance and Sexual Activity    Alcohol use: Not Currently    Drug use: Not Currently     Social Determinants of Health     Food Insecurity: No Food Insecurity (12/4/2023)    Food Insecurity     Food Insecurity: Never true   Transportation Needs: No Transportation Needs (12/4/2023)    Transportation Needs     Lack of Transportation: No   Recent Concern: Transportation Needs - Unmet Transportation Needs (11/9/2023)    Transportation Needs     Lack of Transportation: Yes   Housing Stability: Low Risk  (12/4/2023)    Housing Stability     Housing Instability: No     IMMUNIZATIONS  Immunization History   Administered Date(s) Administered    Covid-19 Vaccine EvaluAgent (J&J) 0.5ml 04/06/2021, 04/14/2022    FLU VAC High Dose 65 YRS & Older PRSV Free (90031) 11/17/2023      ALLERGIES:  Allergies   Allergen Reactions    Amoxicillin HIVES     Tolerates cefazolin (8/6/21)    Erythromycin HIVES    Penicillins HIVES     Tolerates rocephin in the past, tolerates cefazolin (8/6/21)      Tetracycline HIVES    Alc-Benzyl Alc-Sulfamethoxazole-Trimethoprim RASH    Sulfa Antibiotics RASH    Tetracyclines & Related RASH    Opioid Analgesics NAUSEA ONLY     Morphine       CODE STATUS:  Full Code    ADVANCED CARE PLANNING TEAM: Will need family care plan updates to arrange safe discharge.     CURRENT MEDICATIONS - reviewed and updated on SNF EM[facility]  Levothyroxine 100 mcg qam  Torsemide 40 q 8am and 4 pm   Tramadol 50 q 8 hrs prn  Atorvastatin 10 mg q hs  Carbamide peroxide 6.5% - ear drops discontinued   Clear eyes for dry eyes; 1 gtt both eyes prn daily  Cyanocobalamin 1000 mcg daily  Gabapentin 100 mg daily  Metoprolol 100 qam; 50 mg q hs - hold for  or less  Nystatin 100,000 powder to affected areas daily  Risperidone 0.5 mg (1 mg) q hs  Secura skin care topically prn  Sodium chloride 0.65%-1 spray nasally for congestion  Vit D 2000 units daily     SUBJECTIVE:  Denies chest pain, palpitations, SOB, nausea, vomiting, diarrhea; c/o left sided neck pain, weakness and deconditioning. Ox 96%. T 97. 4. wgt     PHYSICAL EXAM:  135/86. P 66. RR 20. POx 96%. T 97. 4. wgt  235.2# from 252.4# , 300.8#  GENERAL HEALTH:well developed, well nourished, in no apparent distress    LINES, TUBES, DRAINS:  none  SKIN: warm, dry R & L hip non-pressure wounds resolved. WOUND: see wound assessment per staff nurse  EYES: Pupils round and equal; no jaundice. + dry eyes; no drainage from eyes  HENT: no nasal drainage, mucous membranes moist.    NECK:supple. FROM. Left sided Neck pain. BREAST: deferred exam   RESPIRATORY:lungs clear  CARDIOVASCULAR.  RRR, rate 66. ABDOMEN: BS+, soft, nondistended; no guarding. :Deferred  LYMPHATIC:lymphedema  bilateral. Compression wraps on. MUSCULOSKELETAL:  weakness, tires in therapy. EXTREMITIES/VASCULAR:upper and lower extremity edema improving;  on oral diuretics. NEUROLOGIC:follows commands  PSYCHIATRIC: alert and oriented x 3; affect appropriate     MEDICAL DECISION MAKING: able to make own decisions. Lab Results: 12/19/23  WBC 6.9. HGB 10.7. Plts 172. Na 142. K 3.0. Cl 103. CO2 33. BUN 20. Creat 0.8. David Anis     Assessment / Plan    Bilateral LE Lymphedema  Torsemide 40 mg 2 x day  Lymphedema Therapy  Trend daily weights - today 235.2#  PT/OT  Compression wraps    Hypothyroidism  Levothyroxine 100 daily before breakfast    Hypokalemia  (3.0)  K+ 20 meq daily  Trend labs    HL:  atorvastatin 10 mg q hs    R Hip and L Hip nonpressure wounds - resolved    GI propohylaxis  Famotidine 20 mg daily    Pain   Gabapentin 100 mg daily  Tylenol 1000mg q 8 hrs prn  Tramadol 50 mg q 8 hrs prn     HTN  Metoprolol tartrate 100 mg qam - hold for  or below  Metoprolol tartrate 50 mg q hs w/ hold parameters    Vitamin Supp  Vit B-12 1000 mcg daily  Vitamin D 2000 UT daily    Excess Cerumen - resolved    Weakness/ decondition  PT/OT  Lymphedema therapy    Dispo:  short term rehab    FOLLOW UP APPOINTMENTS   *Follow-Up with PCP within 1-2 weeks following [facility] discharge. *Follow-Up with specialists as recommended. *Greater than 35 minutes spent w/ patient and staff, including but not limited to/ reviewing present status, needs, abilities with disciplines, reviewing medical records, vital signs, labs, completing med rec and entering orders to establish plan of care in [facility]. Note to patient: The [facility] makes medical notes like these available to patients in the interest of transparency. However, this is a medical document intended as peer to peer communication. It is written in medical language and may contain abbreviations or verbiage that are unfamiliar. It may appear blunt or direct. Medical documents are intended to carry relevant information, facts as evident, and the clinical opinion of the practitioner who signs the document.     Gay Pascual, MARCELL  12/21/23  11am   [facility] Post Acute Care Team

## 2023-12-28 ENCOUNTER — SNF VISIT (OUTPATIENT)
Dept: INTERNAL MEDICINE CLINIC | Age: 73
End: 2023-12-28

## 2023-12-28 DIAGNOSIS — G89.29 CHRONIC PAIN OF BOTH SHOULDERS: ICD-10-CM

## 2023-12-28 DIAGNOSIS — E87.6 HYPOKALEMIA: ICD-10-CM

## 2023-12-28 DIAGNOSIS — R60.0 BILATERAL LEG EDEMA: ICD-10-CM

## 2023-12-28 DIAGNOSIS — I89.0 LYMPHEDEMA OF BOTH LOWER EXTREMITIES: Primary | ICD-10-CM

## 2023-12-28 DIAGNOSIS — R26.9 GAIT ABNORMALITY: ICD-10-CM

## 2023-12-28 DIAGNOSIS — R53.81 PHYSICAL DECONDITIONING: ICD-10-CM

## 2023-12-28 DIAGNOSIS — R53.83 LETHARGY: ICD-10-CM

## 2023-12-28 DIAGNOSIS — M25.511 CHRONIC PAIN OF BOTH SHOULDERS: ICD-10-CM

## 2023-12-28 DIAGNOSIS — B37.0 ORAL THRUSH: ICD-10-CM

## 2023-12-28 DIAGNOSIS — M25.512 CHRONIC PAIN OF BOTH SHOULDERS: ICD-10-CM

## 2023-12-28 PROCEDURE — 99309 SBSQ NF CARE MODERATE MDM 30: CPT | Performed by: NURSE PRACTITIONER

## 2023-12-29 ENCOUNTER — APPOINTMENT (OUTPATIENT)
Dept: GENERAL RADIOLOGY | Facility: HOSPITAL | Age: 73
End: 2023-12-29
Attending: EMERGENCY MEDICINE
Payer: MEDICARE

## 2023-12-29 ENCOUNTER — APPOINTMENT (OUTPATIENT)
Dept: MRI IMAGING | Facility: HOSPITAL | Age: 73
End: 2023-12-29
Attending: EMERGENCY MEDICINE
Payer: MEDICARE

## 2023-12-29 ENCOUNTER — HOSPITAL ENCOUNTER (INPATIENT)
Facility: HOSPITAL | Age: 73
LOS: 2 days | Discharge: NURSING, REHAB OR LONG-TERM CARE | End: 2023-12-31
Attending: EMERGENCY MEDICINE | Admitting: HOSPITALIST
Payer: MEDICARE

## 2023-12-29 VITALS
RESPIRATION RATE: 19 BRPM | BODY MASS INDEX: 43 KG/M2 | DIASTOLIC BLOOD PRESSURE: 83 MMHG | SYSTOLIC BLOOD PRESSURE: 148 MMHG | WEIGHT: 242.63 LBS | OXYGEN SATURATION: 97 % | HEART RATE: 69 BPM | TEMPERATURE: 98 F

## 2023-12-29 DIAGNOSIS — I63.9 ACUTE CVA (CEREBROVASCULAR ACCIDENT) (HCC): Primary | ICD-10-CM

## 2023-12-29 LAB
ALBUMIN SERPL-MCNC: 2.7 G/DL (ref 3.4–5)
ALBUMIN/GLOB SERPL: 0.6 {RATIO} (ref 1–2)
ALP LIVER SERPL-CCNC: 90 U/L
ALT SERPL-CCNC: 14 U/L
ANION GAP SERPL CALC-SCNC: 6 MMOL/L (ref 0–18)
APTT PPP: 28.8 SECONDS (ref 23.3–35.6)
AST SERPL-CCNC: 21 U/L (ref 15–37)
ATRIAL RATE: 58 BPM
BASOPHILS # BLD AUTO: 0.02 X10(3) UL (ref 0–0.2)
BASOPHILS NFR BLD AUTO: 0.2 %
BILIRUB SERPL-MCNC: 0.6 MG/DL (ref 0.1–2)
BILIRUB UR QL STRIP.AUTO: NEGATIVE
BUN BLD-MCNC: 32 MG/DL (ref 9–23)
CALCIUM BLD-MCNC: 8.6 MG/DL (ref 8.5–10.1)
CHLORIDE SERPL-SCNC: 99 MMOL/L (ref 98–112)
CLARITY UR REFRACT.AUTO: CLEAR
CO2 SERPL-SCNC: 32 MMOL/L (ref 21–32)
COLOR UR AUTO: COLORLESS
CREAT BLD-MCNC: 1.23 MG/DL
EGFRCR SERPLBLD CKD-EPI 2021: 46 ML/MIN/1.73M2 (ref 60–?)
EOSINOPHIL # BLD AUTO: 0.33 X10(3) UL (ref 0–0.7)
EOSINOPHIL NFR BLD AUTO: 3.3 %
ERYTHROCYTE [DISTWIDTH] IN BLOOD BY AUTOMATED COUNT: 12.2 %
EST. AVERAGE GLUCOSE BLD GHB EST-MCNC: 108 MG/DL (ref 68–126)
GLOBULIN PLAS-MCNC: 4.3 G/DL (ref 2.8–4.4)
GLUCOSE BLD-MCNC: 143 MG/DL (ref 70–99)
GLUCOSE UR STRIP.AUTO-MCNC: NORMAL MG/DL
HBA1C MFR BLD: 5.4 % (ref ?–5.7)
HCT VFR BLD AUTO: 36 %
HGB BLD-MCNC: 12.3 G/DL
IMM GRANULOCYTES # BLD AUTO: 0.04 X10(3) UL (ref 0–1)
IMM GRANULOCYTES NFR BLD: 0.4 %
INR BLD: 1.24 (ref 0.8–1.2)
KETONES UR STRIP.AUTO-MCNC: NEGATIVE MG/DL
LEUKOCYTE ESTERASE UR QL STRIP.AUTO: NEGATIVE
LYMPHOCYTES # BLD AUTO: 0.92 X10(3) UL (ref 1–4)
LYMPHOCYTES NFR BLD AUTO: 9.1 %
MCH RBC QN AUTO: 33.1 PG (ref 26–34)
MCHC RBC AUTO-ENTMCNC: 34.2 G/DL (ref 31–37)
MCV RBC AUTO: 96.8 FL
MONOCYTES # BLD AUTO: 1.13 X10(3) UL (ref 0.1–1)
MONOCYTES NFR BLD AUTO: 11.1 %
NEUTROPHILS # BLD AUTO: 7.7 X10 (3) UL (ref 1.5–7.7)
NEUTROPHILS # BLD AUTO: 7.7 X10(3) UL (ref 1.5–7.7)
NEUTROPHILS NFR BLD AUTO: 75.9 %
NITRITE UR QL STRIP.AUTO: NEGATIVE
OSMOLALITY SERPL CALC.SUM OF ELEC: 293 MOSM/KG (ref 275–295)
P AXIS: 30 DEGREES
P-R INTERVAL: 212 MS
PH UR STRIP.AUTO: 6.5 [PH] (ref 5–8)
PLATELET # BLD AUTO: 178 10(3)UL (ref 150–450)
POTASSIUM SERPL-SCNC: 3.7 MMOL/L (ref 3.5–5.1)
PROCALCITONIN SERPL-MCNC: <0.05 NG/ML (ref ?–0.16)
PROT SERPL-MCNC: 7 G/DL (ref 6.4–8.2)
PROT UR STRIP.AUTO-MCNC: NEGATIVE MG/DL
PROTHROMBIN TIME: 15.6 SECONDS (ref 11.6–14.8)
Q-T INTERVAL: 446 MS
QRS DURATION: 94 MS
QTC CALCULATION (BEZET): 437 MS
R AXIS: -18 DEGREES
RBC # BLD AUTO: 3.72 X10(6)UL
RBC UR QL AUTO: NEGATIVE
SARS-COV-2 RNA RESP QL NAA+PROBE: NOT DETECTED
SODIUM SERPL-SCNC: 137 MMOL/L (ref 136–145)
SP GR UR STRIP.AUTO: 1.01 (ref 1–1.03)
T AXIS: 2 DEGREES
TROPONIN I SERPL HS-MCNC: 10 NG/L
UROBILINOGEN UR STRIP.AUTO-MCNC: NORMAL MG/DL
VENTRICULAR RATE: 58 BPM
WBC # BLD AUTO: 10.1 X10(3) UL (ref 4–11)

## 2023-12-29 PROCEDURE — 71045 X-RAY EXAM CHEST 1 VIEW: CPT | Performed by: EMERGENCY MEDICINE

## 2023-12-29 PROCEDURE — 99223 1ST HOSP IP/OBS HIGH 75: CPT | Performed by: STUDENT IN AN ORGANIZED HEALTH CARE EDUCATION/TRAINING PROGRAM

## 2023-12-29 PROCEDURE — 70551 MRI BRAIN STEM W/O DYE: CPT | Performed by: EMERGENCY MEDICINE

## 2023-12-29 PROCEDURE — 70544 MR ANGIOGRAPHY HEAD W/O DYE: CPT | Performed by: EMERGENCY MEDICINE

## 2023-12-29 RX ORDER — POLYETHYLENE GLYCOL 3350 17 G/17G
17 POWDER, FOR SOLUTION ORAL DAILY PRN
Status: DISCONTINUED | OUTPATIENT
Start: 2023-12-29 | End: 2023-12-31

## 2023-12-29 RX ORDER — ASPIRIN 300 MG/1
300 SUPPOSITORY RECTAL DAILY
Status: DISCONTINUED | OUTPATIENT
Start: 2023-12-30 | End: 2023-12-31

## 2023-12-29 RX ORDER — SODIUM CHLORIDE 9 MG/ML
INJECTION, SOLUTION INTRAVENOUS CONTINUOUS
Status: ACTIVE | OUTPATIENT
Start: 2023-12-29 | End: 2023-12-29

## 2023-12-29 RX ORDER — LEVOTHYROXINE SODIUM 0.1 MG/1
100 TABLET ORAL
Status: DISCONTINUED | OUTPATIENT
Start: 2023-12-30 | End: 2023-12-31

## 2023-12-29 RX ORDER — ONDANSETRON 2 MG/ML
4 INJECTION INTRAMUSCULAR; INTRAVENOUS EVERY 4 HOURS PRN
Status: DISCONTINUED | OUTPATIENT
Start: 2023-12-29 | End: 2023-12-29

## 2023-12-29 RX ORDER — METOCLOPRAMIDE HYDROCHLORIDE 5 MG/ML
5 INJECTION INTRAMUSCULAR; INTRAVENOUS EVERY 8 HOURS PRN
Status: DISCONTINUED | OUTPATIENT
Start: 2023-12-29 | End: 2023-12-31

## 2023-12-29 RX ORDER — GABAPENTIN 100 MG/1
100 CAPSULE ORAL NIGHTLY
Status: DISCONTINUED | OUTPATIENT
Start: 2023-12-29 | End: 2023-12-31

## 2023-12-29 RX ORDER — ENEMA 19; 7 G/133ML; G/133ML
1 ENEMA RECTAL ONCE AS NEEDED
Status: DISCONTINUED | OUTPATIENT
Start: 2023-12-29 | End: 2023-12-31

## 2023-12-29 RX ORDER — DIAZEPAM 5 MG/ML
2.5 INJECTION, SOLUTION INTRAMUSCULAR; INTRAVENOUS ONCE
Status: COMPLETED | OUTPATIENT
Start: 2023-12-29 | End: 2023-12-29

## 2023-12-29 RX ORDER — ACETAMINOPHEN 650 MG/1
650 SUPPOSITORY RECTAL EVERY 4 HOURS PRN
Status: DISCONTINUED | OUTPATIENT
Start: 2023-12-29 | End: 2023-12-31

## 2023-12-29 RX ORDER — HYDRALAZINE HYDROCHLORIDE 20 MG/ML
10 INJECTION INTRAMUSCULAR; INTRAVENOUS EVERY 2 HOUR PRN
Status: DISCONTINUED | OUTPATIENT
Start: 2023-12-29 | End: 2023-12-31

## 2023-12-29 RX ORDER — ASPIRIN 300 MG/1
300 SUPPOSITORY RECTAL ONCE
Status: COMPLETED | OUTPATIENT
Start: 2023-12-29 | End: 2023-12-29

## 2023-12-29 RX ORDER — TORSEMIDE 20 MG/1
40 TABLET ORAL 2 TIMES DAILY
Status: DISCONTINUED | OUTPATIENT
Start: 2023-12-29 | End: 2023-12-31

## 2023-12-29 RX ORDER — ONDANSETRON 2 MG/ML
4 INJECTION INTRAMUSCULAR; INTRAVENOUS EVERY 6 HOURS PRN
Status: DISCONTINUED | OUTPATIENT
Start: 2023-12-29 | End: 2023-12-31

## 2023-12-29 RX ORDER — ASPIRIN 325 MG
325 TABLET ORAL DAILY
Status: DISCONTINUED | OUTPATIENT
Start: 2023-12-30 | End: 2023-12-31

## 2023-12-29 RX ORDER — ATORVASTATIN CALCIUM 10 MG/1
10 TABLET, FILM COATED ORAL NIGHTLY
Status: DISCONTINUED | OUTPATIENT
Start: 2023-12-29 | End: 2023-12-30

## 2023-12-29 RX ORDER — SODIUM CHLORIDE 9 MG/ML
125 INJECTION, SOLUTION INTRAVENOUS CONTINUOUS
Status: DISCONTINUED | OUTPATIENT
Start: 2023-12-29 | End: 2023-12-30

## 2023-12-29 RX ORDER — BISACODYL 10 MG
10 SUPPOSITORY, RECTAL RECTAL
Status: DISCONTINUED | OUTPATIENT
Start: 2023-12-29 | End: 2023-12-31

## 2023-12-29 RX ORDER — ACETAMINOPHEN 325 MG/1
650 TABLET ORAL EVERY 4 HOURS PRN
Status: DISCONTINUED | OUTPATIENT
Start: 2023-12-29 | End: 2023-12-31

## 2023-12-29 RX ORDER — METOPROLOL TARTRATE 100 MG/1
100 TABLET ORAL EVERY MORNING
Status: DISCONTINUED | OUTPATIENT
Start: 2023-12-30 | End: 2023-12-31

## 2023-12-29 RX ORDER — RISPERIDONE 0.25 MG/1
0.5 TABLET ORAL NIGHTLY PRN
Status: DISCONTINUED | OUTPATIENT
Start: 2023-12-29 | End: 2023-12-31

## 2023-12-29 RX ORDER — LIDOCAINE 4 G/G
1 PATCH TOPICAL 2 TIMES DAILY PRN
COMMUNITY

## 2023-12-29 RX ORDER — SENNOSIDES 8.6 MG
17.2 TABLET ORAL NIGHTLY PRN
Status: DISCONTINUED | OUTPATIENT
Start: 2023-12-29 | End: 2023-12-31

## 2023-12-29 RX ORDER — ECHINACEA PURPUREA EXTRACT 125 MG
1 TABLET ORAL
Status: DISCONTINUED | OUTPATIENT
Start: 2023-12-29 | End: 2023-12-31

## 2023-12-29 RX ORDER — RISPERIDONE 0.25 MG/1
1 TABLET ORAL NIGHTLY
Status: DISCONTINUED | OUTPATIENT
Start: 2023-12-29 | End: 2023-12-31

## 2023-12-29 RX ORDER — LABETALOL HYDROCHLORIDE 5 MG/ML
10 INJECTION, SOLUTION INTRAVENOUS EVERY 10 MIN PRN
Status: DISCONTINUED | OUTPATIENT
Start: 2023-12-29 | End: 2023-12-31

## 2023-12-29 RX ORDER — MELATONIN
3 NIGHTLY PRN
Status: DISCONTINUED | OUTPATIENT
Start: 2023-12-29 | End: 2023-12-31

## 2023-12-29 RX ORDER — GUAIFENESIN 600 MG/1
600 TABLET, EXTENDED RELEASE ORAL 2 TIMES DAILY PRN
Status: DISCONTINUED | OUTPATIENT
Start: 2023-12-29 | End: 2023-12-31

## 2023-12-29 RX ORDER — BENZONATATE 100 MG/1
200 CAPSULE ORAL 3 TIMES DAILY PRN
Status: DISCONTINUED | OUTPATIENT
Start: 2023-12-29 | End: 2023-12-31

## 2023-12-29 RX ORDER — METOPROLOL TARTRATE 50 MG/1
50 TABLET, FILM COATED ORAL NIGHTLY
Status: DISCONTINUED | OUTPATIENT
Start: 2023-12-29 | End: 2023-12-31

## 2023-12-29 RX ORDER — ACETAMINOPHEN 500 MG
500 TABLET ORAL EVERY 4 HOURS PRN
Status: DISCONTINUED | OUTPATIENT
Start: 2023-12-29 | End: 2023-12-31

## 2023-12-29 NOTE — ED QUICK NOTES
Orders for admission, patient is aware of plan and ready to go upstairs. Any questions, please call ED RN Jane Saunders at extension [phone].      Patient Covid vaccination status: Fully vaccinated     COVID Test Ordered in ED: Rapid SARS-CoV-2 by PCR    COVID Suspicion at Admission: N/A    Running Infusions:    sodium chloride 125 mL/hr (12/29/23 1422)        Mental Status/LOC at time of transport: aox3     Other pertinent information:   CIWA score: N/A   NIH score:  3

## 2023-12-29 NOTE — PROGRESS NOTES
Susie Mccoy.  1950. APRN Encounter 23 9 pm. Late entry. Met with patient approximately 6 pm and 9pm.  RN evaluated pt during her shift. Able to take oral medications. OR x 4. Patient c/o weakness and fatigue with tongue feeling thick. Tongue examined with minimal oral thrush lateral sides of tongue. Patient tearful and anxious throughout encounter reporting she was informed today she may need to go to Assisted Living and unable to return to her condo. Neuro check normal.  Pupils round and eqqual;   EOMI; no facial droop. Tongue in the midline. Able to move upper and lower extremities - equal in strength  per her ability; no changes noted or reported. Agreed to start nystatin swish and swallow. Discussed sending to ER for evaluation. Patient prefers not going to the ER; requested to monitor throughout the night and day and wait for PCP to evaluate. PCP notified. Informed patient if any symptoms change or worsen, she will have to be evaluated in the ER. Patient agreed with plan of care. Staff notified of plan of care and will monitor patient's VS and neuro checks. THERAPY UPDATE TODAY . Ambulated 40 ft x 1, 35 ft x 1, 25 ft x 1 with CGA and w/c follow w/verbal cues for safety. Bed mobility max assist, transfers w/ CGA. needs rest periods during ambulation and sitting exercises. Edyta Mason  : 1950 . 68year old  female patient is admitted to [address] [place] [address], medication management, local wound care and lymphedema therapy. [facility] Admission:       23  Discharged to [place] [facility]:   23    Chief complaint last hospital stay: LE edema; bilat lymphedema, hypokalemia, HTN    Discharge Diagnoses:  Generalized Edema; d/t combination of lymphedema and protein deficiency and stasis from obesity.   Hypokalemia  Hypomagnesemia  HTN  HLD  HX: DVT - off  A/C  Metabolic alkalosis    HPI  Patient admitted to [facility] for generalized edema; Seems to be due to combination of lymphedema and protein deficiency and stasis from obesity. Patient improved with iv diuresis. Oral diuretics were increased on discharge. No signs of active infection.      Past Medical History:   Diagnosis Date    Cataract     right eye completed; future plan for left eye procceure    Disorder of thyroid 11/9/2023    uses synthroid    Essential hypertension     High blood pressure     High cholesterol     Hx of pancreatitis     6 times    Hyperlipidemia     Lymphedema of both lower extremities     longstanding, unknown origin    Muscle weakness     Visual impairment      Past Surgical History:   Procedure Laterality Date    CATARACT      CHOLECYSTECTOMY       Family History   Problem Relation Age of Onset    Hypertension Father     Heart Disorder Paternal Grandfather     Psychiatric Sister     Lipids Brother     Obesity Sister      Social History     Socioeconomic History    Marital status: Single   Tobacco Use    Smoking status: Never    Smokeless tobacco: Never   Vaping Use    Vaping Use: Never used   Substance and Sexual Activity    Alcohol use: Not Currently    Drug use: Not Currently     Social Determinants of Health     Food Insecurity: No Food Insecurity (12/4/2023)    Food Insecurity     Food Insecurity: Never true   Transportation Needs: No Transportation Needs (12/4/2023)    Transportation Needs     Lack of Transportation: No   Recent Concern: Transportation Needs - Unmet Transportation Needs (11/9/2023)    Transportation Needs     Lack of Transportation: Yes   Housing Stability: Low Risk  (12/4/2023)    Housing Stability     Housing Instability: No     IMMUNIZATIONS  Immunization History   Administered Date(s) Administered    Covid-19 Vaccine NetConstat (J&J) 0.5ml 04/06/2021, 04/14/2022    FLU VAC High Dose 65 YRS & Older PRSV Free (69788) 11/17/2023      ALLERGIES:  Allergies   Allergen Reactions    Amoxicillin HIVES     Tolerates cefazolin (8/6/21) Erythromycin HIVES    Penicillins HIVES     Tolerates rocephin in the past, tolerates cefazolin (8/6/21)      Tetracycline HIVES    Alc-Benzyl Alc-Sulfamethoxazole-Trimethoprim RASH    Sulfa Antibiotics RASH    Tetracyclines & Related RASH    Opioid Analgesics NAUSEA ONLY     Morphine       CODE STATUS:  Full Code    ADVANCED CARE PLANNING TEAM: Will need family care plan updates to arrange safe discharge. CURRENT MEDICATIONS - reviewed and updated on SNF EMAR  Levothyroxine 100 mcg qam  Torsemide 40 q 8am and 4 pm   Tramadol 50 q 8 hrs prn  Atorvastatin 10 mg q hs  Carbamide peroxide 6.5% - ear drops discontinued   Clear eyes for dry eyes; 1 gtt both eyes prn daily  Cyanocobalamin 1000 mcg daily  Gabapentin 100 mg daily  Metoprolol 100 qam; 50 mg q hs - hold for  or less  Nystatin 100,000 powder to affected areas daily  Risperidone 0.5 mg (1 mg) q hs  Secura skin care topically prn  Sodium chloride 0.65%-1 spray nasally for congestion  Vit D 2000 units daily  Nystatin swish and swallow    SUBJECTIVE:  Denies chest pain, palpitations, SOB, nausea, vomiting, diarrhea;  C/O increased weakness and lethargy. C/O thick tongue. PHYSICAL EXAM:  148/83. P69. RR 19. T 97. 6. wgt 242.6#. GENERAL HEALTH:well developed, well nourished, in no apparent distress  Neuro checks within normal limits. OR x 3; able to follow commands and have a meaningful conversation. LINES, TUBES, DRAINS:  none  SKIN: warm, dry R & L hip non-pressure wounds resolved. WOUND: see skin assessment per staff nurse  EYES: Pupils round and equal; EOM intact. no jaundice. + dry eyes; no drainage from eyes  HENT: no nasal drainage, mucous membranes moist.  Mild thrush lateral sides of tongue; tongue in the midline. No facial droop. NECK:supple. FROM. Left sided Neck pain. BREAST: deferred exam   RESPIRATORY:lungs clear  CARDIOVASCULAR.  RRR, rate 69. ABDOMEN: BS+, soft, nondistended; no guarding.   [place] bilateral. Compression wraps. .  MUSCULOSKELETAL:  weakness, fatigue. EXTREMITIES/VASCULAR:upper and lower extremity edema improving;  on oral diuretics. NEUROLOGIC:follows commands neuro check within normal limits. PSYCHIATRIC: alert and oriented x 3; affect appropriate  anxious and tearful regarding possible discharge to assisted living. MEDICAL DECISION MAKING: able to make own decisions. Lab Results: 12/26/12  WBC 8.2. HGB 11. Plts 138. Na 139. K 3.6. Cl 100. CO2 329  BUN 26. Creat 1.0    Assessment / Plan    Oral thrush  Nystatin swish and swallow x 7 days  Check Vital signs, tongue and speech q shift  Send to ED if speech symptoms worsen    Bilateral LE Lymphedema  Torsemide 40 mg 2 x day  Lymphedema Therapy  Trend daily weights - today 235.2#  PT/OT  Compression wraps    Hypothyroidism  Levothyroxine 100 daily before breakfast    Hypokalemia    K+ 20 meq daily  Trend labs    HL:  atorvastatin 10 mg q hs    R Hip and L Hip nonpressure wounds - resolved    GI propohylaxis  Famotidine 20 mg daily    Pain   Gabapentin 100 mg daily  Tylenol 1000mg q 8 hrs prn  Tramadol 50 mg q 8 hrs prn     HTN  Metoprolol tartrate 100 mg qam - hold for  or below  Metoprolol tartrate 50 mg q hs w/ hold parameters    Vitamin Supp  Vit B-12 1000 mcg daily  Vitamin D 2000 UT daily    Excess Cerumen - resolved    Weakness/ deconditioning  PT/OT  Lymphedema therapy    Dispo:  short term rehab    FOLLOW UP APPOINTMENTS   *Follow-Up with PCP within 1-2 weeks following [facility] discharge. *Follow-Up with specialists as recommended. *Greater than 35 minutes spent w/ patient and staff, including but not limited to/ reviewing present status, needs, abilities with disciplines, reviewing medical records, vital signs, labs, completing med rec and entering orders to establish plan of care in [facility].        Note to patient: The [facility] makes medical notes like these available to patients in the interest of transparency. However, this is a medical document intended as peer to peer communication. It is written in medical language and may contain abbreviations or verbiage that are unfamiliar. It may appear blunt or direct. Medical documents are intended to carry relevant information, facts as evident, and the clinical opinion of the practitioner who signs the document.     MARCELL Spencer  12/28/23  9pm    [facility] Post Acute Care Team

## 2023-12-29 NOTE — ED INITIAL ASSESSMENT (HPI)
facial dooping, slurred speech that started 1800 yesterday, started feeling weak yesterday during therapy around 1100, no blood thinners   From NH, aox4

## 2023-12-30 ENCOUNTER — APPOINTMENT (OUTPATIENT)
Dept: CV DIAGNOSTICS | Facility: HOSPITAL | Age: 73
End: 2023-12-30
Attending: HOSPITALIST
Payer: MEDICARE

## 2023-12-30 LAB
ALBUMIN SERPL-MCNC: 2.2 G/DL (ref 3.4–5)
ALBUMIN/GLOB SERPL: 0.7 {RATIO} (ref 1–2)
ALP LIVER SERPL-CCNC: 70 U/L
ALT SERPL-CCNC: 12 U/L
ANION GAP SERPL CALC-SCNC: 5 MMOL/L (ref 0–18)
AST SERPL-CCNC: 8 U/L (ref 15–37)
BASOPHILS # BLD AUTO: 0.02 X10(3) UL (ref 0–0.2)
BASOPHILS NFR BLD AUTO: 0.3 %
BILIRUB SERPL-MCNC: 0.9 MG/DL (ref 0.1–2)
BUN BLD-MCNC: 29 MG/DL (ref 9–23)
CALCIUM BLD-MCNC: 8.8 MG/DL (ref 8.5–10.1)
CHLORIDE SERPL-SCNC: 106 MMOL/L (ref 98–112)
CHOLEST SERPL-MCNC: 88 MG/DL (ref ?–200)
CO2 SERPL-SCNC: 29 MMOL/L (ref 21–32)
CREAT BLD-MCNC: 0.87 MG/DL
EGFRCR SERPLBLD CKD-EPI 2021: 70 ML/MIN/1.73M2 (ref 60–?)
EOSINOPHIL # BLD AUTO: 0.36 X10(3) UL (ref 0–0.7)
EOSINOPHIL NFR BLD AUTO: 5.2 %
ERYTHROCYTE [DISTWIDTH] IN BLOOD BY AUTOMATED COUNT: 12.1 %
GLOBULIN PLAS-MCNC: 3.1 G/DL (ref 2.8–4.4)
GLUCOSE BLD-MCNC: 105 MG/DL (ref 70–99)
GLUCOSE BLD-MCNC: 121 MG/DL (ref 70–99)
GLUCOSE BLD-MCNC: 88 MG/DL (ref 70–99)
GLUCOSE BLD-MCNC: 89 MG/DL (ref 70–99)
GLUCOSE BLD-MCNC: 94 MG/DL (ref 70–99)
GLUCOSE BLD-MCNC: 96 MG/DL (ref 70–99)
HCT VFR BLD AUTO: 30.8 %
HDLC SERPL-MCNC: 57 MG/DL (ref 40–59)
HGB BLD-MCNC: 10.4 G/DL
IMM GRANULOCYTES # BLD AUTO: 0.02 X10(3) UL (ref 0–1)
IMM GRANULOCYTES NFR BLD: 0.3 %
LDLC SERPL CALC-MCNC: 21 MG/DL (ref ?–100)
LYMPHOCYTES # BLD AUTO: 0.95 X10(3) UL (ref 1–4)
LYMPHOCYTES NFR BLD AUTO: 13.8 %
MCH RBC QN AUTO: 32.8 PG (ref 26–34)
MCHC RBC AUTO-ENTMCNC: 33.8 G/DL (ref 31–37)
MCV RBC AUTO: 97.2 FL
MONOCYTES # BLD AUTO: 0.83 X10(3) UL (ref 0.1–1)
MONOCYTES NFR BLD AUTO: 12 %
NEUTROPHILS # BLD AUTO: 4.71 X10 (3) UL (ref 1.5–7.7)
NEUTROPHILS # BLD AUTO: 4.71 X10(3) UL (ref 1.5–7.7)
NEUTROPHILS NFR BLD AUTO: 68.4 %
NONHDLC SERPL-MCNC: 31 MG/DL (ref ?–130)
OSMOLALITY SERPL CALC.SUM OF ELEC: 295 MOSM/KG (ref 275–295)
PLATELET # BLD AUTO: 145 10(3)UL (ref 150–450)
POTASSIUM SERPL-SCNC: 3.2 MMOL/L (ref 3.5–5.1)
PROT SERPL-MCNC: 5.3 G/DL (ref 6.4–8.2)
RBC # BLD AUTO: 3.17 X10(6)UL
SODIUM SERPL-SCNC: 140 MMOL/L (ref 136–145)
TRIGL SERPL-MCNC: 32 MG/DL (ref 30–149)
VLDLC SERPL CALC-MCNC: 4 MG/DL (ref 0–30)
WBC # BLD AUTO: 6.9 X10(3) UL (ref 4–11)

## 2023-12-30 PROCEDURE — 99233 SBSQ HOSP IP/OBS HIGH 50: CPT | Performed by: HOSPITALIST

## 2023-12-30 PROCEDURE — 93308 TTE F-UP OR LMTD: CPT | Performed by: HOSPITALIST

## 2023-12-30 PROCEDURE — 99223 1ST HOSP IP/OBS HIGH 75: CPT | Performed by: OTHER

## 2023-12-30 RX ORDER — ATORVASTATIN CALCIUM 80 MG/1
80 TABLET, FILM COATED ORAL NIGHTLY
Status: DISCONTINUED | OUTPATIENT
Start: 2023-12-30 | End: 2023-12-30

## 2023-12-30 RX ORDER — HEPARIN SODIUM 5000 [USP'U]/ML
7500 INJECTION, SOLUTION INTRAVENOUS; SUBCUTANEOUS EVERY 8 HOURS SCHEDULED
Status: DISCONTINUED | OUTPATIENT
Start: 2023-12-30 | End: 2023-12-31

## 2023-12-30 RX ORDER — ATORVASTATIN CALCIUM 20 MG/1
20 TABLET, FILM COATED ORAL NIGHTLY
Status: DISCONTINUED | OUTPATIENT
Start: 2023-12-30 | End: 2023-12-31

## 2023-12-30 NOTE — PHYSICAL THERAPY NOTE
PHYSICAL THERAPY EVALUATION - INPATIENT     Room Number: 9416/9950-N  Evaluation Date: 12/30/2023  Type of Evaluation: Initial  Physician Order: PT Eval and Treat    Presenting Problem: Acute CVA  Co-Morbidities : Hypothyroidism, HTN, HDL, chronic lymphdema  Reason for Therapy: Mobility Dysfunction and Discharge Planning    History related to current admission: Patient is a 68year old female admitted on 12/29/2023 from [facility] for Facial drooping, Slurred speech, and weakness. Pt diagnosed with Acute CVA. 12/4-12/8 Generalized edema discharge SNF  10/22-10/27 BLE cellulitis discharge [facility]    MRI stroke  FINDINGS:       6 x 4 mm focus of restricted diffusion in the posterior left thalamus image 16. Another focus of restricted diffusion 10 x 5 mm on image 19, involving the right corona radiata. On image just below this image 18 a component of the same lesion measures 5  x 3 mm. No large or territorial area of restricted diffusion seen. Impression   CONCLUSION:  Small foci of BILATERAL restricted diffusion in the cerebrum, consistent with recent infarcts, as described above. ASSESSMENT   In this PT evaluation, the patient presents with the following impairments General weakness, decrease standing balance, pain. These impairments and comorbidities manifest themselves as functional limitations in independent bed mobility, transfers, and gait. The patient is below baseline and would benefit from skilled inpatient PT to address the above deficits to assist patient in returning to prior to level of function. Functional outcome measures completed include [facility]. The AM-PAC '6-Clicks' Inpatient Basic Mobility Short Form was completed and this patient is demonstrating a Approx Degree of Impairment: 64.91%  degree of impairment in mobility. Research supports that patients with this level of impairment may benefit from Sub Acute Rehabilitation.     DISCHARGE RECOMMENDATIONS  PT Discharge Recommendations: Sub-acute rehabilitation    PLAN  PT Treatment Plan: Bed mobility; Body mechanics; Endurance;Gait training;Strengthening;Transfer training;Balance training  Rehab Potential : Good  Frequency (Obs): 3-5x/week  Number of Visits to Meet Established Goals: 3      CURRENT GOALS    Goal #1 Patient is able to demonstrate supine - sit EOB @ level: supervision     Goal #2 Patient is able to demonstrate transfers EOB to/from [facility] at assistance level: supervision     Goal #3 Patient is able to ambulate 40 feet with assist device: walker - rolling at assistance level: modified independent     Goal #4    Goal #5    Goal #6    Goal Comments: Goals established on 12/30/2023    HOME SITUATION  Type of Home: [address] [place] [address]: One level                Lives With: Family (Brother)  Drives: No  Patient Owned Equipment: Rolling walker       Prior Level of [place]: Pt reports that she lives in [facility] with her brother. Pt states that she ambulates using the RW and does not need assistance with her ADLs. Pt at this time is at a [facility] since previous hospitalization. Pt reports that at the [facility] pt was able ambulate 40ft using RW with w/c follow. Get up from bed with CGA and perform stand pivot transfer with CGA     SUBJECTIVE  \"My shoulders hurt\"      OBJECTIVE  Precautions: None  Fall Risk: High fall risk    WEIGHT BEARING RESTRICTION  Weight Bearing Restriction: None                PAIN ASSESSMENT  Rating: Unable to rate  Location: BUE shoulder (OA)  Management Techniques: Activity promotion    COGNITION  Overall Cognitive Status:  WFL - within functional limits    RANGE OF MOTION AND STRENGTH ASSESSMENT  Upper extremity ROM and strength are within functional limits except for the following:   Shoulder AROM in flexion, abduction and extension is limited secondary to pain. Pt's shoulder flexion and abduction was roughly 90 degrees with PROM.      Lower extremity ROM is within functional limits     Lower extremity strength is within functional limits except for the following:    Right Hip flexion  4/5  Left Hip flexion  4/5  Right Knee extension  4/5  Left Knee extension  4/5  Right Knee flexion  4/5  Left Knee flexion  4/5  Right Dorsiflexion  4/5  Left Dorsiflexion  4/5      BALANCE  Static Sitting: Fair +  Dynamic Sitting: Fair  Static Standing: Fair -  Dynamic Standing: Fair -    ADDITIONAL TESTS  Additional Tests: Modified [place]              Modified [place]: 4                  ACTIVITY TOLERANCE                         O2 WALK       NEUROLOGICAL FINDINGS  Neurological Findings: Sensation           Sensation: Symmetrical to touch         AM-PAC '6-Clicks' INPATIENT SHORT FORM - BASIC MOBILITY  How much difficulty does the patient currently have. .. Patient Difficulty: Turning over in bed (including adjusting bedclothes, sheets and blankets)?: A Little   Patient Difficulty: Sitting down on and standing up from a chair with arms (e.g., wheelchair, bedside commode, etc.): A Little   Patient Difficulty: Moving from lying on back to sitting on the side of the bed?: A Lot   How much help from another person does the patient currently need. .. Help from Another: Moving to and from a bed to a chair (including a wheelchair)?: A Lot   Help from Another: Need to walk in hospital room?: A Lot   Help from Another: Climbing 3-5 steps with a railing?: Total       AM-PAC Score:  Raw Score: 13   Approx Degree of Impairment: 64.91%   Standardized Score (AM-PAC Scale): 36.74   CMS Modifier (G-Code): CL    FUNCTIONAL ABILITY STATUS  Gait Assessment   Functional Mobility/Gait Assessment  Gait Assistance: Not tested    Skilled Therapy Provided     Bed Mobility:  Rolling: mod A. Pt required assistance with the LE  Supine to sit: Mod A HOB elevated. Pt required increase assistance secondary to pain in the BUE shoulder when bringing the trunk up from supine to sit   Sit to supine:  Mod A     Transfer Mobility:  Sit to stand: Min A   Stand to sit: Min A  Gait = NT    Therapist's Comments: While standing pt attempted to perform side stepping using the RW at the EOB. Pt was observed with shuffle like gait as well as decrease foot clearance. Pt required verbal and tactile cues to increase step width to improve the efficiency of the side step. Exercise/Education Provided:  Bed mobility  Body mechanics  Transfer training    Patient End of Session: In bed;Needs met;Call light within reach;RN aware of session/findings; All patient questions and concerns addressed      Patient Evaluation Complexity Level:  History Moderate - 1 or 2 personal factors and/or co-morbidities   Examination of body systems Low - addressing 1-2 elements   Clinical Presentation Low - Stable   Clinical Decision Making Low - Stable       PT Session Time: 25 minutes  Therapeutic Activity: 15 minutes

## 2023-12-30 NOTE — SLP NOTE
ADULT SWALLOWING EVALUATION    ASSESSMENT      PERTINENT BACKGROUND INFORMATION:  Patient is a 68year-old female who was admitted on 12/29/23 with Acute CVA (cerebrovascular accident) ([facility] [address] [I63.9]. Imaging results reviewed and noted below. Currently on  RA with adequate SPO2 saturations of 97%. PMH is significant for muscle weakness, cataract and HTN which may impact care. Patient resides at [facility]. Prior to this hospitalization, patient was consuming a regular (low NA per patient) diet with thin liquids. Patient indicates chief complaint of slurred speech and her arms hurting due to arthritis. Water challenge not administered due to facial droop per RN swallow screening. Patient is currently NPO. ASSESSMENT:   Patient was sleepy at first, then was wake and alert entire visit. Able to maintain midline position with adequate trunk and head control when upright in bed. Oral motor mechanism exam was remarkable for right facial droop at rest; with broad smile labial ROM WFL. Reduced coordination of oral musculature. Patient presented with mild oral dysphagia with absent signs/symptoms of aspiration during the controlled conditions of this exam.  Swallow response appeared timely, however, cannot r/o min delay. Cannot r/o pharyngeal dysphagia by this bedside exam though appeared [facility]. Please see below objective section for details. Dysphagia management recommended to reduce the likelihood of adverse events and optimize nutrition. Patient would benefit from slightly modified diet with compensatory swallow strategies and aspiration precautions. RECOMMENDATIONS/PLAN:  -  Recommend soft/easy chew diet with implementation of  swallow support strategies outlined below.    - Speech therapy services are recommended for diet texture analysis of modified diet and patient tolerance; monitor swallowing and airway patency with patient fatigue and changes in neurological status, and compensatory swallow support strategy practice to improve safety, independence for return to previous level of function and least restrictive diet, and OM exercise training  - Cognitive-communication evaluation to be completed per stroke protocol.   -  Patient/family education and training in utilization of compensatory techniques to minimize risk of aspiration  - Oral care 2-3x/day. EDUCATION:  - Patient/family verbalized understanding to results and recommendations of this evaluation and was in agreement. - Spoke with HAN Castañeda. RECOMMENDATIONS   Diet Recommendations - Solids: Soft/ Easy to chew  Diet Recommendations - Liquids: Thin Liquids                        Compensatory Strategies Recommended: Alternate consistencies (Chew on strong side (right); check for pocketing on left side; utilize tongue/finger sweep PRN)  Aspiration Precautions: Upright position; Slow rate;Small bites and sips  Medication Administration Recommendations: Whole in puree  Treatment Plan/Recommendations: Aspiration precautions;Communication evaluation  Discharge Recommendations/Plan: Undetermined    HISTORY   MEDICAL HISTORY  Reason for Referral: Stroke protocol    Problem List  Principal Problem:    Acute CVA (cerebrovascular accident) [facility])      Past Medical History  Past Medical History:   Diagnosis Date    Cataract     right eye completed; future plan for left eye procceure    Disorder of thyroid 11/9/2023    uses synthroid    Essential hypertension     High blood pressure     High cholesterol     Hx of pancreatitis     6 times    Hyperlipidemia     Lymphedema of both lower extremities     longstanding, unknown origin    Muscle weakness     Visual impairment      Past Surgical History:   Procedure Laterality Date    CATARACT      CHOLECYSTECTOMY           Prior Living Situation: Skilled nursing facility  Diet Prior to Admission: Regular; Thin liquids  Precautions: Aspiration    Patient/Family Goals: to eat/drink- \"I am hungry\"    SWALLOWING HISTORY  Current Diet Consistency: NPO  Dysphagia History: none    IMAGING  XR CHEST AP PORTABLE  CONCLUSION:    1. Mild asymmetric left perihilar faint opacity may represent atelectasis, inflammatory/infectious process cannot be excluded, correlate clinically. LOCATION:  [facility]      Dictated by (CST): Tom Pitts MD on 12/29/2023 at 11:57 AM       Finalized by (CST): Tom Pitts MD on 12/29/2023 at 11:58 AM    MRA BRAIN  CONCLUSION:  No signs of occlusion or flow limiting stenosis. Anatomic variant of extreme asymmetry vertebral arteries, with the right vertebral artery dominant, but both vertebral arteries patent. LOCATION:  [facility]      Dictated by (CST): Ayesha Crawford MD on 12/29/2023 at 2:14 PM       Finalized by (CST): Ayesha Crawford MD on 12/29/2023 at 2:16 PM    MRI STROKE BRAIN DWI ONLY(NO IV)  CONCLUSION:  Small foci of BILATERAL restricted diffusion in the cerebrum, consistent with recent infarcts, as described above. LOCATION:  [facility]      Dictated by (CST): Ayesha Crawford MD on 12/29/2023 at 2:09 PM       Finalized by (CST): Ayesha Crawford MD on 12/29/2023 at 2:14 PM          OBJECTIVE   ORAL MOTOR EXAMINATION     Symmetry: Reduced left facial  Strength: Reduced left facial     Range of Motion:  (Left facial droop at rest; [facility] with broad smile; tongue ROM apeeared WFL with lateralization, elevation and depression)  Rate of Motion: Reduced    Voice Quality: Clear  Respiratory Status: Unlabored (Room air)  Consistencies Trialed: Thin liquids;Puree;Hard solid  Method of Presentation: Staff/Clinician assistance;Spoon;Cup;Straw;Single sips  Patient Positioning: Upright;Midline    Oral Phase of Swallow: Impaired  Bolus Retrieval: Intact  Bilabial Seal: Intact  Bolus Formation: Impaired  Bolus Propulsion: Impaired  Mastication: Impaired  Retention: Impaired    Pharyngeal Phase of Swallow: Within Functional Limits           (Please note: Silent aspiration cannot be evaluated clinically.  Videofluoroscopic Swallow Study is required to rule-out silent aspiration.)    Esophageal Phase of Swallow: No complaints consistent with possible esophageal involvement  Comments:               GOALS  Goal #1 The patient will tolerate soft/easy chew consistency and thin liquids without overt signs or symptoms of aspiration with 95 % accuracy over 2-3 session(s). Goal Established         Goal #2 The patient/family/caregiver will demonstrate understanding and implementation of aspiration precautions and swallow strategies independently over 2-3 session(s). Goal Established         Goal #3 The patient will complete OMEs targeting Lingual, Labial, and Buccal strength, ROM, and coordination with 90 % accuracy within 5 session(s).   Goal Established         Goal #4 Cognitive-communication evaluation per stroke protocol    Goal Established           FOLLOW UP  Treatment Plan/Recommendations: Aspiration precautions;Communication evaluation  Number of Visits to Meet Established Goals: 5  Follow Up Needed (Documentation Required): Yes  SLP Follow-up Date: 12/31/23    Thank you for your referral.   If you have any questions, please contact Karyle Mering, SLP

## 2023-12-30 NOTE — PROGRESS NOTES
Stroke booklet given to patient; the following education was provided:     What is stroke  BEFAST - Stroke warning sings and symptoms  How to initiate EMS  Secondary stroke prevention and personalized risk factors: HTN, HL, obesity  Lifestyle modifications (nutrition and exercise)  Post-stroke recovery and follow-up  Community resources (stroke support group and non-emergent stroke line)  Patient's identified goal: N/A    No family present during education. Patient receptive to teachings. All pertinent questions and concerns were addressed. Patient has chosen brother Tg Hudson as his/her designated care partner. He/she can be reached at [phone].       RN Stroke Navigator team  [phone]

## 2023-12-30 NOTE — DISCHARGE PLANNING
Patient follow-up appointment information:     Visit Type: Stroke follow-up  Date of TIA/Stroke: 12/29/2023  Type of Stroke: Ischemic  Patient to follow-up: 4 weeks  OP Neurologist: Any available neurologist  New patient to neurology service: Yes  Anticipated discharge (if known): TBD  Discharge disposition (if known): [facility]    Please call patient's brother Ned Benson at [phone] to schedule follow-up appt.       RN Stroke Navigator team  [phone]

## 2023-12-30 NOTE — PLAN OF CARE
Assume care 1930  VSS  A&Ox4, neuro q2 till 0400; see flowsheet   NSR/SB  RA  Denies pain   NPO   IVF  Plan of care discussed with patient  All needs met  Call light within reach

## 2023-12-31 VITALS
WEIGHT: 242.5 LBS | HEART RATE: 58 BPM | DIASTOLIC BLOOD PRESSURE: 40 MMHG | RESPIRATION RATE: 13 BRPM | SYSTOLIC BLOOD PRESSURE: 100 MMHG | TEMPERATURE: 98 F | BODY MASS INDEX: 43 KG/M2 | OXYGEN SATURATION: 94 %

## 2023-12-31 LAB
GLUCOSE BLD-MCNC: 86 MG/DL (ref 70–99)
POTASSIUM SERPL-SCNC: 3.4 MMOL/L (ref 3.5–5.1)

## 2023-12-31 PROCEDURE — 99239 HOSP IP/OBS DSCHRG MGMT >30: CPT | Performed by: HOSPITALIST

## 2023-12-31 PROCEDURE — 99233 SBSQ HOSP IP/OBS HIGH 50: CPT | Performed by: OTHER

## 2023-12-31 RX ORDER — ATORVASTATIN CALCIUM 10 MG/1
20 TABLET, FILM COATED ORAL NIGHTLY
Status: SHIPPED | COMMUNITY
Start: 2023-12-31

## 2023-12-31 RX ORDER — ASPIRIN 325 MG
325 TABLET ORAL DAILY
Qty: 90 TABLET | Refills: 0 | Status: SHIPPED | OUTPATIENT
Start: 2024-01-01

## 2023-12-31 RX ORDER — POTASSIUM CHLORIDE 20 MEQ/1
40 TABLET, EXTENDED RELEASE ORAL EVERY 4 HOURS
Status: DISCONTINUED | OUTPATIENT
Start: 2023-12-31 | End: 2023-12-31

## 2023-12-31 RX ORDER — POTASSIUM CHLORIDE 1.5 G/1.58G
40 POWDER, FOR SOLUTION ORAL ONCE
Status: DISCONTINUED | OUTPATIENT
Start: 2023-12-31 | End: 2023-12-31

## 2023-12-31 RX ORDER — POTASSIUM CHLORIDE 1.5 G/1.58G
40 POWDER, FOR SOLUTION ORAL ONCE
Status: COMPLETED | OUTPATIENT
Start: 2023-12-31 | End: 2023-12-31

## 2023-12-31 NOTE — PROGRESS NOTES
12/31/23 1357   Advance Directives for Healthcare   Does the patient have:  Power of [employer] for [place] in the chart?  Yes   Healthcare Agent Appointed Yes   Healthcare Agent's Name [id number] Agent's Phone Number [phone]

## 2023-12-31 NOTE — CM/SW NOTE
Informed that patient is cleared for discharge to [facility] room 209. Nurse to call report to [phone]. [facility] arranged for 1400  [facility] form completed).   Brother Jessicalake Gonzalez notified of discharge--appreciative of call

## 2023-12-31 NOTE — PROGRESS NOTES
12/31/23 1400   Clinical Encounter Type   Visited With Patient; Health care provider   Routine Visit Introduction   Continue Visiting No   Patient's Supportive Strategies/Resources family, daren   Taxonomy   Intended Effects Build relationship of care and support   Methods Collaborate with care team member   Interventions Assist someone with Advance Directives;Silent prayer     Responded to consult for HCPOA. RN stated that patient is decisional at this time. Patient completed HCPOA and was given the original form. RN informed.

## 2023-12-31 NOTE — CM/SW NOTE
Noted numerous admissions to [facility].   [place] referral sent to determine if patient has medicare days remaining as well as the facilities ability to accept patient--PRIOR to discussion with pt/family

## 2023-12-31 NOTE — PLAN OF CARE
Assumed care of pt 0730  A+Ox4, RA, SR, no c/o pain  VSS  Consults clear for dc  Report called to Ty Doss of [address] [place]  Ambulance set up 2pm  Family aware  AVS reviewed, questions answered    Problem: NEUROLOGICAL - ADULT  Goal: Achieves stable or improved neurological status  Description: INTERVENTIONS  - Assess for and report changes in neurological status  - Initiate measures to prevent increased intracranial pressure  - Maintain blood pressure and fluid volume within ordered parameters to optimize cerebral perfusion and minimize risk of hemorrhage  - Monitor temperature, glucose, and sodium. Initiate appropriate interventions as ordered  Outcome: Progressing  Goal: Achieves maximal functionality and self care  Description: INTERVENTIONS  - Monitor swallowing and airway patency with patient fatigue and changes in neurological status  - Encourage and assist patient to increase activity and self care with guidance from PT/OT  - Encourage visually impaired, hearing impaired and aphasic patients to use assistive/communication devices  - Encourage self care through positioning meals or drinks within ROM for patient to promote autonomy  Outcome: Progressing        NURSING DISCHARGE NOTE    Discharged Rehab facility via Ambulance. Accompanied by Support staff  Belongings Taken by patient/family.

## 2023-12-31 NOTE — DISCHARGE SUMMARY
[facility] HOSPITALIST  DISCHARGE SUMMARY     Kayla Hill Patient Status:  Inpatient    1950 MRN [id number]   [facility] 7NE-A Attending David Leigh MD   Hosp Day # 2 PCP Cliff Cook     Date of Admission: 2023  Date of Discharge:   2023    Discharge Disposition: SNF Subacute Rehab    Discharge Diagnosis:  Acute bilateral CVA - left posterior thalamus and right corona radiata, ECHO without ASD  Dyslipidemia, LDL 21  Essential hypertension  Hypothyroidism  Chronic lymphedema  Chronic diastolic heart failure    History of Present Illness: Kayla Hill is a 68year old female with past medical history of hypothyroidism, hypertension, hyperlipidemia who presents ED for facial droop and slurred speech. Patient s began feeling weak around 11 AM yesterday morning during her therapy session. She began having facial drooping and slurred speech at 6 PM yesterday. She is not on any blood thinners. She denies any numbness or weakness to the lower or upper extremities, dyspnea, chest pain, headaches, dizziness. She has chronic lower extremity lymphedema. Brief Synopsis: Patient presented with slurred speech and facial droop. She was evaluated in the ER where she was found to have acute bilateral CVA - left posterior thalamus and right corona radiata for which she was admitted with neurology on consult. Asprin and Lipitor initiated. ECHO without ASD. PT/OT consulted, [facility] recommended. Plan for BR[facility] on regimen outlined below. MCT on discharge. Lace+ Score: 61  59-90 High Risk  29-58 Medium Risk  0-28   Low Risk       TCM Follow-Up Recommendation:  LACE > 58:  High Risk of readmission after discharge from the hospital.    Discharge Medication List:     Discharge Medications        CHANGE how you take these medications        Instructions Prescription details   atorvastatin 10 MG Tabs  Commonly known as: Lipitor  What changed: how much to take      Take 2 tablets (20 mg total) by mouth nightly. Refills: 0            CONTINUE taking these medications        Instructions Prescription details   Clear Eyes for Dry Eyes 1-0.25 % Soln  Generic drug: Carboxymethylcellul-Glycerin      Place 1 drop into both eyes daily as needed. Refills: 0     cyanocobalamin 1000 MCG Tabs  Commonly known as: Vitamin B12      Take 1 tablet (1,000 mcg total) by mouth daily. Refills: 0     gabapentin 100 MG Caps  Commonly known as: Neurontin      Take 1 capsule (100 mg total) by mouth. Refills: 0     levothyroxine 100 MCG Tabs  Commonly known as: Synthroid      Take 1 tablet (100 mcg total) by mouth before breakfast.   Quantity: 30 tablet  Refills: 0     lidocaine 4 % Ptch  Commonly known as: LIDODERM      Place 1 patch onto the skin 2 (two) times daily as needed (pain). Apply to back of neck topically every 12 hours for pain   Refills: 0     metoprolol tartrate 100 MG Tabs  Commonly known as: Lopressor      Take 1 tablet (100 mg total) by mouth every morning. Refills: 0     metoprolol tartrate 50 MG Tabs  Commonly known as: Lopressor      Take 1 tablet (50 mg total) by mouth at bedtime. Refills: 0     Nystatin 213984 UNIT/GM Powd      To affected area   Quantity: 60 g  Refills: 0     nystatin 383725 UNIT/ML Susp  Commonly known as: Mycostatin      Take 5 mL (500,000 Units total) by mouth 4 (four) times daily. Refills: 0     potassium chloride 20 MEQ Pack  Commonly known as: Klor-Con      Take 20 mEq by mouth every other day. Quantity: 15 packet  Refills: 0     risperiDONE 0.5 MG Tabs  Commonly known as: RisperDAL      Take 2 tablets (1 mg total) by mouth nightly. May also take 1 tablet (0.5 mg total) nightly as needed (sleep/agitation/psychosis). Quantity: 90 tablet  Refills: 0     SECURA PERSONAL SKIN CARE EX      Apply topically. Refills: 0     sodium chloride 0.65 % Soln  Commonly known as: Saline Mist      1 spray by Nasal route as needed for congestion.    Refills: 0 Torsemide 40 MG Tabs      Take 40 mg by mouth in the morning and 40 mg before bedtime. Only takes 3-4 times weekly. Quantity: 60 tablet  Refills: 0     Tylenol Extra Strength 500 MG Tabs  Generic drug: acetaminophen      Take 2 tablets (1,000 mg total) by mouth every 8 (eight) hours as needed for Pain or Fever. Refills: 0     Vitamin D 50 MCG (2000 UT) Caps      Take 1 capsule (2,000 Units total) by mouth daily. Refills: 0            STOP taking these medications      carbamide peroxide 6.5 % Soln  Commonly known as: Ear Drops Earwax Aid        famotidine 20 MG Tabs  Commonly known as: Pepcid        traMADol 50 MG Tabs  Commonly known as: Ultram                 [place]    Follow-up appointment:   [address]  [address]  [phone]        Appointments for Next 30 Days 2023 - 2024      None            -----------------------------------------------------------------------------------------------  PATIENT DISCHARGE INSTRUCTIONS: See electronic chart    Matias García MD    Total time spent on discharge plannin minutes     The [id number] makes medical notes like these available to patients in the interest of transparency. Please be advised this is a medical document. Medical documents are intended to carry relevant information, facts as evident, and the clinical opinion of the practitioner. The medical note is intended as peer to peer communication and may appear blunt or direct. It is written in medical language and may contain abbreviations or verbiage that are unfamiliar.

## 2023-12-31 NOTE — PLAN OF CARE
Assumed care of pt 0730  A+Ox4, RA, SR, c/o pain   -hips/shoulders   -repositioning PRN  Neuro Q4, NIH 9  PT consult  SLP consult  Tolerating diet  Call light in reach, needs addressed    Problem: NEUROLOGICAL - ADULT  Goal: Achieves stable or improved neurological status  Description: INTERVENTIONS  - Assess for and report changes in neurological status  - Initiate measures to prevent increased intracranial pressure  - Maintain blood pressure and fluid volume within ordered parameters to optimize cerebral perfusion and minimize risk of hemorrhage  - Monitor temperature, glucose, and sodium.  Initiate appropriate interventions as ordered  Outcome: Progressing  Goal: Achieves maximal functionality and self care  Description: INTERVENTIONS  - Monitor swallowing and airway patency with patient fatigue and changes in neurological status  - Encourage and assist patient to increase activity and self care with guidance from PT/OT  - Encourage visually impaired, hearing impaired and aphasic patients to use assistive/communication devices  - Encourage self care through positioning meals or drinks within ROM for patient to promote autonomy  Outcome: Progressing

## 2024-01-02 ENCOUNTER — PATIENT OUTREACH (OUTPATIENT)
Dept: CASE MANAGEMENT | Age: 74
End: 2024-01-02

## 2024-01-02 ENCOUNTER — SNF ADMIT/H&P (OUTPATIENT)
Dept: INTERNAL MEDICINE CLINIC | Age: 74
End: 2024-01-02

## 2024-01-02 DIAGNOSIS — R26.9 GAIT ABNORMALITY: ICD-10-CM

## 2024-01-02 DIAGNOSIS — M25.511 CHRONIC PAIN OF BOTH SHOULDERS: ICD-10-CM

## 2024-01-02 DIAGNOSIS — I89.0 LYMPHEDEMA OF BOTH LOWER EXTREMITIES: ICD-10-CM

## 2024-01-02 DIAGNOSIS — E78.5 HYPERLIPIDEMIA, UNSPECIFIED HYPERLIPIDEMIA TYPE: ICD-10-CM

## 2024-01-02 DIAGNOSIS — R53.81 PHYSICAL DECONDITIONING: ICD-10-CM

## 2024-01-02 DIAGNOSIS — R47.02 DYSPHASIA: ICD-10-CM

## 2024-01-02 DIAGNOSIS — R60.0 BILATERAL LEG EDEMA: ICD-10-CM

## 2024-01-02 DIAGNOSIS — M62.81 MUSCLE WEAKNESS: ICD-10-CM

## 2024-01-02 DIAGNOSIS — I10 ESSENTIAL HYPERTENSION: ICD-10-CM

## 2024-01-02 DIAGNOSIS — R29.810: ICD-10-CM

## 2024-01-02 DIAGNOSIS — I63.233 CEREBROVASCULAR ACCIDENT (CVA) DUE TO BILATERAL OCCLUSION OF CAROTID ARTERIES (HCC): Primary | ICD-10-CM

## 2024-01-02 DIAGNOSIS — G89.29 CHRONIC PAIN OF BOTH SHOULDERS: ICD-10-CM

## 2024-01-02 DIAGNOSIS — I63.9: ICD-10-CM

## 2024-01-02 DIAGNOSIS — M25.512 CHRONIC PAIN OF BOTH SHOULDERS: ICD-10-CM

## 2024-01-02 NOTE — PROGRESS NOTES
Neuro/Stroke - Ischemic 12/29/23 apt request (discharged 12/31) - [facility]  [address] [place] [address]  [phone]  Follow up 1 month  Apt made:  Fri 02/16 @1:15pm  Confirmed w/pt brotherLanre  Closing encounter

## 2024-01-04 ENCOUNTER — SNF VISIT (OUTPATIENT)
Dept: INTERNAL MEDICINE CLINIC | Age: 74
End: 2024-01-04

## 2024-01-04 DIAGNOSIS — R13.11 ORAL PHASE DYSPHAGIA: ICD-10-CM

## 2024-01-04 DIAGNOSIS — I89.0 LYMPHEDEMA OF BOTH LOWER EXTREMITIES: ICD-10-CM

## 2024-01-04 DIAGNOSIS — I63.9 RIGHT-SIDED CEREBROVASCULAR ACCIDENT (CVA) (HCC): ICD-10-CM

## 2024-01-04 DIAGNOSIS — R29.810: ICD-10-CM

## 2024-01-04 DIAGNOSIS — G89.29 CHRONIC PAIN OF BOTH SHOULDERS: ICD-10-CM

## 2024-01-04 DIAGNOSIS — I10 ESSENTIAL HYPERTENSION: ICD-10-CM

## 2024-01-04 DIAGNOSIS — R47.02 DYSPHASIA: ICD-10-CM

## 2024-01-04 DIAGNOSIS — M25.511 CHRONIC PAIN OF BOTH SHOULDERS: ICD-10-CM

## 2024-01-04 DIAGNOSIS — E78.5 HYPERLIPIDEMIA, UNSPECIFIED HYPERLIPIDEMIA TYPE: ICD-10-CM

## 2024-01-04 DIAGNOSIS — I63.9: ICD-10-CM

## 2024-01-04 DIAGNOSIS — I63.81 CEREBROVASCULAR ACCIDENT (CVA) OF LEFT THALAMUS (HCC): Primary | ICD-10-CM

## 2024-01-04 DIAGNOSIS — M25.512 CHRONIC PAIN OF BOTH SHOULDERS: ICD-10-CM

## 2024-01-05 NOTE — CDS QUERY
CLINICAL DOCUMENTATION CLARIFICATION     Dear Dr. Vega,   Clinical information (provided below) includes the diagnosis of acute kidney injury (ROSMERY), documented only in the H&P.  Please clarify if this is a valid secondary diagnosis or if it has been ruled out.    [  x ] ROSMERY was treated and resolved  [   ] ROSMERY was ruled out  [   ] Other - please specify:       Risk Factors/Clinical Indicators:   12/29 H&P: 73-year-old female with a history of hypothyroidism, hypertension, hyperlipidemia, and chronic lymphedema, who presented with weakness, facial droop and slurred speech. She was found to have an acute CVA and ROSMERY.    Labs:    12/6 Serum creatinine    0.76     12/7 Serum creatinine    0.86 Prior episode of care     12/8 Serum creatinine    0.91  ___________________________________________________      12/29 Serum creatinine   1.23   12/30 Serum creatinine   0.87    Current episode of care    12/31 Discharge Summary:        Acute bilateral CVA - left posterior thalamus and right corona radiata, ECHO without ASD  Dyslipidemia, LDL 21  Essential hypertension  Hypothyroidism  Chronic lymphedema  Chronic diastolic heart failure    Treatment: IV Fluids   For questions regarding this query, please contact Clinical :   Roxie Mendez RN        Cell# [phone]                          Thank you

## 2024-01-05 NOTE — CDS QUERY
CLINICAL DOCUMENTATION CLARIFICATION     Dear Dr. Vega,   Clinical information (provided below) includes documentation of a Body Mass Index of 42.96.  Can you please provide a nutritional diagnosis corresponding to this measurement?      [ x ] Morbid Obesity   [  ] Other (please specify):        Risk Factors/Clinical Indicators:    12/29 H&P: 73-year-old female with a history of hypothyroidism, hypertension, hyperlipidemia, and chronic lymphedema, who presented with weakness, facial droop and slurred speech. She was found to have an acute CVA and ROSMERY.     Recorded BMI: 42.96    For questions regarding this query, please contact Clinical :   Rxoie Mendez RN        Cell# [phone]                        Thank you!

## 2024-01-07 ENCOUNTER — TELEPHONE (OUTPATIENT)
Dept: MEDSURG UNIT | Facility: HOSPITAL | Age: 74
End: 2024-01-07

## 2024-01-07 VITALS
RESPIRATION RATE: 18 BRPM | WEIGHT: 244.38 LBS | OXYGEN SATURATION: 96 % | HEART RATE: 90 BPM | BODY MASS INDEX: 43.3 KG/M2 | HEIGHT: 63 IN | SYSTOLIC BLOOD PRESSURE: 126 MMHG | DIASTOLIC BLOOD PRESSURE: 66 MMHG | TEMPERATURE: 98 F

## 2024-01-07 NOTE — PROGRESS NOTES
7 DAYS STROKE FOLLOW-UP DATA COLLECTION    Patient Name: Angelika Cat Date: 24   : 1950 Gender: female   Date of hospital admission: 2023 Date of hospital discharge: 2023   Stroke: Ischemic Discharge disposition: [facility]     Primary source of information: Family    Method of patient follow-up: Phone call    Patient's current location: Rehab center    Current therapy status: sub acute rehab    1. Has the patient set up a 4 week follow-up appointment with neurology? Yes    2. Does the patient have any questions about prescribed medications? No      3. Does the patient have any concerns or needs? Yes, please explain: Patient's brother reports that no one told him patient needs a 30 day holter monitor at discharge. He does not know how patient will get the monitor placed since she is at a rehab facility and is not mobile. He was given the phone number to [facility] cardiology and told to call them on Monday to discuss monitor placement.    4. Is the patient interested in joining the stroke support group? No

## 2024-01-08 VITALS
DIASTOLIC BLOOD PRESSURE: 63 MMHG | TEMPERATURE: 98 F | WEIGHT: 247.63 LBS | OXYGEN SATURATION: 97 % | RESPIRATION RATE: 19 BRPM | HEART RATE: 72 BPM | BODY MASS INDEX: 44 KG/M2 | SYSTOLIC BLOOD PRESSURE: 125 MMHG

## 2024-01-08 NOTE — PROGRESS NOTES
cynthia Hong. DO 1950. APN Encounter 24 415pm    Met with patient at bedside. She reports she is weak and tires quickly.  She continues to c/o slurred speech and that her tongue feels heavy.  Patient is participating in therapy.    Angelika Vyas Dionicio  : 1950. 73 year old  female [place] [facility] [place].      [facility] Admission:    23  Discharge to [place] [facility]:  23    Chief complaint:  slurred speech, facial droop, chronic LE lymphedema    Therapy Update:  ambulates 40 feet x 2 w/ CA and w/c follow with verbal cues.  Transfers w/ CA and verbal cues for safety. OT with seated BLE strengthening exercises.    Discharge Diagnosis:  Acute bilateral CVA - left posterior thalamus and right corona radiata, ECHO without ASD  Dyslipidemia, LDL 21  Essential hypertension  Hypothyroidism  Chronic lymphedema  Chronic diastolic heart failure     HPI:  73 yr old female w/ PMH of hypothyroidism, HTN, HL who presented to the ED for facial droop and slurred speech.  Patient  began feeling weak around 11 AM yesterday morning during her therapy.   She began having facial droop & slurred speech at 6 PM yesterday.  She is not on any blood thinners.  She denied numbness or weakness to the lower or upper extremities, dyspnea, chest pain, headaches, dizziness. Pt has chronic lower extremity lymphedema.    Pt was evaluated in the ER where she was found to have acute bilateral CVA - left posterior thalamus and right corona radiata for which she was admitted; Neurology was consulted.  Asprin and Lipitor initiated. ECHO without ASD.  PT/OT consulted, [facility] was recommended and returned to [place] on .     Past Medical History:   Diagnosis Date    Cataract     right eye completed; future plan for left eye procceure    Disorder of thyroid 2023    uses synthroid    Essential hypertension     High blood pressure     High cholesterol     Hx of  pancreatitis     6 times    Hyperlipidemia     Lymphedema of both lower extremities     longstanding, unknown origin    Muscle weakness     Visual impairment      Past Surgical History:   Procedure Laterality Date    CATARACT      CHOLECYSTECTOMY       Family History   Problem Relation Age of Onset    Hypertension Father     Heart Disorder Paternal Grandfather     Psychiatric Sister     Lipids Brother     Obesity Sister      Social History     Socioeconomic History    Marital status: Single   Tobacco Use    Smoking status: Never    Smokeless tobacco: Never   Vaping Use    Vaping Use: Never used   Substance and Sexual Activity    Alcohol use: Not Currently    Drug use: Not Currently     Social Determinants of Health     Food Insecurity: No Food Insecurity (12/29/2023)    Food Insecurity     Food Insecurity: Never true   Transportation Needs: No Transportation Needs (12/29/2023)    Transportation Needs     Lack of Transportation: No   Recent Concern: Transportation Needs - Unmet Transportation Needs (11/9/2023)    Transportation Needs     Lack of Transportation: Yes   Housing Stability: Low Risk  (12/29/2023)    Housing Stability     Housing Instability: No     IMMUNIZATIONS  Immunization History   Administered Date(s) Administered    Covid-19 Vaccine Oxyrane [place] (J&J) 0.5ml 04/06/2021, 04/14/2022    FLU VAC High Dose 65 YRS & Older PRSV Free (42732) 11/17/2023      ALLERGIES:  Allergies   Allergen Reactions    Amoxicillin HIVES     Tolerates cefazolin (8/6/21)    Erythromycin HIVES    Penicillins HIVES     Tolerates rocephin in the past, tolerates cefazolin (8/6/21)      Tetracycline HIVES    Alc-Benzyl Alc-Sulfamethoxazole-Trimethoprim RASH    Sulfa Antibiotics RASH    Tetracyclines & Related RASH    Opioid Analgesics NAUSEA ONLY     Morphine       CODE STATUS:  Full Code    ADVANCED CARE PLANNING TEAM: Will need family care plan mtgs to arrange a safe discharge.    MEDICATIONS:  Atorvastatin 10 mg (20mg) at  bedtime  Aspirin 325 mg daily  Clear eyes - 1 gtt both eyes daily prn  Cyanocobalamin 1000mcg daily  Gabapentin 100 daily  Levothyroxine 100mcg daily  Lidocaine 4% patch to neck twice daily  Metoprolol tartrate 100 mg qam  Metoprolol tartrate 50 mg q hs  Nystatin 5 ml (500,000) 4 x daily  Potassium 20 meq every other day  Risperidone 0.5 tabs (2 tablets) q hs  Secura skin care prn  Sodium chloride 0.65% nasal spray; 1 spray daily  Torsemide 40 mg twice daily  Tylenol 1000 mg q 8 hrs prn  Vit D 2,000 daily     SUBJECTIVE: denies headache, chest pain, cough, SOB.  Denies chills, fevers, n/v/d/c.  C/O mild slurred speech; tongue feels heavy. C/O weakness and feeling tired.     PHYSICAL EXAM: 125/63. P 72. RR 19. POx 97%. T 97.5. Wgt 247.6# from  244.4#  GENERAL HEALTH:well developed, well nourished, in no apparent distress   LINES, TUBES, DRAINS:  none  SKIN: warm, dry  WOUND: see wound assessment per staff nurses   EYES: Pupils round and equal; no jaundice, conjunctiva normal; no nystagmus, no drainage from eyes  HENT: + L facial droop; + slurred speech. mucous membranes pink, moist.   NECK: supple; FROM  BREAST: deferred exam   RESPIRATORY: Lungs clear.  CARDIOVASCULAR:  RRR, rate 72  ABDOMEN:  normal active BS+, soft, nondistended; no guarding  :Deferred  LYMPHATIC:lymphedema   MUSCULOSKELETAL:  weakness upper and lower extremities  EXTREMITIES/VASCULAR: bilateral lymphedema  NEUROLOGIC: s/p CVA, slurred speech, L facial droop  PSYCHIATRIC: OR x 3.  Weakness, fatigued.    MEDICAL DECISION MAKING: pt able to make her own decisions; brother assists with medical decisions.      Lab Results: 1/2/24.  Wbc 8.7. HGB 12. Plts 137.  Na 141. K 4.1. Cl 105. Co2 27. Bun 28. Creqt 1.0. gfr 50.    Assessment/Plan    Acute bilateral CVA - left posterior thalamus and right corona radiata, ECHO without ASD  Aspirin 325 daily  Atorvastatin 20 mg q hs  PT/OT/ST    Dysphagia  Chopped diet  Crush meds  Speech therapist  following    Dyslipidemia, LDL 21  Atorvastatin 20 mg q hs  Trend labs    Essential hypertension  Metoprolol tartrate 100 mg every morning  Monitor blood pressure    Hypothyroidism  Levothyroxine 100 mcg every morning before breakfast    Chronic lymphedema  Compression hose  Compression wraps  Lymphedema therapy  Daily weight    Chronic diastolic heart failure  Torsemide 40 mg - 8am and 4 pm  K+ supplement 20 meq daily  Metoprolol tartrate  100 mg daily  Trend labs    Chronic pain  Tramadol 5 mg q 6 hrs prn  Lidocaine 4% external patches to neck neck    Fungal rash  Nystatin 100,000 external powder prn    Weakness/fatigue  PT/OT/SP    Dispo:  patient's goal is to return  home with assistance    FOLLOW UP APPOINTMENTS   *Follow-Up with PCP within 1-2 weeks following [facility] discharge.   *Follow-Up with specialists as recommended; neuro, cardiology.    Future Appointments   Date Time Provider Department Center   2/16/2024  1:15 PM Delfina Adame DO [facility]     *Greater than 35 minutes spent w/ patient and staff, including but not limited to/ reviewing present status, needs, abilities with disciplines, reviewing medical records, vital signs, labs, completing med rec and entering orders to establish plan of care in [facility].  Note to patient: The 21st Century Cures Act makes medical notes like these available to patients in the interest of transparency. However, this is a medical document intended as peer to peer communication. It is written in medical language and may contain abbreviations or verbiage that are unfamiliar. It may appear blunt or direct. Medical documents are intended to carry relevant information, facts as evident, and the clinical opinion of the practitioner who signs the document.    MARCELL Kraus (Daya)  01/04/24  [address] [place] [address] Post Acute Care Team

## 2024-01-08 NOTE — PROGRESS NOTES
Angelika Figueroa.    1950. APN Encounter 24. Late entry.    Mrs. Figueroa seen at bedside since return from [facility].  Patient has mild left facial droop with slurred speech.  Patient reports feeling weak and tired most of the time.    Angelika Cat  : 1950. 73 year old  female is [place] [facility] [place] strengthening.      [facility] Admission:    23  Discharge to [place] [facility]:  23    Chief complaint:  slurred speech, facial droop, chronic LE lymphedema    Discharge Diagnosis:  Acute bilateral CVA - left posterior thalamus and right corona radiata, ECHO without ASD  Dyslipidemia, LDL 21  Essential hypertension  Hypothyroidism  Chronic lymphedema  Chronic diastolic heart failure     HPI:  73 yr old female w/ PMH of hypothyroidism, HTN, HL who presented to the ED for facial droop and slurred speech.  Patient  began feeling weak around 11 AM yesterday morning during her therapy.   She began having facial droop & slurred speech at 6 PM yesterday.  She is not on any blood thinners.  She denied numbness or weakness to the lower or upper extremities, dyspnea, chest pain, headaches, dizziness. Pt has chronic lower extremity lymphedema.    Pt was evaluated in the ER where she was found to have acute bilateral CVA - left posterior thalamus and right corona radiata for which she was admitted; Neurology was consulted.  Asprin and Lipitor initiated. ECHO without ASD.  PT/OT consulted, [facility] was recommended and returned to [place] on .     Past Medical History:   Diagnosis Date    Cataract     right eye completed; future plan for left eye procceure    Disorder of thyroid 2023    uses synthroid    Essential hypertension     High blood pressure     High cholesterol     Hx of pancreatitis     6 times    Hyperlipidemia     Lymphedema of both lower extremities     longstanding, unknown origin    Muscle weakness     Visual impairment       Past Surgical History:   Procedure Laterality Date    CATARACT      CHOLECYSTECTOMY       Family History   Problem Relation Age of Onset    Hypertension Father     Heart Disorder Paternal Grandfather     Psychiatric Sister     Lipids Brother     Obesity Sister      Social History     Socioeconomic History    Marital status: Single   Tobacco Use    Smoking status: Never    Smokeless tobacco: Never   Vaping Use    Vaping Use: Never used   Substance and Sexual Activity    Alcohol use: Not Currently    Drug use: Not Currently     Social Determinants of Health     Food Insecurity: No Food Insecurity (12/29/2023)    Food Insecurity     Food Insecurity: Never true   Transportation Needs: No Transportation Needs (12/29/2023)    Transportation Needs     Lack of Transportation: No   Recent Concern: Transportation Needs - Unmet Transportation Needs (11/9/2023)    Transportation Needs     Lack of Transportation: Yes   Housing Stability: Low Risk  (12/29/2023)    Housing Stability     Housing Instability: No     IMMUNIZATIONS  Immunization History   Administered Date(s) Administered    Covid-19 Vaccine [place] (J&J) 0.5ml 04/06/2021, 04/14/2022    FLU VAC High Dose 65 YRS & Older PRSV Free (94993) 11/17/2023      ALLERGIES:  Allergies   Allergen Reactions    Amoxicillin HIVES     Tolerates cefazolin (8/6/21)    Erythromycin HIVES    Penicillins HIVES     Tolerates rocephin in the past, tolerates cefazolin (8/6/21)      Tetracycline HIVES    Alc-Benzyl Alc-Sulfamethoxazole-Trimethoprim RASH    Sulfa Antibiotics RASH    Tetracyclines & Related RASH    Opioid Analgesics NAUSEA ONLY     Morphine       CODE STATUS:  Full Code    ADVANCED CARE PLANNING TEAM: Will need family care plan mtgs to arrange a safe discharge.    MEDICATIONS:  Atorvastatin 10 mg (20mg) at bedtime  Clear eyes - 1 gtt both eyes daily prn  Cyanocobalamin 1000mcg daily  Gabapentin 100 daily  Levothyroxine 100mcg daily  Lidocaine 4% patch to neck twice  daily  Metoprolol tartrate 100 mg qam  Metoprolol tartrate 50 mg q hs  Nystatin 5 ml (500,000) 4 x daily  Potassium 20 meq every other day  Risperidone 0.5 tabs (2 tablets) q hs  Secura skin care prn  Sodium chloride 0.65% nasal spray; 1 spray daily  Torsemide 40 mg twice daily  Tylenol 1000 mg q 8 hrs prn  Vit D 2,000 daily     SUBJECTIVE: denies headache, chest pain, cough, SOB.  Denies chills, fevers, n/v/d/c.  C/O mild slurred speech; tongue feels heavy. C/O weakness and feeling tired.     PHYSICAL EXAM: 126/66. P 90. RR 18. POx 96%. T 97.5. Wgt 244.4#  GENERAL HEALTH:well developed, well nourished, in no apparent distress   LINES, TUBES, DRAINS:  none  SKIN: warm, dry  WOUND: see wound assessment,   EYES: Pupils round and equal; EOMI, no jaundice, conjunctiva normal; no nystagmus, no drainage from eyes  HENT: + L facial droop; + slurred speech. mucous membranes pink, moist.   NECK: supple; FROM  BREAST: deferred exam   RESPIRATORY: Lungs clear.  CARDIOVASCULAR:  RRR, rate 90  ABDOMEN:  normal active BS+, soft, nondistended; no guarding, no rebound tenderness.  :Deferred  LYMPHATIC:lymphedema   MUSCULOSKELETAL:  weakness upper and lower extremities  EXTREMITIES/VASCULAR: bilateral lymphedema  NEUROLOGIC: s/p CVA, slurred speech, L facial droop  PSYCHIATRIC: OR x 3.  Weakness, fatigued.    MEDICAL DECISION MAKING: pt able to make her own decisions; brother assists with medical decisions.      Lab Results: 1/2/24.  Wbc 8.7. HGB 12. Plts 137.  Na 141. K 4.1. Cl 105. Co2 27. Bun 28. Creqt 1.0. gfr 50.    Assessment/Plan    Acute bilateral CVA - left posterior thalamus and right corona radiata, ECHO without ASD  Aspirin 325 daily  Atorvastatin 20 mg q hs  PT/OT/ST    Dyslipidemia, LDL 21  Atorvastatin 20 mg q hs  Trend labs    Essential hypertension  Metoprolol tartrate 100 mg every morning  Monitor blood pressure    Hypothyroidism  Levothyroxine 100 mcg every morning before breakfast    Chronic  lymphedema  Compression hose  Compression wraps  Lymphedema therapy  Daily weight    Chronic diastolic heart failure  Torsemide 40 mg - 8am and 4 pm  K+ supplement 20 meq daily  Trend labs  Metoprolol tartrate  100 mg daily    Chronic pain  Tramadol 5 mg q 6 hrs prn  Lidocaine 4% external patches neck    Fungal rash  Nystatin 100,000 external powder prn    Weakness/fatigue  PT/OT/SP    Dispo:  patient's goal is to return  home with assistance    FOLLOW UP APPOINTMENTS   *Follow-Up with PCP within 1-2 weeks following [facility] discharge.   *Follow-Up with specialists as recommended; neuro, cardiology.    Future Appointments   Date Time Provider Department Center   2/16/2024  1:15 PM Delfina Adame,  [facility]     *Greater than 45 minutes spent w/ patient and staff, including but not limited to/ reviewing present status, needs, abilities with disciplines, reviewing medical records, vital signs, labs, completing med rec and entering orders to establish plan of care in [facility].  Note to patient: The 21st Century Cures Act makes medical notes like these available to patients in the interest of transparency. However, this is a medical document intended as peer to peer communication. It is written in medical language and may contain abbreviations or verbiage that are unfamiliar. It may appear blunt or direct. Medical documents are intended to carry relevant information, facts as evident, and the clinical opinion of the practitioner who signs the document.    MARCELL Kraus (Daya)  01/02/24  [address] [place] [address] Post Acute Care Team

## 2024-01-09 ENCOUNTER — SNF VISIT (OUTPATIENT)
Dept: INTERNAL MEDICINE CLINIC | Age: 74
End: 2024-01-09

## 2024-01-09 DIAGNOSIS — I63.9: ICD-10-CM

## 2024-01-09 DIAGNOSIS — I63.81 CEREBROVASCULAR ACCIDENT (CVA) OF LEFT THALAMUS (HCC): Primary | ICD-10-CM

## 2024-01-09 DIAGNOSIS — I89.0 LYMPHEDEMA OF BOTH LOWER EXTREMITIES: ICD-10-CM

## 2024-01-09 DIAGNOSIS — R26.9 GAIT ABNORMALITY: ICD-10-CM

## 2024-01-09 DIAGNOSIS — R47.02 DYSPHASIA: ICD-10-CM

## 2024-01-09 DIAGNOSIS — R60.0 BILATERAL LEG EDEMA: ICD-10-CM

## 2024-01-09 DIAGNOSIS — R13.11 ORAL PHASE DYSPHAGIA: ICD-10-CM

## 2024-01-09 DIAGNOSIS — R29.810: ICD-10-CM

## 2024-01-09 DIAGNOSIS — M62.81 MUSCLE WEAKNESS: ICD-10-CM

## 2024-01-09 DIAGNOSIS — R53.81 PHYSICAL DECONDITIONING: ICD-10-CM

## 2024-01-09 DIAGNOSIS — E78.5 HYPERLIPIDEMIA, UNSPECIFIED HYPERLIPIDEMIA TYPE: ICD-10-CM

## 2024-01-09 DIAGNOSIS — I63.9 RIGHT-SIDED CEREBROVASCULAR ACCIDENT (CVA) (HCC): ICD-10-CM

## 2024-01-09 PROCEDURE — 99309 SBSQ NF CARE MODERATE MDM 30: CPT | Performed by: NURSE PRACTITIONER

## 2024-01-09 PROCEDURE — 1111F DSCHRG MED/CURRENT MED MERGE: CPT | Performed by: NURSE PRACTITIONER

## 2024-01-10 VITALS
RESPIRATION RATE: 16 BRPM | OXYGEN SATURATION: 99 % | DIASTOLIC BLOOD PRESSURE: 77 MMHG | SYSTOLIC BLOOD PRESSURE: 124 MMHG | HEART RATE: 78 BPM | WEIGHT: 252.63 LBS | BODY MASS INDEX: 45 KG/M2 | TEMPERATURE: 96 F

## 2024-01-11 ENCOUNTER — SNF VISIT (OUTPATIENT)
Dept: INTERNAL MEDICINE CLINIC | Age: 74
End: 2024-01-11

## 2024-01-11 DIAGNOSIS — L98.421 SKIN ULCER OF SACRUM, LIMITED TO BREAKDOWN OF SKIN (HCC): ICD-10-CM

## 2024-01-11 DIAGNOSIS — I89.0 LYMPHEDEMA OF BOTH LOWER EXTREMITIES: ICD-10-CM

## 2024-01-11 DIAGNOSIS — R47.02 DYSPHASIA: ICD-10-CM

## 2024-01-11 DIAGNOSIS — M62.81 MUSCLE WEAKNESS: ICD-10-CM

## 2024-01-11 DIAGNOSIS — R13.11 ORAL PHASE DYSPHAGIA: ICD-10-CM

## 2024-01-11 DIAGNOSIS — E78.5 HYPERLIPIDEMIA, UNSPECIFIED HYPERLIPIDEMIA TYPE: ICD-10-CM

## 2024-01-11 DIAGNOSIS — I63.9: ICD-10-CM

## 2024-01-11 DIAGNOSIS — R29.810: ICD-10-CM

## 2024-01-11 DIAGNOSIS — R53.81 PHYSICAL DECONDITIONING: ICD-10-CM

## 2024-01-11 DIAGNOSIS — I63.81 CEREBROVASCULAR ACCIDENT (CVA) OF LEFT THALAMUS (HCC): Primary | ICD-10-CM

## 2024-01-11 NOTE — PROGRESS NOTES
Angelika Hong.   [birth year]. APRN Encounter 24 3 pm    Met with patient at bedside to discuss order for an event monitor.   This writer contacted central scheduling at   to set up appointment.  Office informed this APN that there are no event monitors available.  Office will contact this APN and/or ADON when monitors become available to set up an appointment.    Phone #'s and message left with office.  Patient is not happy with waiting but understands.  Informed  who will contact patient's brother to inform of same.     Angelika Cat  : 1950. 73 year old  female is [place] [facility] [place].      [facility] Admission:    23  Discharge to [facility]:  23    Chief complaint today:  slurred speech, L facial droop, chronic LE lymphedema,  Anxiety regarding discharge plans.    Therapy Update:  ambulates 25 ft x 1. 30 ft x 2 with CGA, wheelchair follow with verbal cues.  Transfers w/ CGA and verbal cues for safety. OT with seated BLE strengthening exercises.    Discharge Diagnosis:  Acute bilateral CVA - left posterior thalamus and right corona radiata, ECHO without ASD  Dyslipidemia, LDL 21  Essential hypertension  Hypothyroidism  Chronic lymphedema  Chronic diastolic heart failure     HPI:  73 yr old female w/ PMH of hypothyroidism, HTN, HL who presented to the ED for facial droop and slurred speech.  Patient  began feeling weak around 11 AM yesterday morning during her therapy.   She began having facial droop & slurred speech at 6 PM yesterday.  She is not on any blood thinners.  She denied numbness or weakness to the lower or upper extremities, dyspnea, chest pain, headaches, dizziness. Pt has chronic lower extremity lymphedema.    Pt was evaluated in the ER where she was found to have acute bilateral CVA - left posterior thalamus and right corona radiata for which she was admitted; Neurology was  consulted.  Asprin and Lipitor initiated. ECHO without ASD.  PT/OT consulted, [facility] was recommended and returned to [place] on 12/31/2.     Past Medical History:   Diagnosis Date    Cataract     right eye completed; future plan for left eye procceure    Disorder of thyroid 11/9/2023    uses synthroid    Essential hypertension     High blood pressure     High cholesterol     Hx of pancreatitis     6 times    Hyperlipidemia     Lymphedema of both lower extremities     longstanding, unknown origin    Muscle weakness     Visual impairment      Past Surgical History:   Procedure Laterality Date    CATARACT      CHOLECYSTECTOMY       Family History   Problem Relation Age of Onset    Hypertension Father     Heart Disorder Paternal Grandfather     Psychiatric Sister     Lipids Brother     Obesity Sister      Social History     Socioeconomic History    Marital status: Single   Tobacco Use    Smoking status: Never    Smokeless tobacco: Never   Vaping Use    Vaping Use: Never used   Substance and Sexual Activity    Alcohol use: Not Currently    Drug use: Not Currently     Social Determinants of Health     Food Insecurity: No Food Insecurity (12/29/2023)    Food Insecurity     Food Insecurity: Never true   Transportation Needs: No Transportation Needs (12/29/2023)    Transportation Needs     Lack of Transportation: No   Recent Concern: Transportation Needs - Unmet Transportation Needs (11/9/2023)    Transportation Needs     Lack of Transportation: Yes   Housing Stability: Low Risk  (12/29/2023)    Housing Stability     Housing Instability: No     IMMUNIZATIONS  Immunization History   Administered Date(s) Administered    Covid-19 Vaccine Ulympix (J&J) 0.5ml 04/06/2021, 04/14/2022    FLU VAC High Dose 65 YRS & Older PRSV Free (06734) 11/17/2023      ALLERGIES:  Allergies   Allergen Reactions    Amoxicillin HIVES     Tolerates cefazolin (8/6/21)    Erythromycin HIVES    Penicillins HIVES     Tolerates rocephin in the past,  tolerates cefazolin (8/6/21)      Tetracycline HIVES    Alc-Benzyl Alc-Sulfamethoxazole-Trimethoprim RASH    Sulfa Antibiotics RASH    Tetracyclines & Related RASH    Opioid Analgesics NAUSEA ONLY     Morphine       CODE STATUS:  Full Code    ADVANCED CARE PLANNING TEAM: Will need family care plan mtgs to arrange a safe discharge.    MEDICATIONS:  Atorvastatin 10 mg (20mg) at bedtime  Aspirin 325 mg daily  Clear eyes - 1 gtt both eyes daily prn  Cyanocobalamin 1000mcg daily  Gabapentin 100 daily  Levothyroxine 100mcg daily  Lidocaine 4% patch to neck twice daily  Metoprolol tartrate 100 mg qam  Metoprolol tartrate 50 mg q hs  Nystatin 5 ml (500,000) 4 x daily  Potassium 20 meq every other day  Risperidone 0.5 tabs (2 tablets) q hs  Secura skin care prn  Sodium chloride 0.65% nasal spray; 1 spray daily  Torsemide 40 mg twice daily  Tylenol 1000 mg q 8 hrs prn  Vit D 2,000 daily     SUBJECTIVE: denies headache, chest pain, cough, SOB.  Denies chills, fevers, n/v/d/c.  C/O mild slurred speech; tongue feels heavy. C/O feeling overwhelmed with planning discharge.     PHYSICAL EXAM:   124/77. P 78. RR 16. POx 99%. T 96.4. wgt 252.6# from  247.6#, 244.4#  GENERAL HEALTH:well developed, well nourished, in no apparent distress   LINES, TUBES, DRAINS:  none  SKIN: warm, dry  WOUND:  sacral pressure ulcer.  See wound assessment per staff nurses   EYES: Pupils round and equal;no jaundice, conjunctiva normal; no eye drainage. HENT: + L facial droop; + slurred speech. mucous membranes pink, moist.   NECK: supple; FROM  BREAST: deferred exam   RESPIRATORY: Lungs clear.  CARDIOVASCULAR:  RRR, rate 78  ABDOMEN:  normal active BS+, soft, nondistended; no guarding  :Deferred  LYMPHATIC:lymphedema   MUSCULOSKELETAL:  weakness upper and lower extremities  EXTREMITIES/VASCULAR: bilateral lymphedema  NEUROLOGIC: s/p CVA, slurred speech, L facial droop  PSYCHIATRIC: OR x 3. + Weakness  & fatigue.    MEDICAL DECISION MAKING: pt able to  make her own decisions; brother assists with medical decisions an discharge planning.      Lab Results: 1/9/24.  Wbc 9.5. HGB 1lts 160.   Na 141. K 3.3. Cl 103. Co2 29. Bun 31. Creat 1.1. gfr 49.    Assessment/Plan    Acute bilateral CVA - left posterior thalamus and right corona radiata  Aspirin 325 daily  Atorvastatin 20 mg q hs  PT/OT/ST    Dysphagia  Chopped diet  Crush meds  Speech therapist following    Dyslipidemia, LDL 21  Atorvastatin 20 mg q hs  Trend labs    Hypokalemia  Potassium 20 meq daily    Essential hypertension  Metoprolol tartrate 100 mg every morning  Monitor blood pressure    Hypothyroidism  Levothyroxine 100 mcg every morning before breakfast    Chronic lymphedema  Compression hose  Compression wraps  Lymphedema therapy  Daily weight    Chronic diastolic heart failure  Torsemide 40 mg - 8am and 4 pm  K+ supplement 20 meq daily  Metoprolol tartrate  100 mg daily  Trend labs    Chronic pain  Tramadol 5 mg q 6 hrs prn  Lidocaine 4% external patches to neck neck    Fungal rash  Nystatin 100,000 external powder prn    Weakness/fatigue  PT/OT/SP    Dispo:  patient's goal is to return  home with assistance and home health.  Discussed services through [employer], [employer] [facility] [address], and local [facility] services.    FOLLOW UP APPOINTMENTS   *Follow-Up with PCP within 1-2 weeks following [facility] discharge.   *Follow-Up with specialists as recommended; neuro, cardiology.    Future Appointments   Date Time Provider Department Center   2/16/2024  1:15 PM Delfina Adame DO [facility]     *Greater than 35 minutes spent w/ patient and staff, including but not limited to/ reviewing present status, needs, abilities with disciplines, reviewing medical records, vital signs, labs, completing med rec and entering orders to establish plan of care in [facility].      Note to patient: The 21st Century Cures Act makes medical notes like these available to patients in the interest of transparency.  However, this is a medical document intended as peer to peer communication. It is written in medical language and may contain abbreviations or verbiage that are unfamiliar. It may appear blunt or direct. Medical documents are intended to carry relevant information, facts as evident, and the clinical opinion of the practitioner who signs the document.    MARCELL Kraus (Daya)  01/09/24  [facility] Post Acute Care Team

## 2024-01-15 VITALS
OXYGEN SATURATION: 99 % | WEIGHT: 254.19 LBS | HEART RATE: 68 BPM | SYSTOLIC BLOOD PRESSURE: 154 MMHG | BODY MASS INDEX: 45 KG/M2 | DIASTOLIC BLOOD PRESSURE: 84 MMHG | TEMPERATURE: 98 F | RESPIRATION RATE: 17 BRPM

## 2024-01-16 ENCOUNTER — SNF VISIT (OUTPATIENT)
Dept: INTERNAL MEDICINE CLINIC | Age: 74
End: 2024-01-16

## 2024-01-16 DIAGNOSIS — I89.0 LYMPHEDEMA OF BOTH LOWER EXTREMITIES: ICD-10-CM

## 2024-01-16 DIAGNOSIS — R29.810: ICD-10-CM

## 2024-01-16 DIAGNOSIS — R45.89 ANXIETY ABOUT HEALTH: ICD-10-CM

## 2024-01-16 DIAGNOSIS — R26.9 GAIT ABNORMALITY: ICD-10-CM

## 2024-01-16 DIAGNOSIS — L98.421 SKIN ULCER OF SACRUM, LIMITED TO BREAKDOWN OF SKIN (HCC): ICD-10-CM

## 2024-01-16 DIAGNOSIS — I63.9: ICD-10-CM

## 2024-01-16 DIAGNOSIS — R47.02 DYSPHASIA: ICD-10-CM

## 2024-01-16 DIAGNOSIS — R53.81 PHYSICAL DECONDITIONING: ICD-10-CM

## 2024-01-16 DIAGNOSIS — R13.11 ORAL PHASE DYSPHAGIA: ICD-10-CM

## 2024-01-16 DIAGNOSIS — I63.81 CEREBROVASCULAR ACCIDENT (CVA) OF LEFT THALAMUS (HCC): Primary | ICD-10-CM

## 2024-01-16 PROCEDURE — 99309 SBSQ NF CARE MODERATE MDM 30: CPT | Performed by: NURSE PRACTITIONER

## 2024-01-16 PROCEDURE — 1111F DSCHRG MED/CURRENT MED MERGE: CPT | Performed by: NURSE PRACTITIONER

## 2024-01-16 NOTE — PROGRESS NOTES
Angelika Hong.   1950. APRN Encounter 24. 5 pm    Met with patient at bedside.  She is anxious and worried about discharging soon. She is still not sure if she is going to her apartment or an assisted living facility for long term care and treatment.   Discussed plans for receiving an event monitor.   Arrangements were made to f/u with Dr. Ramone Olson at 2 pm in [place] on 24.      Angelika Cat  : 1950. 73 year old  female is admitted to [facility] for rehabilitation and strengthening.      [facility] Admission:    23  Discharge to [place] [facility]:  23    Chief complaint today:  slurred speech, L facial droop, chronic LE lymphedema,  Anxiety regarding discharge plans.    Discharge Diagnosis:  Acute bilateral CVA - left posterior thalamus and right corona radiata, ECHO without ASD  Dyslipidemia, LDL 21  Essential hypertension  Hypothyroidism  Chronic lymphedema  Chronic diastolic heart failure     HPI:  73 yr old female w/ PMH of hypothyroidism, HTN, HL who presented to the ED for facial droop and slurred speech.  Patient  began feeling weak around 11 AM yesterday morning during her therapy.   She began having facial droop & slurred speech at 6 PM yesterday.  She is not on any blood thinners.  She denied numbness or weakness to the lower or upper extremities, dyspnea, chest pain, headaches, dizziness. Pt has chronic lower extremity lymphedema.    Pt was evaluated in the ER where she was found to have acute bilateral CVA - left posterior thalamus and right corona radiata for which she was admitted; Neurology was consulted.  Asprin and Lipitor initiated. ECHO without ASD.  PT/OT consulted, [facility] was recommended and returned to [place] on .     Past Medical History:   Diagnosis Date    Cataract     right eye completed; future plan for left eye procceure    Disorder of thyroid 2023    uses synthroid    Essential hypertension     High  blood pressure     High cholesterol     Hx of pancreatitis     6 times    Hyperlipidemia     Lymphedema of both lower extremities     longstanding, unknown origin    Muscle weakness     Visual impairment      Past Surgical History:   Procedure Laterality Date    CATARACT      CHOLECYSTECTOMY       Family History   Problem Relation Age of Onset    Hypertension Father     Heart Disorder Paternal Grandfather     Psychiatric Sister     Lipids Brother     Obesity Sister      Social History     Socioeconomic History    Marital status: Single   Tobacco Use    Smoking status: Never    Smokeless tobacco: Never   Vaping Use    Vaping Use: Never used   Substance and Sexual Activity    Alcohol use: Not Currently    Drug use: Not Currently     Social Determinants of Health     Food Insecurity: No Food Insecurity (12/29/2023)    Food Insecurity     Food Insecurity: Never true   Transportation Needs: No Transportation Needs (12/29/2023)    Transportation Needs     Lack of Transportation: No   Recent Concern: Transportation Needs - Unmet Transportation Needs (11/9/2023)    Transportation Needs     Lack of Transportation: Yes   Housing Stability: Low Risk  (12/29/2023)    Housing Stability     Housing Instability: No     IMMUNIZATIONS  Immunization History   Administered Date(s) Administered    Covid-19 Vaccine [employer] 0.5ml 04/06/2021, 04/14/2022    FLU VAC High Dose 65 YRS & Older PRSV Free (94970) 11/17/2023      ALLERGIES:  Allergies   Allergen Reactions    Amoxicillin HIVES     Tolerates cefazolin (8/6/21)    Erythromycin HIVES    Penicillins HIVES     Tolerates rocephin in the past, tolerates cefazolin (8/6/21)      Tetracycline HIVES    Alc-Benzyl Alc-Sulfamethoxazole-Trimethoprim RASH    Sulfa Antibiotics RASH    Tetracyclines & Related RASH    Opioid Analgesics NAUSEA ONLY     Morphine       CODE STATUS:  Full Code    ADVANCED CARE PLANNING TEAM: Will need family mtgs to arrange a safe  discharge.    MEDICATIONS:  Atorvastatin 10 mg (20mg) at bedtime  Aspirin 325 mg daily  Clear eyes - 1 gtt both eyes daily prn  Cyanocobalamin 1000mcg daily  Gabapentin 100 daily  Levothyroxine 100mcg daily  Lidocaine 4% patch to neck twice daily  Metoprolol tartrate 100 mg qam  Metoprolol tartrate 50 mg q hs  Nystatin 5 ml (500,000) 4 x daily  Potassium 20 meq every other day  Risperidone 0.5 tabs (2 tablets) q hs  Secura skin care prn  Sodium chloride 0.65% nasal spray; 1 spray daily  Torsemide 40 mg twice daily  Tylenol 1000 mg q 8 hrs prn  Vit D 2,000 daily     SUBJECTIVE: denies headache, chest pain, cough, SOB.  Denies chills, fevers, n/v/d/c.  C/O mild slurred speech.  C/O feeling overwhelmed and anxious with planning discharge.     PHYSICAL EXAM: 154/84. P 68. RR 17i. POx 99%. T 98.2  124/77. P 78. RR 16. POx 99%. T 96.4. wgt 254.2# from 252.6#, 247.6#.  GENERAL HEALTH:well developed, well nourished, in no apparent distress   LINES, TUBES, DRAINS:  none  SKIN: warm, dry  WOUND:  sacral pressure ulcer.  See wound assessment per staff nurses   EYES: Pupils round and equal;no jaundice, conjunctiva normal; no eye drainage. HENT: + L facial droop; + slurred speech. mucous membranes pink, moist.   NECK: supple; FROM  BREAST: deferred exam   RESPIRATORY: Lungs clear.  CARDIOVASCULAR:  RRR, rate 68  ABDOMEN:  normal active BS+, soft, nondistended; no guarding  :Deferred  LYMPHATIC:lymphedema   MUSCULOSKELETAL:  weakness upper and lower extremities; difficult to progress in therapy.    EXTREMITIES/VASCULAR: bilateral lymphedema  NEUROLOGIC: s/p CVA, slurred speech, L facial droop. Thought process slow.  PSYCHIATRIC: OR x 3. Periods of confusion.     MEDICAL DECISION MAKING: pt needs assistance in make decisions; slow to respond to tasks and instructions; brother assists with complex medical decisions.      Lab Results: 1/9/24.  WBC 9.5. HGB Platelets 160.   Na 141. K 3.3. Cl 103. Co2 29. Bun 31. Creat 1.1. gfr  49.    Assessment/Plan    Acute bilateral CVA - left posterior thalamus and right corona radiata  Aspirin 325 daily  Atorvastatin 20 mg q hs  PT/OT/ST    Dysphagia/slurred speech/ L facial droop  Chopped diet  Crush meds  Speech therapist following    Dyslipidemia, LDL 21  Atorvastatin 20 mg q hs  Trend labs    Hypokalemia  Potassium 20 meq daily    Essential hypertension  Metoprolol tartrate 100 mg every morning  Monitor blood pressure    Hypothyroidism  Levothyroxine 100 mcg every morning before breakfast    Chronic lymphedema  Compression hose  Compression wraps  Lymphedema therapy  Daily weight    Chronic diastolic heart failure  Torsemide 40 mg - 8am and 4 pm  K+ supplement 20 meq daily  Metoprolol tartrate  100 mg daily  Trend labs    Chronic pain  Tramadol 5 mg q 6 hrs prn  Lidocaine 4% external patches to neck neck    Fungal rash  Nystatin 100,000 external powder prn    Weakness/fatigue  PT/OT/SP    Dispo:  patient's goal is to return home with assistance and home health; possible assisted living facility.    FOLLOW UP APPOINTMENTS   *Follow-Up with PCP within 1-2 weeks following [facility] discharge.   *Follow-Up with specialists as recommended; neuro, cardiology.    Future Appointments   Date Time Provider Department Center   2/16/2024  1:15 PM Delfina Adame DO [facility]     *Greater than 35 minutes spent w/ patient and staff, including but not limited to/ reviewing present status, needs, abilities with disciplines, reviewing medical records, vital signs, labs, completing med rec and entering orders to establish plan of care in [facility].        Note to patient: The 21st Century Cures Act makes medical notes like these available to patients in the interest of transparency. However, this is a medical document intended as peer to peer communication. It is written in medical language and may contain abbreviations or verbiage that are unfamiliar. It may appear blunt or direct. Medical documents are intended to  carry relevant information, facts as evident, and the clinical opinion of the practitioner who signs the document.    MARCELL Kraus (Daya)  01/11/24  [facility] Post Acute Care Team

## 2024-01-18 ENCOUNTER — SNF VISIT (OUTPATIENT)
Dept: INTERNAL MEDICINE CLINIC | Age: 74
End: 2024-01-18

## 2024-01-18 DIAGNOSIS — I63.9: ICD-10-CM

## 2024-01-18 DIAGNOSIS — R29.810: ICD-10-CM

## 2024-01-18 DIAGNOSIS — R13.11 ORAL PHASE DYSPHAGIA: ICD-10-CM

## 2024-01-18 DIAGNOSIS — I63.81 CEREBROVASCULAR ACCIDENT (CVA) OF LEFT THALAMUS (HCC): Primary | ICD-10-CM

## 2024-01-18 DIAGNOSIS — R47.02 DYSPHASIA: ICD-10-CM

## 2024-01-18 DIAGNOSIS — I89.0 LYMPHEDEMA OF BOTH LOWER EXTREMITIES: ICD-10-CM

## 2024-01-18 DIAGNOSIS — R45.89 ANXIETY ABOUT HEALTH: ICD-10-CM

## 2024-01-18 DIAGNOSIS — R26.9 GAIT ABNORMALITY: ICD-10-CM

## 2024-01-18 DIAGNOSIS — M62.81 MUSCLE WEAKNESS: ICD-10-CM

## 2024-01-18 DIAGNOSIS — L98.421 SKIN ULCER OF SACRUM, LIMITED TO BREAKDOWN OF SKIN (HCC): ICD-10-CM

## 2024-01-18 PROCEDURE — 1111F DSCHRG MED/CURRENT MED MERGE: CPT | Performed by: NURSE PRACTITIONER

## 2024-01-18 PROCEDURE — 99309 SBSQ NF CARE MODERATE MDM 30: CPT | Performed by: NURSE PRACTITIONER

## 2024-01-19 VITALS
HEART RATE: 72 BPM | OXYGEN SATURATION: 96 % | TEMPERATURE: 98 F | DIASTOLIC BLOOD PRESSURE: 80 MMHG | WEIGHT: 258 LBS | RESPIRATION RATE: 18 BRPM | SYSTOLIC BLOOD PRESSURE: 142 MMHG | BODY MASS INDEX: 46 KG/M2

## 2024-01-19 VITALS
HEART RATE: 78 BPM | DIASTOLIC BLOOD PRESSURE: 73 MMHG | RESPIRATION RATE: 20 BRPM | SYSTOLIC BLOOD PRESSURE: 121 MMHG | OXYGEN SATURATION: 98 % | WEIGHT: 253.38 LBS | BODY MASS INDEX: 45 KG/M2 | TEMPERATURE: 98 F

## 2024-01-19 NOTE — PHYSICAL THERAPY NOTE
Inpatient consult to Hematology-Oncology  Consult performed by: SUSI Gross-CNP  Consult ordered by: Enrique Lee MD  Reason for consult: Hemophilia  Assessment/Recommendations: Ramón Flores is a 89 y.o. male presenting with cough shortness of breath for 2-3 days. PMHx of hemophilia A (follows with Dr. Dillon, FVIII activity <1%, on prophylactic Advate 40u/kg/day),  protein C deficiency, DVT(3/23- Eliquis x 3 months), AF, HTN, HLD, nephrolithiasis, CAD s/p CABG (2002), BPH, #R NOF s/p THR, SDH frequent falls, urinary retention w paraphimosis. Admitted for pneumonia treatment. H&H 12.4/38-> 11.4 /35 -> -> 127 overnight, D, dimer elevated to > 2,000, CT chest negative for PE, no obvious bleeding. Hematology is consulted to evaluate patient given hemophilia history.     Hemophilia A, also protein C deficiency  - follows with Dr. Dillon, FVIII activity <1%, on prophylactic altuviiio 250 twice weekly  - Last factor infusion, altuviiio 250 on 1/17/24, prior given 1/9/24  - frail patient with complicated medical history   - H&H and PLT trending down  - patient requires factor for any bleeding, or prior to any procedure and inpatient management with hematology every day   - Daily care of the hemophilia patient is not available at [facility], we do not monitor factor levels and dose factor daily in the event the patient requires inpatient disease treatment  - Patient will need to be transferred to [facility] for care not available at [facility]  - Order placed, discussed with Dr. Lee, discussed with Dr. Hummel (she is familiar with the patient)- patient is accepted for priority transfer  - NO HEMATOLOGY COVERAGE OVER THE WEEKEND PLEASE CALL [facility] WITH QUESTIONS OF CONCERNS  - if patient has any bleeding concerns or requires any procedures * including a kelley catheter* he will need to be transferred to [facility] for management not available at [facility] emergently- provider should call [facility] and ask for STAT emergency  PHYSICAL THERAPY EVALUATION - INPATIENT     Room Number: 426/426-A  Evaluation Date: 12/6/2023  Type of Evaluation: Initial  Physician Order: PT Eval and Treat    Presenting Problem: lymphedema  Co-Morbidities : lymphedema, HTN, HL, HT, obesity  Reason for Therapy: Mobility Dysfunction and Discharge Planning    History related to current admission: Patient is a 68year old female admitted on 12/4/2023: Presented from [place] [id number] with generalized edema    Recent Admits  11/8-11/17/23: UTI> [facility]  10/22-10/27/23: LE cellulitis > OP lymph       ASSESSMENT   In this PT evaluation, the patient presents with the following impairments 1) Pt reporting significant fear of falling throughout session, declined any falls at [facility]. Extremely fearful of sitting in bedside chair for breakfast - emotional support provided by therapist and RN as appropriate. 2) Required max A x2 for  bed mobility, CGA for STS transfer, and min A for transfer <> bedside chair with inc time and demonstrating difficulty advancing LLE. Per EMR pt ambulating 15ft x2 with RW and w/c follow 11/30 at [facility], distance comparable to pts mobility during previous admission. Baseline pt ambulates short household distances with RW. These impairments and comorbidities manifest themselves as functional limitations in independent bed mobility, transfers, and gait. The patient is below baseline and would benefit from skilled inpatient PT to address the above deficits to assist patient in returning to prior to level of function. Functional outcome measures completed include AMPAC. The AM-PAC '6-Clicks' Inpatient Basic Mobility Short Form was completed and this patient is demonstrating a Approx Degree of Impairment: 61.29%  degree of impairment in mobility. Research supports that patients with this level of impairment may benefit from [facility]. DISCHARGE RECOMMENDATIONS  PT Discharge Recommendations: Sub-acute rehabilitation    PLAN  PT Treatment Plan: Bed mobility; Body mechanics; Coordination; Endurance; Energy conservation;Patient education; Family education;Gait training;Range of motion; Neuromuscular re-educate;Strengthening;Transfer training;Balance training  Rehab Potential : Good  Frequency (Obs): 3-5x/week  Number of Visits to Meet Established Goals: 5      CURRENT GOALS    Goal #1 Patient is able to demonstrate supine - sit EOB @ level: moderate assistance     Goal #2 Patient is able to demonstrate transfers EOB to/from Chair/Wheelchair at assistance level: supervision     Goal #3 Patient is able to ambulate 20 feet with assist device: walker - rolling and chair follow at assistance level: minimum assistance     Goal #4    Goal #5    Goal #6    Goal Comments: Goals established on 12/6/2023    HOME SITUATION  Type of Home: [address] [place] [address]: One level                Lives With: Family (brother)  Drives: No  Patient Owned Equipment: Rolling walker       Prior Level of [place]: Prior to previous admit pt was at home with her brother ambulating short household distances with RW. Brother performs IADLs - groceries and laundry. Pt performs ADLs mod I. Denies any falls in the past 6 months. At rehab pt had been ambulating 15ft at [facility] with RW and chair follow. SUBJECTIVE  \" I'm so scared\"       OBJECTIVE  Precautions: Bed/chair alarm  Fall Risk: High fall risk    WEIGHT BEARING RESTRICTION  Weight Bearing Restriction: None                PAIN ASSESSMENT  Rating: Unable to rate  Location: shoulders and R knee  Management Techniques: Body mechanics; Activity promotion;Repositioning    COGNITION  Overall Cognitive Status:  WFL - within functional limits    RANGE OF MOTION AND STRENGTH ASSESSMENT  Upper extremity ROM and strength are within functional limits     Lower extremity ROM is within functional limits     Lower extremity strength is within functional limits       BALANCE  Static Sitting: Good  Dynamic Sitting: Good  Static Standing: Fair -  Dynamic Standing: Poor transfer  - US of the  LE ordered, call [facility] for transfer if positive- would not recommend starting AC without discussion with hematology    Discussed with Dr. Hummel  NO HEMATOLOGY COVERAGE OVER THE WEEKEND PLEASE CALL [facility] WITH QUESTIONS OF CONCERNS            Reason For Consult  Hemophilia history    History Of Present Illness  Ramón Flores is a 89 y.o. male presenting with cough shortness of breath for 2-3 days. PMHx of hemophilia A (follows with Dr. Dillon, FVIII activity <1%, on prophylactic altuviiio 250),  protein C deficiency, DVT(3/23- Eliquis x 3 months), AF, HTN, HLD, nephrolithiasis, CAD s/p CABG (2002), BPH, #R NOF s/p THR, SDH frequent falls, Jan, 2023 reportedly mechanical with R hip fracture status post right hip hemiarthroplasty for femoral neck fracture on 1/13/2023, had ortho fup on 1/3/24, he is WBAT of the side: right lower extremity,  Feb 2023 fall thought to be 2/2 syncope from dehydration and April 2023 fell and  sustained a forehead laceration with hematoma, patient requires Advate (factor) daily iso any trauma related bleeding, requires advate for prior to any procedure. April , 2023 patient presented to [facility] for urinary retention and developed  paraphimosis after kelley catheter removal requiring transfer to [facility] center for factor and treatment management daily not available at [facility]. Recent Opthalmology procedure 12/6/23 coordinated with Dr. Dillon. Last factor infusion, altuviiio 250 on 1/17/24, prior given 1/9/24.  Denies any sick contacts or travel. He denies leg swelling, fever, chills, chest pain, headache, any bleeding or recent fall in the last month.     In ED, EKG with AF, CT chest negative for PE, showed bilateral pleural effusions. He was given Rocephin and azithromycin. He is afebrile, VSS. Hematology is consulted for recommendations regarding his hemophilia A history.    antihemophil.FVIII,full length (ADVATE IV) 50 units per kg as needed I       antihemophilic factor rcmb PEG (Adynovate) 2,000 (+/-) unit solution 4000 unit 2 TIMES A WEEK (route: intravenous)     antihemophilic factor, recomb, (Recombinate) 250 (+/-) unit recon soln Infuse into a venous catheter.       Past Medical History  He has a past medical history of Acute deep vein thrombosis (DVT) of left femoral vein (CMS/[facility]) (09/10/2023), Acute diastolic heart failure (CMS/[facility]) (04/16/2023), Benign prostatic hyperplasia without lower urinary tract symptoms (01/07/2016), Bleeding hemorrhoids (03/01/2023), Closed nondisplaced fracture of head of left radius (09/05/2023), COVID-19 (05/21/2023), Enlarged prostate with lower urinary tract symptoms (LUTS) (03/01/2023), Fracture of bone of hip (CMS/HCC) (09/05/2023), Fracture of femoral neck, right (CMS/[facility]) (03/01/2023), Gastrointestinal hemorrhage (09/05/2023), Gingiva hemorrhage (09/05/2023), Gross hematuria (03/01/2023), 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), 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), Hematochezia (09/05/2023), Hematoma of lower extremity (09/05/2023), Hematoma of right thigh (03/01/2023), History of recent fall (05/31/2023), Knee effusion (09/05/2023), Knee effusion, left (03/01/2023), Lumbar pain (03/01/2023), Obstructive uropathy (04/28/2023), Orthostatic syncope (09/05/2023), Personal history of other endocrine, nutritional and metabolic disease, Pneumonia of right lung due to infectious organism (03/01/2023), 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    Surgical History  He has a past surgical history that includes Coronary artery bypass graft.     Social History  He reports that he has never smoked. He has never used smokeless tobacco. Alcohol use questions deferred to the physician. He reports that he does not use drugs.  Lives home alone with [facility]      Family  +    ADDITIONAL TESTS                                    ACTIVITY TOLERANCE                         O2 WALK       NEUROLOGICAL FINDINGS                        AM-PAC '6-Clicks' INPATIENT SHORT FORM - BASIC MOBILITY  How much difficulty does the patient currently have. .. Patient Difficulty: Turning over in bed (including adjusting bedclothes, sheets and blankets)?: A Lot   Patient Difficulty: Sitting down on and standing up from a chair with arms (e.g., wheelchair, bedside commode, etc.): A Little   Patient Difficulty: Moving from lying on back to sitting on the side of the bed?: A Lot   How much help from another person does the patient currently need. ..    Help from Another: Moving to and from a bed to a chair (including a wheelchair)?: A Little   Help from Another: Need to walk in hospital room?: A Little   Help from Another: Climbing 3-5 steps with a railing?: Total       AM-PAC Score:  Raw Score: 14   Approx Degree of Impairment: 61.29%   Standardized Score (AM-PAC Scale): 38.1   CMS Modifier (G-Code): CL    FUNCTIONAL ABILITY STATUS  Gait Assessment   Functional Mobility/Gait Assessment  Gait Assistance: Minimum assistance  Distance (ft): 2,2  Assistive Device: Rolling walker  Pattern:  (assist with advancing LLE and weight shifts)    Skilled Therapy Provided     Bed Mobility:  Rolling: max A x2 * pt with limited shoulder mobility - limited ability to initiate turn with UE requiring inc assist  Supine to sit: max A x2 *therapists utilized bed sheet to assist with transfer, pt assisted in initiating transfer with BLE to EOB  Sit to supine: max A x2     Transfer Mobility:  Sit to stand: CGA from bed and bedside chair with therapist blocking B feet from sliding, emotional support provided as pt reporting inc fear of falling, 3 trials performed in total    Stand to sit: CGA  Gait =  min A *assist provided with RW navigation, vc for stepping pattern and sequencing, int assist with weight shift and tactile assist "History  Family History   Problem Relation Name Age of Onset    Hemophilia Mother          Allergies  Aspirin and Penicillins    Review of Systems   Constitutional:  Positive for activity change and fatigue. Negative for chills and fever.   Respiratory:  Positive for cough and shortness of breath.    Cardiovascular:  Negative for chest pain, palpitations and leg swelling.   Gastrointestinal:  Negative for abdominal pain, nausea and vomiting.   Genitourinary:  Negative for hematuria.   Musculoskeletal:  Positive for arthralgias. Negative for joint swelling, myalgias and neck pain.   Skin:  Negative for rash.   Neurological:  Negative for dizziness, syncope, speech difficulty, weakness and headaches.   Hematological:  Negative for adenopathy. Bruises/bleeds easily.        Physical Exam  Vitals and nursing note reviewed.   Constitutional:       General: He is not in acute distress.     Appearance: He is ill-appearing. He is not toxic-appearing.   HENT:      Nose: Nose normal.      Mouth/Throat:      Mouth: Mucous membranes are moist.   Eyes:      General: No scleral icterus.  Cardiovascular:      Rate and Rhythm: Normal rate.   Pulmonary:      Effort: Pulmonary effort is normal.   Abdominal:      Tenderness: There is no abdominal tenderness.   Musculoskeletal:         General: No swelling, tenderness, deformity or signs of injury.      Cervical back: Neck supple. No tenderness.      Right lower leg: No edema.      Left lower leg: No edema.   Skin:     General: Skin is warm and dry.      Coloration: Skin is pale.      Findings: No rash.      Comments: Bruises and scratches on BL LE   Neurological:      General: No focal deficit present.      Mental Status: He is alert.   Psychiatric:         Mood and Affect: Mood normal.          Last Recorded Vitals  Blood pressure 103/53, pulse 61, temperature 36.5 °C (97.7 °F), temperature source Oral, resp. rate 12, height 1.674 m (5' 5.91\"), weight 69.1 kg (152 lb 5.4 oz), SpO2 92 " with advancement of LLE during transfer from bed > bedside chair. During return to bed pt able to advance LLE only with assist with weight shift and verbal cuing. Therapist's Comments: PT and SOT initially assists pt during transfers as noted above. Pt reported increased fear of returning to bed and requested therapist return to assist. Coordinated with RN - PT returned and assisted with transfer and bed mobility after pt finished breakfast and requested return to supine. Exercise/Education Provided:  Bed mobility  Body mechanics  Functional activity tolerated  Gait training  Transfer training    Patient End of Session: Up in chair;Needs met;Call light within reach;RN aware of session/findings; All patient questions and concerns addressed; Alarm set      Patient Evaluation Complexity Level:  History Moderate - 1 or 2 personal factors and/or co-morbidities   Examination of body systems Moderate - addressing a total of 3 or more elements   Clinical Presentation Moderate - Evolving   Clinical Decision Making Moderate - Evolving       PT Session Time: 60 minutes  Gait Training:  minutes  Therapeutic Activity: 45 minutes  Neuromuscular Re-education:  minutes  Therapeutic Exercise:  minutes %.    Relevant Results  Results for orders placed or performed during the hospital encounter of 01/18/24 (from the past 96 hour(s))   CBC and Auto Differential   Result Value Ref Range    WBC 4.7 4.4 - 11.3 x10*3/uL    nRBC 0.0 0.0 - 0.0 /100 WBCs    RBC 3.71 (L) 4.50 - 5.90 x10*6/uL    Hemoglobin 12.4 (L) 13.5 - 17.5 g/dL    Hematocrit 38.7 (L) 41.0 - 52.0 %     (H) 80 - 100 fL    MCH 33.4 26.0 - 34.0 pg    MCHC 32.0 32.0 - 36.0 g/dL    RDW 14.8 (H) 11.5 - 14.5 %    Platelets 141 (L) 150 - 450 x10*3/uL    Neutrophils % 64.5 40.0 - 80.0 %    Immature Granulocytes %, Automated 0.2 0.0 - 0.9 %    Lymphocytes % 26.0 13.0 - 44.0 %    Monocytes % 7.6 2.0 - 10.0 %    Eosinophils % 1.3 0.0 - 6.0 %    Basophils % 0.4 0.0 - 2.0 %    Neutrophils Absolute 3.05 1.60 - 5.50 x10*3/uL    Immature Granulocytes Absolute, Automated 0.01 0.00 - 0.50 x10*3/uL    Lymphocytes Absolute 1.23 0.80 - 3.00 x10*3/uL    Monocytes Absolute 0.36 0.05 - 0.80 x10*3/uL    Eosinophils Absolute 0.06 0.00 - 0.40 x10*3/uL    Basophils Absolute 0.02 0.00 - 0.10 x10*3/uL   Magnesium   Result Value Ref Range    Magnesium 1.10 (L) 1.60 - 2.40 mg/dL   Comprehensive metabolic panel   Result Value Ref Range    Glucose 129 (H) 74 - 99 mg/dL    Sodium 138 136 - 145 mmol/L    Potassium 3.8 3.5 - 5.3 mmol/L    Chloride 105 98 - 107 mmol/L    Bicarbonate 24 21 - 32 mmol/L    Anion Gap 13 10 - 20 mmol/L    Urea Nitrogen 21 6 - 23 mg/dL    Creatinine 1.05 0.50 - 1.30 mg/dL    eGFR 65 >60 mL/min/1.73m*2    Calcium 8.7 8.6 - 10.3 mg/dL    Albumin 3.9 3.4 - 5.0 g/dL    Alkaline Phosphatase 82 33 - 136 U/L    Total Protein 6.8 6.4 - 8.2 g/dL    AST 31 9 - 39 U/L    Bilirubin, Total 1.8 (H) 0.0 - 1.2 mg/dL    ALT 34 10 - 52 U/L   Troponin I, High Sensitivity   Result Value Ref Range    Troponin I, High Sensitivity 93 (HH) 0 - 20 ng/L   B-Type Natriuretic Peptide   Result Value Ref Range     (H) 0 - 99 pg/mL   Sars-CoV-2 and Influenza A/B PCR   Result Value  Ref Range    Flu A Result Not Detected Not Detected    Flu B Result Not Detected Not Detected    Coronavirus 2019, PCR Not Detected Not Detected   RSV PCR   Result Value Ref Range    RSV PCR Not Detected Not Detected   D-dimer, quantitative   Result Value Ref Range    D-Dimer Non VTE, Quant (ng/mL FEU) 2,248 (H) <=500 ng/mL FEU   Troponin I, High Sensitivity   Result Value Ref Range    Troponin I, High Sensitivity 97 (HH) 0 - 20 ng/L   Basic Metabolic Panel   Result Value Ref Range    Glucose 96 74 - 99 mg/dL    Sodium 138 136 - 145 mmol/L    Potassium 4.1 3.5 - 5.3 mmol/L    Chloride 106 98 - 107 mmol/L    Bicarbonate 23 21 - 32 mmol/L    Anion Gap 13 10 - 20 mmol/L    Urea Nitrogen 21 6 - 23 mg/dL    Creatinine 1.26 0.50 - 1.30 mg/dL    eGFR 53 (L) >60 mL/min/1.73m*2    Calcium 8.3 (L) 8.6 - 10.3 mg/dL   CBC   Result Value Ref Range    WBC 4.5 4.4 - 11.3 x10*3/uL    nRBC 0.0 0.0 - 0.0 /100 WBCs    RBC 3.40 (L) 4.50 - 5.90 x10*6/uL    Hemoglobin 11.4 (L) 13.5 - 17.5 g/dL    Hematocrit 35.1 (L) 41.0 - 52.0 %     (H) 80 - 100 fL    MCH 33.5 26.0 - 34.0 pg    MCHC 32.5 32.0 - 36.0 g/dL    RDW 14.7 (H) 11.5 - 14.5 %    Platelets 127 (L) 150 - 450 x10*3/uL     Component  Ref Range & Units 10:15  (1/19/24) 1 d ago  (1/18/24) 5 mo ago  (7/28/23) 5 mo ago  (7/24/23) 6 mo ago  (6/30/23) 8 mo ago  (4/30/23) 8 mo ago  (4/28/23)   WBC  4.4 - 11.3 x10*3/uL 4.5 4.7 5.2 R 4.8 R 6.3 R 6.8 R 5.4 R   nRBC  0.0 - 0.0 /100 WBCs 0.0 0.0  0.0 R 0.0 R 0.0 R    RBC  4.50 - 5.90 x10*6/uL 3.40 Low  3.71 Low  3.58 Low  R 3.55 Low  R 3.56 Low  R 3.65 Low  R 3.39 Low  R   Hemoglobin  13.5 - 17.5 g/dL 11.4 Low  12.4 Low  11.4 Low  11.4 Low  11.1 Low  11.7 Low  10.6 Low    Hematocrit  41.0 - 52.0 % 35.1 Low  38.7 Low  34.6 Low  35.2 Low  34.0 Low  34.2 Low  32.2 Low    MCV  80 - 100 fL 103 High  104 High  97 99 96 94 95   MCH  26.0 - 34.0 pg 33.5 33.4        MCHC  32.0 - 36.0 g/dL 32.5 32.0 32.9 32.4 32.6 34.2 32.9   RDW  11.5 - 14.5 %  14.7 High  14.8 High  16.5 High  16.5 High  16.7 High  14.0 14.2   Platelets  150 - 450 x10*3/uL 127 Low  141 Low  152 R 167 R 164 R 164 R 161 R       I spent 90 minutes in the professional and overall care of this patient.

## 2024-01-20 NOTE — PROGRESS NOTES
Angelika Hong.  50. APN Encounter 24.vida    Met with patient at bedside with MELINA to discuss her discharge plans.  Patient aware she has an appointment on 24 to get a 30 day event monitor placed.  Patient reports that she will pay privately until event monitor is placed. She will arrange 24 hour caregivers at home at discharge with home health services. This APN also made f/u appointment with PCP office. Patient has date and time; she will arrange transportation after discharge.  assisting patient on upcoming discharge to home.    Angelika Cat  : 1950. 73 year old  female is admitted to [facility] [place] and strengthening.      [facility] Admission:    23  Discharge to [facility]:  23    Chief complaint today:  slurred speech, L facial droop, chronic LE lymphedema,  Anxiety regarding discharge plans.    Discharge Diagnosis:  Acute bilateral CVA - left posterior thalamus and right corona radiata, ECHO without ASD  Dyslipidemia, LDL 21  Essential hypertension  Hypothyroidism  Chronic lymphedema  Chronic diastolic heart failure     HPI:  73 yr old female w/ PMH of hypothyroidism, HTN, HL who presented to the ED for facial droop and slurred speech.  Patient  began feeling weak around 11 AM yesterday morning during her therapy.   She began having facial droop & slurred speech at 6 PM yesterday.  She is not on any blood thinners.  She denied numbness or weakness to the lower or upper extremities, dyspnea, chest pain, headaches, dizziness. Pt has chronic lower extremity lymphedema.    Pt was evaluated in the ER where she was found to have acute bilateral CVA - left posterior thalamus and right corona radiata for which she was admitted; Neurology was consulted.  Asprin and Lipitor initiated. ECHO without ASD.  PT/OT consulted, [facility] was recommended and returned to [facility] on .     Past Medical History:   Diagnosis Date     Cataract     right eye completed; future plan for left eye procceure    Disorder of thyroid 11/9/2023    uses synthroid    Essential hypertension     High blood pressure     High cholesterol     Hx of pancreatitis     6 times    Hyperlipidemia     Lymphedema of both lower extremities     longstanding, unknown origin    Muscle weakness     Visual impairment      Past Surgical History:   Procedure Laterality Date    CATARACT      CHOLECYSTECTOMY       Family History   Problem Relation Age of Onset    Hypertension Father     Heart Disorder Paternal Grandfather     Psychiatric Sister     Lipids Brother     Obesity Sister      Social History     Socioeconomic History    Marital status: Single   Tobacco Use    Smoking status: Never    Smokeless tobacco: Never   Vaping Use    Vaping Use: Never used   Substance and Sexual Activity    Alcohol use: Not Currently    Drug use: Not Currently     Social Determinants of Health     Food Insecurity: No Food Insecurity (12/29/2023)    Food Insecurity     Food Insecurity: Never true   Transportation Needs: No Transportation Needs (12/29/2023)    Transportation Needs     Lack of Transportation: No   Recent Concern: Transportation Needs - Unmet Transportation Needs (11/9/2023)    Transportation Needs     Lack of Transportation: Yes   Housing Stability: Low Risk  (12/29/2023)    Housing Stability     Housing Instability: No     IMMUNIZATIONS  Immunization History   Administered Date(s) Administered    Covid-19 Vaccine [employer] [place] (J&J) 0.5ml 04/06/2021, 04/14/2022    FLU VAC High Dose 65 YRS & Older PRSV Free (49290) 11/17/2023      ALLERGIES:  Allergies   Allergen Reactions    Amoxicillin HIVES     Tolerates cefazolin (8/6/21)    Erythromycin HIVES    Penicillins HIVES     Tolerates rocephin in the past, tolerates cefazolin (8/6/21)      Tetracycline HIVES    Alc-Benzyl Alc-Sulfamethoxazole-Trimethoprim RASH    Sulfa Antibiotics RASH    Tetracyclines & Related RASH    Opioid Analgesics  NAUSEA ONLY     Morphine       CODE STATUS:  Full Code    ADVANCED CARE PLANNING TEAM: Will need family mtgs to arrange a safe discharge.    MEDICATIONS:  Atorvastatin 10 mg (20mg) at bedtime  Aspirin 325 mg daily  Clear eyes - 1 gtt both eyes daily prn  Cyanocobalamin 1000mcg daily  Gabapentin 100 daily  Levothyroxine 100mcg daily  Lidocaine 4% patch to neck twice daily  Metoprolol tartrate 100 mg qam  Metoprolol tartrate 50 mg q hs  Nystatin 5 ml (500,000) 4 x daily  Potassium 20 meq every other day  Risperidone 0.5 tabs (2 tablets) q hs  Secura skin care prn  Sodium chloride 0.65% nasal spray; 1 spray daily  Torsemide 40 mg twice daily  Tylenol 1000 mg q 8 hrs prn  Vit D 2,000 daily     SUBJECTIVE: denies headache, chest pain, cough, SOB.  Denies chills, fevers, n/v/d/c.  C/O mild slurred speech and aware of L facial droop.  C/O  anxiety trying to make discharge plans to home.       PHYSICAL EXAM: 121/73. P 78.  RR 20 POx 98%. T 98.  Wgt 253.4# .  GENERAL HEALTH:well developed, well nourished, in no apparent distress   LINES, TUBES, DRAINS:  none  SKIN: warm, dry  WOUND:  sacral pressure ulcer.  See wound assessment per staff nurses   EYES: Pupils round and equal;no jaundice, conjunctiva normal; no eye drainage. HENT: mild  L facial droop; + slurred speech. mucous membranes moist.   NECK: supple; FROM  BREAST: deferred exam   RESPIRATORY: Lungs clear.  CARDIOVASCULAR:  RRR, rate 78  ABDOMEN:  normal active BS+, soft, nondistended; no guarding  :Deferred  LYMPHATIC:lymphedema  Bilateral LE  MUSCULOSKELETAL:  weakness upper and lower extremities.    EXTREMITIES/VASCULAR: bilateral LE lymphedema  NEUROLOGIC: s/p CVA, slurred speech, L facial droop. Thought process slow; speaks slowly.  PSYCHIATRIC: OR x 3. Periods of forgetfulness & confusion.     MEDICAL DECISION MAKING: pt needs assistance in making decisions; slow to respond to tasks and instructions; brother assists with complex medical decisions.      Lab  Results: 1/16/24.  WBC 8.2. HGB 10.3.  Platelets 164.   Na 139.  K 3.7. Cl 104. Co2 29. Bun 37. Creat 1.1. gfr 46.    Assessment/Plan    Acute bilateral CVA - left posterior thalamus and right corona radiata  Aspirin 325 daily  Atorvastatin 20 mg q hs  PT/OT/ST    Dysphagia/slurred speech/ L facial droop  Chopped diet  Crush meds  Speech therapist following    Dyslipidemia, LDL 21  Atorvastatin 20 mg q hs  Trend labs    Hypokalemia  Potassium 20 meq daily    Essential hypertension  Metoprolol tartrate 100 mg every morning  Monitor blood pressure    Hypothyroidism  Levothyroxine 100 mcg every morning before breakfast    Sacral Pressure Ulcer  Daily wound care  F/U with wound MD weekly    Chronic lymphedema  Compression hose  Compression wraps  Lymphedema therapy  Daily weight    Chronic diastolic heart failure  Torsemide 40 mg bid - 8am and 4 pm  K+ supplement 20 meq daily  Metoprolol tartrate  100 mg daily  Trend labs    Chronic pain  Tramadol 5 mg q 6 hrs prn  Lidocaine 4% external patches to neck neck    Fungal rash  Nystatin 100,000 external powder prn    Weakness/fatigue  PT/OT/SP    Dispo:  tentative plan: pay privately until confirmed discharge plan in arranged.  Home with caregivers and home health.  Possible assisted living     FOLLOW UP APPOINTMENTS   *Follow-Up with PCP within 1-2 weeks following [facility] discharge.   *Follow-Up with specialists as recommended; neuro, cardiology.    Future Appointments   Date Time Provider Department Center   2/16/2024  1:15 PM Delfina Adame DO [facility]     *Greater than 35 minutes spent w/ patient and staff, including but not limited to/ reviewing present status, needs, abilities with disciplines, reviewing medical records, vital signs, labs, completing med rec and entering orders to establish plan of care in [facility].        Note to patient: The 21st Century Cures Act makes medical notes like these available to patients in the interest of transparency. However, this  is a medical document intended as peer to peer communication. It is written in medical language and may contain abbreviations or verbiage that are unfamiliar. It may appear blunt or direct. Medical documents are intended to carry relevant information, facts as evident, and the clinical opinion of the practitioner who signs the document.    Ellen Cisneros (Daya), MARCELL  01/18/24  530 pm  [facility] Post Acute Care Team          40y F generally healthy w/ acute-onset R sided abd pain a/w nausea/vomiting & leukocytosis w/ UVJ stone causing hydronephrosis seen on CT A/P.    -Very low suspicion for cecal bascule, intestinal obstruction or vascular compromise.   -No surgical intervention indicated at this time.  -Recommend Urology consult for UVJ stone w/ hydronephrosis in setting of leukocytosis/left-shift.  -Discussed w/ chief resident & attending on call

## 2024-01-20 NOTE — PROGRESS NOTES
Angelika Hong.   1950.  APN Encounter  24,.late entry.    Patient seen at bedside morning and the afternoon to discuss her discharge plans. Patient states she is discussing with her brother if she stood stay at [facility] for a few days until a permanent plan is established.  She is either going to return home with caregivers and home health or arrange a transfer to an assisted living facility.  She continues to feel overwhelmed with many decisions to make. , Therapy manager, ADON and physicians have assisted her in making discharge decisions.   Arrangements were made to f/u with Dr. Ramone Olson at in [place] on 24.      Angelika Cat  : 1950. 73 year old  female is admitted to [facility] for rehabilitation and strengthening.      [facility] Admission:    23  Discharge to [facility]:  23    Chief complaint today:  slurred speech, L facial droop, chronic LE lymphedema,  Anxiety regarding discharge plans.    Discharge Diagnosis:  Acute bilateral CVA - left posterior thalamus and right corona radiata, ECHO without ASD  Dyslipidemia, LDL 21  Essential hypertension  Hypothyroidism  Chronic lymphedema  Chronic diastolic heart failure     HPI:  73 yr old female w/ PMH of hypothyroidism, HTN, HL who presented to the ED for facial droop and slurred speech.  Patient  began feeling weak around 11 AM yesterday morning during her therapy.   She began having facial droop & slurred speech at 6 PM yesterday.  She is not on any blood thinners.  She denied numbness or weakness to the lower or upper extremities, dyspnea, chest pain, headaches, dizziness. Pt has chronic lower extremity lymphedema.    Pt was evaluated in the ER where she was found to have acute bilateral CVA - left posterior thalamus and right corona radiata for which she was admitted; Neurology was consulted.  Asprin and Lipitor initiated. ECHO without ASD.  PT/OT consulted, [facility]  was recommended and returned to [place] on 12/31/2.     Past Medical History:   Diagnosis Date    Cataract     right eye completed; future plan for left eye procceure    Disorder of thyroid 11/9/2023    uses synthroid    Essential hypertension     High blood pressure     High cholesterol     Hx of pancreatitis     6 times    Hyperlipidemia     Lymphedema of both lower extremities     longstanding, unknown origin    Muscle weakness     Visual impairment      Past Surgical History:   Procedure Laterality Date    CATARACT      CHOLECYSTECTOMY       Family History   Problem Relation Age of Onset    Hypertension Father     Heart Disorder Paternal Grandfather     Psychiatric Sister     Lipids Brother     Obesity Sister      Social History     Socioeconomic History    Marital status: Single   Tobacco Use    Smoking status: Never    Smokeless tobacco: Never   Vaping Use    Vaping Use: Never used   Substance and Sexual Activity    Alcohol use: Not Currently    Drug use: Not Currently     Social Determinants of Health     Food Insecurity: No Food Insecurity (12/29/2023)    Food Insecurity     Food Insecurity: Never true   Transportation Needs: No Transportation Needs (12/29/2023)    Transportation Needs     Lack of Transportation: No   Recent Concern: Transportation Needs - Unmet Transportation Needs (11/9/2023)    Transportation Needs     Lack of Transportation: Yes   Housing Stability: Low Risk  (12/29/2023)    Housing Stability     Housing Instability: No     IMMUNIZATIONS  Immunization History   Administered Date(s) Administered    Covid-19 Vaccine [place] (J&J) 0.5ml 04/06/2021, 04/14/2022    FLU VAC High Dose 65 YRS & Older PRSV Free (26778) 11/17/2023      ALLERGIES:  Allergies   Allergen Reactions    Amoxicillin HIVES     Tolerates cefazolin (8/6/21)    Erythromycin HIVES    Penicillins HIVES     Tolerates rocephin in the past, tolerates cefazolin (8/6/21)      Tetracycline HIVES    Alc-Benzyl  Alc-Sulfamethoxazole-Trimethoprim RASH    Sulfa Antibiotics RASH    Tetracyclines & Related RASH    Opioid Analgesics NAUSEA ONLY     Morphine       CODE STATUS:  Full Code    ADVANCED CARE PLANNING TEAM: Will need family mtgs to arrange a safe discharge.    MEDICATIONS:  Atorvastatin 10 mg (20mg) at bedtime  Aspirin 325 mg daily  Clear eyes - 1 gtt both eyes daily prn  Cyanocobalamin 1000mcg daily  Gabapentin 100 daily  Levothyroxine 100mcg daily  Lidocaine 4% patch to neck twice daily  Metoprolol tartrate 100 mg qam  Metoprolol tartrate 50 mg q hs  Nystatin 5 ml (500,000) 4 x daily  Potassium 20 meq every other day  Risperidone 0.5 tabs (2 tablets) q hs  Secura skin care prn  Sodium chloride 0.65% nasal spray; 1 spray daily  Torsemide 40 mg twice daily  Tylenol 1000 mg q 8 hrs prn  Vit D 2,000 daily     SUBJECTIVE: denies headache, chest pain, cough, SOB.  Denies chills, fevers, n/v/d/c.  C/O mild slurred speech and aware of L facial droop.  C/O  anxiety trying to make discharge regarding her discharge.      PHYSICAL EXAM: 142/80.  P 72.  RR 18. POx 96%. T 97.5.  Wgt 254/2# .  GENERAL HEALTH:well developed, well nourished, in no apparent distress   LINES, TUBES, DRAINS:  none  SKIN: warm, dry  WOUND:  sacral pressure ulcer.  See wound assessment per staff nurses   EYES: Pupils round and equal;no jaundice, conjunctiva normal; no eye drainage. HENT: mild  L facial droop; + slurred speech. mucous membranes pink, moist.   NECK: supple; FROM  BREAST: deferred exam   RESPIRATORY: Lungs clear.  CARDIOVASCULAR:  RRR, rate 68  ABDOMEN:  normal active BS+, soft, nondistended; no guarding  :Deferred  LYMPHATIC:lymphedema   MUSCULOSKELETAL:  weakness upper and lower extremities; difficult to progress in therapy.    EXTREMITIES/VASCULAR: bilateral LE lymphedema  NEUROLOGIC: s/p CVA, slurred speech, L facial droop. Thought process slow; speaks slowly.  PSYCHIATRIC: OR x 3. Periods of forgetfulness; confusion.     MEDICAL  DECISION MAKING: pt needs assistance in making decisions; slow to respond to tasks and instructions; brother assists with complex medical decisions.      Lab Results: 1/16/24.  WBC 8.2. HGB 10.3.  Platelets 164.   Na 139.  K 3.7. Cl 104. Co2 29. Bun 37. Creat 1.1. gfr 46.    Assessment/Plan    Acute bilateral CVA - left posterior thalamus and right corona radiata  Aspirin 325 daily  Atorvastatin 20 mg q hs  PT/OT/ST    Dysphagia/slurred speech/ L facial droop  Chopped diet  Crush meds  Speech therapist following    Dyslipidemia, LDL 21  Atorvastatin 20 mg q hs  Trend labs    Hypokalemia  Potassium 20 meq daily    Essential hypertension  Metoprolol tartrate 100 mg every morning  Monitor blood pressure    Hypothyroidism  Levothyroxine 100 mcg every morning before breakfast    Sacral Pressure Ulcer  Daily wound care  F/U with wound MD weekly    Chronic lymphedema  Compression hose  Compression wraps  Lymphedema therapy  Daily weight    Chronic diastolic heart failure  Torsemide 40 mg - 8am and 4 pm  K+ supplement 20 meq daily  Metoprolol tartrate  100 mg daily  Trend labs    Chronic pain  Tramadol 5 mg q 6 hrs prn  Lidocaine 4% external patches to neck neck    Fungal rash  Nystatin 100,000 external powder prn    Weakness/fatigue  PT/OT/SP    Dispo:  tentative plan: pay privately until confirmed discharge plan in arranged.  Home with caregivers and home health.  Possible assisted living     FOLLOW UP APPOINTMENTS   *Follow-Up with PCP within 1-2 weeks following [facility] discharge.   *Follow-Up with specialists as recommended; neuro, cardiology.    Future Appointments   Date Time Provider Department Center   2/16/2024  1:15 PM Delfina Adame DO [facility]     *Greater than 35 minutes spent w/ patient and staff, including but not limited to/ reviewing present status, needs, abilities with disciplines, reviewing medical records, vital signs, labs, completing med rec and entering orders to establish plan of care in  [facility].        Note to patient: The 21st Century Cures Act makes medical notes like these available to patients in the interest of transparency. However, this is a medical document intended as peer to peer communication. It is written in medical language and may contain abbreviations or verbiage that are unfamiliar. It may appear blunt or direct. Medical documents are intended to carry relevant information, facts as evident, and the clinical opinion of the practitioner who signs the document.    MARCELL Kraus (Daya)  01/16/24  1145 am  [facility] Post Acute Care Team

## 2024-01-23 ENCOUNTER — SNF VISIT (OUTPATIENT)
Dept: INTERNAL MEDICINE CLINIC | Age: 74
End: 2024-01-23

## 2024-01-23 DIAGNOSIS — R26.9 GAIT ABNORMALITY: ICD-10-CM

## 2024-01-23 DIAGNOSIS — R13.11 ORAL PHASE DYSPHAGIA: ICD-10-CM

## 2024-01-23 DIAGNOSIS — I63.9: ICD-10-CM

## 2024-01-23 DIAGNOSIS — L98.421 SKIN ULCER OF SACRUM, LIMITED TO BREAKDOWN OF SKIN (HCC): ICD-10-CM

## 2024-01-23 DIAGNOSIS — R47.02 DYSPHASIA: ICD-10-CM

## 2024-01-23 DIAGNOSIS — I89.0 LYMPHEDEMA OF BOTH LOWER EXTREMITIES: ICD-10-CM

## 2024-01-23 DIAGNOSIS — R45.89 ANXIETY ABOUT HEALTH: ICD-10-CM

## 2024-01-23 DIAGNOSIS — R29.810: ICD-10-CM

## 2024-01-23 DIAGNOSIS — R53.81 PHYSICAL DECONDITIONING: ICD-10-CM

## 2024-01-23 DIAGNOSIS — I63.81 CEREBROVASCULAR ACCIDENT (CVA) OF LEFT THALAMUS (HCC): ICD-10-CM

## 2024-01-23 DIAGNOSIS — Z95.9 CARDIAC DEVICE IN SITU: Primary | ICD-10-CM

## 2024-01-23 PROCEDURE — 1111F DSCHRG MED/CURRENT MED MERGE: CPT | Performed by: NURSE PRACTITIONER

## 2024-01-23 PROCEDURE — 99309 SBSQ NF CARE MODERATE MDM 30: CPT | Performed by: NURSE PRACTITIONER

## 2024-01-27 VITALS
DIASTOLIC BLOOD PRESSURE: 79 MMHG | SYSTOLIC BLOOD PRESSURE: 129 MMHG | RESPIRATION RATE: 18 BRPM | OXYGEN SATURATION: 98 % | BODY MASS INDEX: 46 KG/M2 | TEMPERATURE: 98 F | HEART RATE: 71 BPM | WEIGHT: 258 LBS

## 2024-01-27 NOTE — PROGRESS NOTES
Angelika Figueroa.  50.  APN Encounter 24. Late entry.    Met with patient at bedside.  Patient still anxious and concerned about her discharge.  She is worried she wont be able to care for her 30 day heart monitor which was placed yesterday 24 at [facility].   She reports she will stay at [facility] privately until her discharge plan is finalized and she is comfortable to discharge home or to an Assisted Living Facility. SW and staff assisting pt to arrange a final discharge.    Angelika Cat  : 1950. 73 year old  female [place] [facility] [place].      [facility] Admission:    23  Discharge to [facility]:  23    Chief complaint today:  slurred speech, L facial droop, chronic LE lymphedema,  Anxiety regarding heart monitor and  discharge plans.    Discharge Diagnosis:  Acute bilateral CVA - left posterior thalamus and right corona radiata, ECHO without ASD  Dyslipidemia, LDL 21  Essential hypertension  Hypothyroidism  Chronic lymphedema  Chronic diastolic heart failure     HPI:  73 yr old female w/ PMH of hypothyroidism, HTN, HL who presented to the ED for facial droop and slurred speech.  Patient  began feeling weak around 11 AM yesterday morning during her therapy.   She began having facial droop & slurred speech at 6 PM yesterday.  She is not on any blood thinners.  She denied numbness or weakness to the lower or upper extremities, dyspnea, chest pain, headaches, dizziness. Pt has chronic lower extremity lymphedema.    Pt was evaluated in the ER where she was found to have acute bilateral CVA - left posterior thalamus and right corona radiata for which she was admitted; Neurology was consulted.  Asprin and Lipitor initiated. ECHO without ASD.  PT/OT consulted, [facility] was recommended and returned to [facility] on .     Past Medical History:   Diagnosis Date    Cataract     right eye completed; future  plan for left eye procceure    Disorder of thyroid 11/9/2023    uses synthroid    Essential hypertension     High blood pressure     High cholesterol     Hx of pancreatitis     6 times    Hyperlipidemia     Lymphedema of both lower extremities     longstanding, unknown origin    Muscle weakness     Visual impairment      Past Surgical History:   Procedure Laterality Date    CATARACT      CHOLECYSTECTOMY       Family History   Problem Relation Age of Onset    Hypertension Father     Heart Disorder Paternal Grandfather     Psychiatric Sister     Lipids Brother     Obesity Sister      Social History     Socioeconomic History    Marital status: Single   Tobacco Use    Smoking status: Never    Smokeless tobacco: Never   Vaping Use    Vaping Use: Never used   Substance and Sexual Activity    Alcohol use: Not Currently    Drug use: Not Currently     Social Determinants of Health     Food Insecurity: No Food Insecurity (12/29/2023)    Food Insecurity     Food Insecurity: Never true   Transportation Needs: No Transportation Needs (12/29/2023)    Transportation Needs     Lack of Transportation: No   Recent Concern: Transportation Needs - Unmet Transportation Needs (11/9/2023)    Transportation Needs     Lack of Transportation: Yes   Housing Stability: Low Risk  (12/29/2023)    Housing Stability     Housing Instability: No     IMMUNIZATIONS  Immunization History   Administered Date(s) Administered    Covid-19 Vaccine Neelam (J&J) 0.5ml 04/06/2021, 04/14/2022    FLU VAC High Dose 65 YRS & Older PRSV Free (05998) 11/17/2023      ALLERGIES:  Allergies   Allergen Reactions    Amoxicillin HIVES     Tolerates cefazolin (8/6/21)    Erythromycin HIVES    Penicillins HIVES     Tolerates rocephin in the past, tolerates cefazolin (8/6/21)      Tetracycline HIVES    Alc-Benzyl Alc-Sulfamethoxazole-Trimethoprim RASH    Sulfa Antibiotics RASH    Tetracyclines & Related RASH    Opioid Analgesics NAUSEA ONLY     Morphine       CODE STATUS:   Full Code    ADVANCED CARE PLANNING TEAM: Will need family mtgs to arrange a safe discharge.    MEDICATIONS:  Atorvastatin 10 mg (20mg) at bedtime  Aspirin 325 mg daily  Clear eyes - 1 gtt both eyes daily prn  Cyanocobalamin 1000mcg daily  Gabapentin 100 daily  Levothyroxine 100mcg daily  Lidocaine 4% patch to neck twice daily  Metoprolol tartrate 100 mg qam  Metoprolol tartrate 50 mg q hs  Nystatin 5 ml (500,000) 4 x daily  Potassium 20 meq every other day  Risperidone 0.5 tabs (2 tablets) q hs  Secura skin care prn  Sodium chloride 0.65% nasal spray; 1 spray daily  Torsemide 40 mg twice daily  Tylenol 1000 mg q 8 hrs prn  Vit D 2,000 daily     SUBJECTIVE: denies headache, chest pain, cough, SOB.  Denies chills, fevers, n/v/d/c.  C/O mild slurred speech and aware of L facial droop.  C/O  anxiety  re: heart monitor and discharge plans.        PHYSICAL EXAM: 129/79. P 71.  RR 18. POx 98%. T 97.5. wgt  258# .  GENERAL HEALTH:well developed, well nourished, in no apparent distress   LINES, TUBES, DRAINS:  none  SKIN: warm, dry  WOUND:  sacral pressure ulcer;  See wound assessment per staff nurses   EYES: Pupils round and equal;no jaundice, conjunctiva normal; no eye drainage. HENT: mild  L facial droop; + slurred speech. mucous membranes moist.   NECK: supple; FROM  BREAST: deferred exam   RESPIRATORY: Lungs clear.  CARDIOVASCULAR:  RRR, rate 71  ABDOMEN:  normal active BS+, soft, nondistended; no guarding  :Deferred  LYMPHATIC:lymphedema  Bilateral LE  MUSCULOSKELETAL:  weakness upper and lower extremities.    EXTREMITIES/VASCULAR: bilateral LE lymphedema  NEUROLOGIC: s/p CVA, slurred speech, L facial droop. Thought process slow; speaks slowly.  PSYCHIATRIC: OR x 3. Periods of forgetfulness & confusion.     MEDICAL DECISION MAKING: pt needs assistance in making decisions; slow to respond to tasks and instructions; brother assists with complex medical decisions.      Lab Results: 1/23/24.  WBC 8.3. HGB 10.3.   Platelets 157.   Na 140.  K 3.6. Cl 103. Co2 30. Bun 41. Creat 1.0. gfr 49.    Assessment/Plan    Cardiac Monitor placed 1/22/24  30 day event monitor  Instruct patient to change daily prior to discharge  F/U [facility]    Bilateral CVA - left posterior thalamus and right corona radiata  Aspirin 325 daily  Atorvastatin 20 mg q hs  Therapy as recommended    Dysphagia/slurred speech/ L facial droop  Chopped diet  Crush meds  Speech therapy    Dyslipidemia, LDL 21  Atorvastatin 20 mg q hs  Trend labs    Hypokalemia  Potassium 20 meq increased to bid    Essential hypertension  Metoprolol tartrate 100 mg every morning  Monitor blood pressures    Hypothyroidism  Levothyroxine 100 mcg every morning before breakfast    Sacral Pressure Ulcer  Daily wound care -  1.9cm x 0.3 x0.2  Zinc and alginate.  F/U with wound MD weekly    Chronic lymphedema  Compression hose  Compression wraps  Lymphedema therapy  Trend weights     Chronic diastolic heart failure  Torsemide 40 mg bid - 8am and 4 pm  K+ supplement 20 meq bid  Metoprolol tartrate  100 mg daily  Trend labs    Chronic pain  Tramadol 5 mg q 6 hrs prn  Lidocaine 4% external patches to neck neck    Fungal rash  Nystatin 100,000 external powder prn    Weakness/fatigue  therapy    Dispo:  tentative plan: pay privately until confirmed discharge plan is arranged.  Home with caregivers & home health.  Possible AL     FOLLOW UP APPOINTMENTS   *Follow-Up with PCP within 1-2 weeks following [facility] discharge.   *Follow-Up with specialists as recommended; neuro, cardiology.    Future Appointments   Date Time Provider Department Center   2/16/2024  1:15 PM Delfina Adame DO [facility]     *Greater than 35 minutes spent w/ patient and staff, including but not limited to/ reviewing present status, needs, abilities with disciplines, reviewing medical records, vital signs, labs, completing med rec and entering orders to establish plan of care in [facility].        Note to patient:  The 21st Century Cures Act makes medical notes like these available to patients in the interest of transparency. However, this is a medical document intended as peer to peer communication. It is written in medical language and may contain abbreviations or verbiage that are unfamiliar. It may appear blunt or direct. Medical documents are intended to carry relevant information, facts as evident, and the clinical opinion of the practitioner who signs the document.    MARCELL Kraus (Daya)  01/23/24   3:15 pm  [facility] Acute Care Team

## 2024-01-29 ENCOUNTER — SNF VISIT (OUTPATIENT)
Dept: INTERNAL MEDICINE CLINIC | Age: 74
End: 2024-01-29

## 2024-01-29 VITALS
HEART RATE: 80 BPM | WEIGHT: 261.38 LBS | TEMPERATURE: 98 F | RESPIRATION RATE: 18 BRPM | SYSTOLIC BLOOD PRESSURE: 137 MMHG | BODY MASS INDEX: 46 KG/M2 | DIASTOLIC BLOOD PRESSURE: 82 MMHG | OXYGEN SATURATION: 98 %

## 2024-01-29 DIAGNOSIS — I63.9: ICD-10-CM

## 2024-01-29 DIAGNOSIS — R53.81 PHYSICAL DECONDITIONING: ICD-10-CM

## 2024-01-29 DIAGNOSIS — R26.9 GAIT ABNORMALITY: ICD-10-CM

## 2024-01-29 DIAGNOSIS — I63.81 CEREBROVASCULAR ACCIDENT (CVA) OF LEFT THALAMUS (HCC): ICD-10-CM

## 2024-01-29 DIAGNOSIS — R45.89 ANXIETY ABOUT HEALTH: ICD-10-CM

## 2024-01-29 DIAGNOSIS — L98.421 SKIN ULCER OF SACRUM, LIMITED TO BREAKDOWN OF SKIN (HCC): ICD-10-CM

## 2024-01-29 DIAGNOSIS — E66.9 LYMPHEDEMA ASSOCIATED WITH OBESITY: Primary | ICD-10-CM

## 2024-01-29 DIAGNOSIS — R63.5 WEIGHT INCREASED: ICD-10-CM

## 2024-01-29 DIAGNOSIS — I89.0 LYMPHEDEMA ASSOCIATED WITH OBESITY: Primary | ICD-10-CM

## 2024-01-29 DIAGNOSIS — R29.810: ICD-10-CM

## 2024-01-29 DIAGNOSIS — Z95.9 CARDIAC DEVICE IN SITU: ICD-10-CM

## 2024-01-29 DIAGNOSIS — I89.0 LYMPHEDEMA OF BOTH LOWER EXTREMITIES: ICD-10-CM

## 2024-01-29 RX ORDER — TORSEMIDE 20 MG/1
40 TABLET ORAL DAILY
COMMUNITY

## 2024-01-30 NOTE — PROGRESS NOTES
Angelika Hong.  50. APN Encounter 3pm.    Patient had multiple friend visitors during the day explaining and assisting her to application for medicaid.  Patient is overwhelmed and reports she is unable to find a place that is pleasing to her and her friends.     Patient complains of increased heaviness and tightness of both lower extremities.  Weight today 261.4# from 258 # on 24.  Therapy reports significant increase in edema.  Both legs wrapped by therapy manager.    Discussed with Dr. Pandya. Torsemide increased for 5 days.    Angelika Cat  : 1950. 73 year old  female is admitted to [facility] for rehabilitation and strengthening.      [facility] Admission:    23  Discharge to [place] [facility]:  23    Chief complaint today:  increased LE edema/lymphedema, anxiety, slurred speech, L facial droop,     Discharge Diagnosis:  Acute bilateral CVA - left posterior thalamus and right corona radiata, ECHO without ASD  Dyslipidemia, LDL 21  Essential hypertension  Hypothyroidism  Chronic lymphedema  Chronic diastolic heart failure     HPI:  73 yr old female w/ PMH of hypothyroidism, HTN, HL who presented to the ED for facial droop and slurred speech.  Patient  began feeling weak around 11 AM yesterday morning during her therapy.   She began having facial droop & slurred speech at 6 PM yesterday.  She is not on any blood thinners.  She denied numbness or weakness to the lower or upper extremities, dyspnea, chest pain, headaches, dizziness. Pt has chronic lower extremity lymphedema.    Pt was evaluated in the ER where she was found to have acute bilateral CVA - left posterior thalamus and right corona radiata for which she was admitted; Neurology was consulted.  Asprin and Lipitor initiated. ECHO without ASD.  PT/OT consulted, [facility] was recommended and returned to [facility] on .     Past Medical History:   Diagnosis Date    Cataract     right eye completed;  future plan for left eye procceure    Disorder of thyroid 11/9/2023    uses synthroid    Essential hypertension     High blood pressure     High cholesterol     Hx of pancreatitis     6 times    Hyperlipidemia     Lymphedema of both lower extremities     longstanding, unknown origin    Muscle weakness     Visual impairment      Past Surgical History:   Procedure Laterality Date    CATARACT      CHOLECYSTECTOMY       Family History   Problem Relation Age of Onset    Hypertension Father     Heart Disorder Paternal Grandfather     Psychiatric Sister     Lipids Brother     Obesity Sister      Social History     Socioeconomic History    Marital status: Single   Tobacco Use    Smoking status: Never    Smokeless tobacco: Never   Vaping Use    Vaping Use: Never used   Substance and Sexual Activity    Alcohol use: Not Currently    Drug use: Not Currently     Social Determinants of Health     Food Insecurity: No Food Insecurity (12/29/2023)    Food Insecurity     Food Insecurity: Never true   Transportation Needs: No Transportation Needs (12/29/2023)    Transportation Needs     Lack of Transportation: No   Recent Concern: Transportation Needs - Unmet Transportation Needs (11/9/2023)    Transportation Needs     Lack of Transportation: Yes   Housing Stability: Low Risk  (12/29/2023)    Housing Stability     Housing Instability: No     IMMUNIZATIONS  Immunization History   Administered Date(s) Administered    Covid-19 Vaccine Neelam (J&J) 0.5ml 04/06/2021, 04/14/2022    FLU VAC High Dose 65 YRS & Older PRSV Free (77660) 11/17/2023      ALLERGIES:  Allergies   Allergen Reactions    Amoxicillin HIVES     Tolerates cefazolin (8/6/21)    Erythromycin HIVES    Penicillins HIVES     Tolerates rocephin in the past, tolerates cefazolin (8/6/21)      Tetracycline HIVES    Alc-Benzyl Alc-Sulfamethoxazole-Trimethoprim RASH    Sulfa Antibiotics RASH    Tetracyclines & Related RASH    Opioid Analgesics NAUSEA ONLY     Morphine       CODE  STATUS:  Full Code    ADVANCED CARE PLANNING TEAM: Will need family mtgs to arrange a safe discharge.    MEDICATIONS:  Atorvastatin 10 mg (20mg) at bedtime  Aspirin 325 mg daily  Clear eyes - 1 gtt both eyes daily prn  Cyanocobalamin 1000mcg daily  Gabapentin 100 daily  Levothyroxine 100mcg daily  Lidocaine 4% patch to neck twice daily  Metoprolol tartrate 100 mg qam  Metoprolol tartrate 50 mg q hs  Nystatin 5 ml (500,000) 4 x daily  Potassium 20 meq every other day  Risperidone 0.5 tabs (2 tablets) q hs  Secura skin care prn  Sodium chloride 0.65% nasal spray; 1 spray daily  Torsemide 40 mg twice daily  Increased Torsemide 50 mg at noon x 5 days  Tylenol 1000 mg q 8 hrs prn  Vit D 2,000 daily     SUBJECTIVE: denies headache, chest pain, cough, SOB.  Denies chills, fevers, n/v/d/c.  Reports slurred speech and aware of L facial droop.  C/O  anxiety  discharge plans and future f/u appointments.        PHYSICAL EXAM: 137/82.  P 80. RR 18. POx 98%. T 97.5. wgt  261.4#  from 258#.  GENERAL HEALTH:well developed, well nourished, in no apparent distress   LINES, TUBES, DRAINS:  none  SKIN: warm, dry  WOUND:  sacral pressure ulcer;  See wound assessment per staff nurses   EYES: Pupils round and equal;no jaundice, conjunctiva normal; no eye drainage. HENT: mild  L facial droop; + slurred speech. mucous membranes moist.   NECK: supple; FROM  BREAST: deferred exam   RESPIRATORY: Lungs clear.  CARDIOVASCULAR:  RRR, rate 80. Cardiac event monitor in place.  ABDOMEN:  normal active BS+, soft, nondistended; no guarding  :Deferred  LYMPHATIC:lymphedema  increased bilateral LE with heaviness  MUSCULOSKELETAL:  increased weakness lower extremities.    EXTREMITIES:increased bilateral LE lymphedema  NEUROLOGIC: s/p CVA, slurred speech, L facial droop. Thought process slow; speaks slowly.  PSYCHIATRIC: OR x 3. Periods of forgetfulness & confusion.     MEDICAL DECISION MAKING: pt needs assistance in making decisions; slow to respond to  tasks and instructions; brother assists with complex medical decisions.      Lab Results: 1/23/24.  WBC 8.3. HGB 10.3.  Platelets 157.   Na 140.  K 3.6. Cl 103. Co2 30. Bun 41. Creat 1.0. gfr 49.    Assessment/Plan    Cardiac Monitor placed 1/22/24  30 day event monitor  Instruct patient to change daily prior to discharge  F/U [facility]    Bilateral CVA - left posterior thalamus and right corona radiata  Aspirin 325 daily  Atorvastatin 20 mg q hs  Therapy as recommended    Chronic lymphedema - increased edema  Compression hose  Compression wraps  Lymphedema therapy  Add Torsemide 40 mg every noon x 5 days  Trend weights     Dysphagia/slurred speech/ L facial droop  Chopped diet  Crush meds  Speech therapy    Dyslipidemia, LDL 21  Atorvastatin 20 mg q hs  Trend labs    Hypokalemia  Potassium 20 meq increased to bid    Essential hypertension  Metoprolol tartrate 100 mg every morning  Monitor blood pressures    Hypothyroidism  Levothyroxine 100 mcg every morning before breakfast    Sacral Pressure Ulcer  Daily wound care -  1.9cm x 0.3 x0.2  Zinc and alginate.  F/U with wound MD weekly    Chronic diastolic heart failure  Torsemide 40 mg bid - 8am and 4 pm  Additional Torsemide 40 mg at noon  K+ supplement 20 meq bid  Metoprolol tartrate  100 mg daily  Trend labs    Chronic pain  Tramadol 5 mg q 6 hrs prn  Lidocaine 4% external patches to neck neck    Fungal rash  Nystatin 100,000 external powder prn    Weakness/fatigue  therapy    Dispo:  tentative plan: pay privately until confirmed discharge plan is arranged.  Exploring Assisted Living     FOLLOW UP APPOINTMENTS   *Follow-Up with PCP within 1-2 weeks following [facility] discharge.   *Follow-Up with specialists as recommended; neuro, cardiology.  *Follow-Up with PCP 2/5/24  Future Appointments   Date Time Provider Department Center   2/16/2024  1:15 PM Delfina Adame DO [facility]     *Greater than 35 minutes spent w/ patient and staff, including but not  limited to/ reviewing present status, needs, abilities with disciplines, reviewing medical records, vital signs, labs, completing med rec and entering orders to establish plan of care in [facility].        Note to patient: The 21st Century Cures Act makes medical notes like these available to patients in the interest of transparency. However, this is a medical document intended as peer to peer communication. It is written in medical language and may contain abbreviations or verbiage that are unfamiliar. It may appear blunt or direct. Medical documents are intended to carry relevant information, facts as evident, and the clinical opinion of the practitioner who signs the document.    Ellen Cleaning) Blayne, MARCELL  01/29/24   3pm  [facility] Team

## 2024-02-01 ENCOUNTER — SNF VISIT (OUTPATIENT)
Dept: INTERNAL MEDICINE CLINIC | Age: 74
End: 2024-02-01

## 2024-02-01 DIAGNOSIS — R47.02 DYSPHASIA: ICD-10-CM

## 2024-02-01 DIAGNOSIS — Z92.89 HISTORY OF CARDIAC MONITORING: Primary | ICD-10-CM

## 2024-02-01 DIAGNOSIS — L98.421 SKIN ULCER OF SACRUM, LIMITED TO BREAKDOWN OF SKIN (HCC): ICD-10-CM

## 2024-02-01 DIAGNOSIS — R45.89 ANXIETY ABOUT HEALTH: ICD-10-CM

## 2024-02-01 DIAGNOSIS — R26.9 GAIT ABNORMALITY: ICD-10-CM

## 2024-02-01 DIAGNOSIS — I89.0 LYMPHEDEMA OF BOTH LOWER EXTREMITIES: ICD-10-CM

## 2024-02-01 DIAGNOSIS — I63.81 CEREBROVASCULAR ACCIDENT (CVA) OF LEFT THALAMUS (HCC): ICD-10-CM

## 2024-02-01 DIAGNOSIS — I63.9: ICD-10-CM

## 2024-02-01 DIAGNOSIS — R63.5 WEIGHT INCREASED: ICD-10-CM

## 2024-02-01 DIAGNOSIS — R53.81 PHYSICAL DECONDITIONING: ICD-10-CM

## 2024-02-01 DIAGNOSIS — R29.810: ICD-10-CM

## 2024-02-01 PROCEDURE — 99309 SBSQ NF CARE MODERATE MDM 30: CPT | Performed by: NURSE PRACTITIONER

## 2024-02-04 VITALS
BODY MASS INDEX: 48 KG/M2 | TEMPERATURE: 97 F | WEIGHT: 268.63 LBS | DIASTOLIC BLOOD PRESSURE: 69 MMHG | HEART RATE: 92 BPM | SYSTOLIC BLOOD PRESSURE: 106 MMHG | RESPIRATION RATE: 16 BRPM | OXYGEN SATURATION: 99 %

## 2024-02-05 NOTE — PROGRESS NOTES
Angelika Hong.  50. APN Encounter 24 330pm    Met with patient at bedside.  Angelika is forgetful about the facilities our  is working with to obtain acceptance.  Patient is aware that she has friends, her brother and staff that are helping her.  L facial droop has improved; slurred speech improving.    Pt c/o her legs feeling heavy and doesn't think she is urinating enough?  Staff report increased urination, briefs wet and patient refuses to ambulate to the toilet. Day is spent in her w/c until bed time.  Meals are all in her room.  Wgt today 268.6# from 261.4#, and 258 # on 24.  Therapy manager changes her lymphedema wraps. .    Discussed with Dr. Pandya. Additional Torsemide increased until 2/3/24.    Angelika Cat  : 1950. 73 year old  female [place] [facility] [place].      [facility] Admission:    23  Discharge to [facility]:  23    Chief complaint today:  LE edema/lymphedema, slurred speech, L facial droop     Discharge Diagnosis:  Acute bilateral CVA - left posterior thalamus and right corona radiata, ECHO without ASD  Dyslipidemia, LDL 21  Essential hypertension  Hypothyroidism  Chronic lymphedema  Chronic diastolic heart failure     HPI:  73 yr old female w/ PMH of hypothyroidism, HTN, HL who presented to the ED for facial droop and slurred speech.  Patient  began feeling weak around 11 AM yesterday morning during her therapy.   She began having facial droop & slurred speech at 6 PM yesterday.  She is not on any blood thinners.  She denied numbness or weakness to the lower or upper extremities, dyspnea, chest pain, headaches, dizziness. Pt has chronic lower extremity lymphedema.    Pt was evaluated in the ER where she was found to have acute bilateral CVA - left posterior thalamus and right corona radiata for which she was admitted; Neurology was consulted.  Asprin and Lipitor initiated. ECHO  without ASD.  PT/OT consulted, BRANDYN was recommended and returned to [place] on 12/31/2.     Past Medical History:   Diagnosis Date    Cataract     right eye completed; future plan for left eye procceure    Disorder of thyroid 11/9/2023    uses synthroid    Essential hypertension     High blood pressure     High cholesterol     Hx of pancreatitis     6 times    Hyperlipidemia     Lymphedema of both lower extremities     longstanding, unknown origin    Muscle weakness     Visual impairment      Past Surgical History:   Procedure Laterality Date    CATARACT      CHOLECYSTECTOMY       Family History   Problem Relation Age of Onset    Hypertension Father     Heart Disorder Paternal Grandfather     Psychiatric Sister     Lipids Brother     Obesity Sister      Social History     Socioeconomic History    Marital status: Single   Tobacco Use    Smoking status: Never    Smokeless tobacco: Never   Vaping Use    Vaping Use: Never used   Substance and Sexual Activity    Alcohol use: Not Currently    Drug use: Not Currently     Social Determinants of Health     Food Insecurity: No Food Insecurity (12/29/2023)    Food Insecurity     Food Insecurity: Never true   Transportation Needs: No Transportation Needs (12/29/2023)    Transportation Needs     Lack of Transportation: No   Recent Concern: Transportation Needs - Unmet Transportation Needs (11/9/2023)    Transportation Needs     Lack of Transportation: Yes   Housing Stability: Low Risk  (12/29/2023)    Housing Stability     Housing Instability: No     IMMUNIZATIONS  Immunization History   Administered Date(s) Administered    Covid-19 Vaccine [employer] (J&J) 0.5ml 04/06/2021, 04/14/2022    FLU VAC High Dose 65 YRS & Older PRSV Free (51909) 11/17/2023      ALLERGIES:  Allergies   Allergen Reactions    Amoxicillin HIVES     Tolerates cefazolin (8/6/21)    Erythromycin HIVES    Penicillins HIVES     Tolerates rocephin in the past, tolerates cefazolin (8/6/21)      Tetracycline HIVES     Alc-Benzyl Alc-Sulfamethoxazole-Trimethoprim RASH    Sulfa Antibiotics RASH    Tetracyclines & Related RASH    Opioid Analgesics NAUSEA ONLY     Morphine       CODE STATUS:  Full Code    ADVANCED CARE PLANNING TEAM: Will need family mtgs to arrange a safe discharge.    MEDICATIONS:  Atorvastatin 10 mg (20mg) at bedtime  Aspirin 325 mg daily  Clear eyes - 1 gtt both eyes daily prn  Cyanocobalamin 1000mcg daily  Gabapentin 100 daily  Levothyroxine 100mcg daily  Lidocaine 4% patch to neck twice daily  Metoprolol tartrate 100 mg qam  Metoprolol tartrate 50 mg q hs  Nystatin 5 ml (500,000) 4 x daily  Potassium 20 meq every other day  Risperidone 0.5 tabs (2 tablets) q hs  Secura skin care prn  Sodium chloride 0.65% nasal spray; 1 spray daily  Torsemide 40 mg twice daily  Increased Torsemide 450 mg at noon  until 2/3/24  Tylenol 1000 mg q 8 hrs prn  Vit D 2,000 daily     SUBJECTIVE: denies headache, chest pain, cough, SOB.  Denies chills, fevers, n/v/d/c.  Reports slurred speech improving.   Anxious re: discharge.  appointments.        PHYSICAL EXAM:  106/69. P 92. RR 16. POx 99%. T 96.6. wgt  268.7# from 261.4#  from 258#.  GENERAL HEALTH:well developed, well nourished, in no apparent distress   LINES, TUBES, DRAINS:  none  SKIN: warm, dry  WOUND:  sacral pressure ulcer;  See wound assessment per staff nurses.   EYES: Pupils round and equal;no jaundice, conjunctiva normal; no eye drainage. HENT: mild L facial droop; + slurred speech improving.    NECK: supple  BREAST: deferred exam   RESPIRATORY: Lungs clear.  CARDIOVASCULAR:  RRR, rate 92. Cardiac event monitor in place.  ABDOMEN:  normal active BS+, soft, nondistended; no guarding  :Deferred  LYMPHATIC:lymphedema  increased bilateral LE   MUSCULOSKELETAL:  increased weakness lower extremities.    EXTREMITIES:increased bilateral LE lymphedema  NEUROLOGIC: s/p CVA  Thought process slow; forgetful.   PSYCHIATRIC: OR x 3. Periods of forgetfulness & confusion.      MEDICAL DECISION MAKING: pt needs assistance in making decisions; slow to respond to tasks and instructions; brother assists with complex medical decisions.      Lab Results: 1/30/24.  WBC7.5. HGB 10.9.  Platelets 139.   Na 142.  K 4.1. Cl 104. Co2 31. Bun 32. Creat 1.07.  gfr 50. Alb 2.9.    Assessment/Plan    Cardiac Monitor placed 1/22/24  30 day event monitor  Instruct patient to change daily prior to discharging  F/U [facility]    Bilateral CVA - left posterior thalamus and right corona radiata  Aspirin 325 daily  Atorvastatin 20 mg q hs  Atorvastatin 20 q hs  Therapy as recommended    Chronic lymphedema - increased edema  Compression hose  Compression wraps  Lymphedema therapy  Add Torsemide  at noon thru 2/3  Trend weights     Dysphagia/slurred speech/ L facial droop  Chopped diet  Crush meds  Speech therapy    Dyslipidemia, LDL 21  Atorvastatin 20 mg q hs    Hypokalemia  Potassium 20 meq increased to bid    Essential hypertension  Metoprolol tartrate 100 mg every morning. 50 mg q hs    Hypothyroidism  Levothyroxine 100 mcg every morning before breakfast    Sacral Pressure Ulcer  Daily wound care -  1.3cm x 0.3 x0.2  Zinc and alginate.  F/U w/ wound MD weekly    Chronic diastolic heart failure  Torsemide 40 mg bid - 8am and 4 pm  Additional Torsemide at noon thru 2/3/24  K+ supplement 20 meq bid  Metoprolol tartrate  100 mg daily/50 q hs    Chronic pain  Tramadol 5 mg q 6 hrs prn  Lidocaine 4% external patches to neck neck  Biofreeze to shoulders q 8 hrs    Fungal rash  Nystatin 100,000 external powder prn    Dispo:  tentative plan: pay privately until confirmed discharge plan is arranged.  Exploring Assisted Living     FOLLOW UP APPOINTMENTS   *Follow-Up with PCP within 1-2 weeks following BRANDYN discharge.   *Follow-Up with specialists as recommended; neuro, cardiology.  *Follow-Up with PCP 2/5/24  Future Appointments   Date Time Provider Department Center   2/16/2024  1:15 PM Delfina Adame DO  [facility]     *Greater than 35 minutes spent w/ patient and staff, including but not limited to/ reviewing present status, needs, abilities with disciplines, reviewing medical records, vital signs, labs, completing med rec and entering orders to establish plan of care in [facility].        Note to patient: The 21st Century Cures Act makes medical notes like these available to patients in the interest of transparency. However, this is a medical document intended as peer to peer communication. It is written in medical language and may contain abbreviations or verbiage that are unfamiliar. It may appear blunt or direct. Medical documents are intended to carry relevant information, facts as evident, and the clinical opinion of the practitioner who signs the document.    Ellen (Marcie) MARCELL Cisneros  2/1/24  330pm  [facility] Post Acute Care Team

## 2024-02-08 ENCOUNTER — SNF VISIT (OUTPATIENT)
Dept: INTERNAL MEDICINE CLINIC | Age: 74
End: 2024-02-08

## 2024-02-08 DIAGNOSIS — R26.9 GAIT ABNORMALITY: ICD-10-CM

## 2024-02-08 DIAGNOSIS — I89.0 LYMPHEDEMA ASSOCIATED WITH OBESITY: ICD-10-CM

## 2024-02-08 DIAGNOSIS — Z92.89 HISTORY OF CARDIAC MONITORING: Primary | ICD-10-CM

## 2024-02-08 DIAGNOSIS — R47.02 DYSPHASIA: ICD-10-CM

## 2024-02-08 DIAGNOSIS — R53.81 PHYSICAL DECONDITIONING: ICD-10-CM

## 2024-02-08 DIAGNOSIS — E66.9 LYMPHEDEMA ASSOCIATED WITH OBESITY: ICD-10-CM

## 2024-02-08 DIAGNOSIS — I89.0 LYMPHEDEMA OF BOTH LOWER EXTREMITIES: ICD-10-CM

## 2024-02-08 DIAGNOSIS — R45.89 ANXIETY ABOUT HEALTH: ICD-10-CM

## 2024-02-08 DIAGNOSIS — I63.81 CEREBROVASCULAR ACCIDENT (CVA) OF LEFT THALAMUS (HCC): ICD-10-CM

## 2024-02-15 ENCOUNTER — SNF VISIT (OUTPATIENT)
Dept: INTERNAL MEDICINE CLINIC | Age: 74
End: 2024-02-15

## 2024-02-15 DIAGNOSIS — R26.9 GAIT ABNORMALITY: ICD-10-CM

## 2024-02-15 DIAGNOSIS — R53.81 PHYSICAL DECONDITIONING: ICD-10-CM

## 2024-02-15 DIAGNOSIS — R47.02 DYSPHASIA: ICD-10-CM

## 2024-02-15 DIAGNOSIS — L98.421 SKIN ULCER OF SACRUM, LIMITED TO BREAKDOWN OF SKIN (HCC): ICD-10-CM

## 2024-02-15 DIAGNOSIS — R45.89 ANXIETY ABOUT HEALTH: ICD-10-CM

## 2024-02-15 DIAGNOSIS — I89.0 LYMPHEDEMA ASSOCIATED WITH OBESITY: ICD-10-CM

## 2024-02-15 DIAGNOSIS — E66.9 LYMPHEDEMA ASSOCIATED WITH OBESITY: ICD-10-CM

## 2024-02-15 DIAGNOSIS — Z92.89 HISTORY OF CARDIAC MONITORING: Primary | ICD-10-CM

## 2024-02-15 DIAGNOSIS — I63.81 CEREBROVASCULAR ACCIDENT (CVA) OF LEFT THALAMUS (HCC): ICD-10-CM

## 2024-02-19 VITALS
DIASTOLIC BLOOD PRESSURE: 61 MMHG | TEMPERATURE: 97 F | SYSTOLIC BLOOD PRESSURE: 115 MMHG | HEART RATE: 71 BPM | OXYGEN SATURATION: 98 %

## 2024-02-19 VITALS
SYSTOLIC BLOOD PRESSURE: 115 MMHG | HEART RATE: 64 BPM | DIASTOLIC BLOOD PRESSURE: 63 MMHG | WEIGHT: 168 LBS | TEMPERATURE: 98 F | BODY MASS INDEX: 30 KG/M2 | OXYGEN SATURATION: 98 %

## 2024-02-19 RX ORDER — MENTHOL 100 MG/G
CREAM TOPICAL
COMMUNITY

## 2024-02-20 ENCOUNTER — SNF DISCHARGE (OUTPATIENT)
Dept: INTERNAL MEDICINE CLINIC | Age: 74
End: 2024-02-20

## 2024-02-20 VITALS
BODY MASS INDEX: 48 KG/M2 | HEART RATE: 79 BPM | DIASTOLIC BLOOD PRESSURE: 87 MMHG | SYSTOLIC BLOOD PRESSURE: 129 MMHG | OXYGEN SATURATION: 97 % | WEIGHT: 273 LBS | RESPIRATION RATE: 18 BRPM | TEMPERATURE: 98 F

## 2024-02-20 DIAGNOSIS — R13.11 ORAL PHASE DYSPHAGIA: ICD-10-CM

## 2024-02-20 DIAGNOSIS — I89.0 LYMPHEDEMA ASSOCIATED WITH OBESITY: ICD-10-CM

## 2024-02-20 DIAGNOSIS — R63.5 WEIGHT INCREASED: ICD-10-CM

## 2024-02-20 DIAGNOSIS — I63.81 CEREBROVASCULAR ACCIDENT (CVA) OF LEFT THALAMUS (HCC): ICD-10-CM

## 2024-02-20 DIAGNOSIS — R45.89 ANXIETY ABOUT HEALTH: ICD-10-CM

## 2024-02-20 DIAGNOSIS — R53.81 PHYSICAL DECONDITIONING: ICD-10-CM

## 2024-02-20 DIAGNOSIS — E66.9 LYMPHEDEMA ASSOCIATED WITH OBESITY: ICD-10-CM

## 2024-02-20 DIAGNOSIS — I89.0 LYMPHEDEMA OF BOTH LOWER EXTREMITIES: ICD-10-CM

## 2024-02-20 DIAGNOSIS — R26.9 GAIT ABNORMALITY: ICD-10-CM

## 2024-02-20 DIAGNOSIS — L98.421 SKIN ULCER OF SACRUM, LIMITED TO BREAKDOWN OF SKIN (HCC): ICD-10-CM

## 2024-02-20 DIAGNOSIS — I10 ESSENTIAL HYPERTENSION: ICD-10-CM

## 2024-02-20 DIAGNOSIS — Z92.89 HISTORY OF CARDIAC MONITORING: Primary | ICD-10-CM

## 2024-02-20 DIAGNOSIS — M62.81 MUSCLE WEAKNESS: ICD-10-CM

## 2024-02-20 DIAGNOSIS — I63.9 FACIAL DROOP DUE TO ACUTE STROKE (HCC): ICD-10-CM

## 2024-02-20 DIAGNOSIS — R29.810 FACIAL DROOP DUE TO ACUTE STROKE (HCC): ICD-10-CM

## 2024-02-20 PROCEDURE — 99316 NF DSCHRG MGMT 30 MIN+: CPT | Performed by: NURSE PRACTITIONER

## 2024-02-20 NOTE — PROGRESS NOTES
Gely Angelika.  50. APN Encounter 24  6pm    Met with patient at bedside after dinner.  Angelika typically likes to meet with me in the evening.  She stays in her room most of the day.  Eats all 3 meals in her room.  Therapy manager sees her 3 x per week to wrap her legs d/t chronic lymphedema.  Patient states she has not yet made a decision on where she will be transferring to. Continues to work with  to select a place in the [facility].    Patient has a coccyx pressure ulcer which is likely d/t friction sitting in her wheelchair all day; refuses to ambulate to the toilet.  Wound doctor sees patient weekly.  Measurements today 1.0 cm x 0.3 cmx 0.2cm. local daily wound care.      Angelika Vyas Dionicio  : 1950. 73 year old  female is admitted to [facility] for rehabilitation and strengthening.      [facility] Admission:    23  Discharge to [facility]:  23    Chief complaint today:  LE edema/lymphedema, mild left facial droop     Discharge Diagnosis:  Acute bilateral CVA - left posterior thalamus and right corona radiata, ECHO without ASD  Dyslipidemia, LDL 21  Essential hypertension  Hypothyroidism  Chronic lymphedema  Chronic diastolic heart failure     HPI:  73 yr old female w/ PMH of hypothyroidism, HTN, HL who presented to the ED for facial droop and slurred speech.  Patient  began feeling weak around 11 AM yesterday morning during her therapy.   She began having facial droop & slurred speech at 6 PM yesterday.  She is not on any blood thinners.  She denied numbness or weakness to the lower or upper extremities, dyspnea, chest pain, headaches, dizziness. Pt has chronic lower extremity lymphedema.    Pt was evaluated in the ER where she was found to have acute bilateral CVA - left posterior thalamus and right corona radiata for which she was admitted; Neurology was consulted.  Asprin and Lipitor initiated. ECHO without ASD.  PT/OT consulted,  BRANDYN was recommended and returned to [place] on 12/31/2.     Past Medical History:   Diagnosis Date    Cataract     right eye completed; future plan for left eye procceure    Disorder of thyroid 11/9/2023    uses synthroid    Essential hypertension     High blood pressure     High cholesterol     Hx of pancreatitis     6 times    Hyperlipidemia     Lymphedema of both lower extremities     longstanding, unknown origin    Muscle weakness     Visual impairment      Past Surgical History:   Procedure Laterality Date    CATARACT      CHOLECYSTECTOMY       Family History   Problem Relation Age of Onset    Hypertension Father     Heart Disorder Paternal Grandfather     Psychiatric Sister     Lipids Brother     Obesity Sister      Social History     Socioeconomic History    Marital status: Single   Tobacco Use    Smoking status: Never    Smokeless tobacco: Never   Vaping Use    Vaping Use: Never used   Substance and Sexual Activity    Alcohol use: Not Currently    Drug use: Not Currently     Social Determinants of Health     Food Insecurity: No Food Insecurity (12/29/2023)    Food Insecurity     Food Insecurity: Never true   Transportation Needs: No Transportation Needs (12/29/2023)    Transportation Needs     Lack of Transportation: No   Recent Concern: Transportation Needs - Unmet Transportation Needs (11/9/2023)    Transportation Needs     Lack of Transportation: Yes   Housing Stability: Low Risk  (12/29/2023)    Housing Stability     Housing Instability: No     IMMUNIZATIONS  Immunization History   Administered Date(s) Administered    Covid-19 Vaccine [employer] (J&J) 0.5ml 04/06/2021, 04/14/2022    FLU VAC High Dose 65 YRS & Older PRSV Free (93667) 11/17/2023      ALLERGIES:  Allergies   Allergen Reactions    Amoxicillin HIVES     Tolerates cefazolin (8/6/21)    Erythromycin HIVES    Penicillins HIVES     Tolerates rocephin in the past, tolerates cefazolin (8/6/21)      Tetracycline HIVES    Alc-Benzyl  Alc-Sulfamethoxazole-Trimethoprim RASH    Sulfa Antibiotics RASH    Tetracyclines & Related RASH    Opioid Analgesics NAUSEA ONLY     Morphine       CODE STATUS:  Full Code    ADVANCED CARE PLANNING TEAM: Will need family mtgs to arrange a safe discharge. Exploring SNF in [facility].    MEDICATIONS:  Atorvastatin 10 mg (20mg) at bedtime  Aspirin 325 mg daily  Clear eyes - 1 gtt both eyes daily prn  Cyanocobalamin 1000mcg daily  Gabapentin 100 daily  Levothyroxine 100mcg daily  Lidocaine 4% patch to neck twice daily  Metoprolol tartrate 100 mg qam  Metoprolol tartrate 50 mg q hs  Nystatin 5 ml (500,000) 4 x daily  Potassium 20 meq every other day  Risperidone 0.5 tabs (2 tablets) q hs  Secura skin care prn  Sodium chloride 0.65% nasal spray; 1 spray daily  Torsemide 40 mg twice daily  Tylenol 1000 mg q 8 hrs prn  Vit D 2,000 daily  Biofreeze to upper extremities and shoulders q 8 hrs     SUBJECTIVE: denies headache, chest pain, cough, SOB.  Denies chills, fevers, n/v/d/c.  Reports slurred speech much improved.  Anxious re: discharge.          PHYSICAL EXAM:  115/63. P 64. POx 98%. T 98.1. wgt  268 from 261.4#,258#.  GENERAL HEALTH:well developed, well nourished, in no apparent distress   LINES, TUBES, DRAINS:  none  SKIN: warm, dry  WOUND:  sacral pressure ulcer;  See wound assessment above and  per staff nurs  HENT: mild L facial droop improved.  + slurred speech improved.    NECK: supple  BREAST: deferred exam   RESPIRATORY: Lungs clear.  CARDIOVASCULAR:  RRR, rate 64. Cardiac event monitor removed.   ABDOMEN:  normal active BS+, soft, nondistended; no guarding  :Deferred  LYMPHATIC:lymphedema   bilateral LE edema    MUSCULOSKELETAL:  increased weakness lower extremities.    EXTREMITIES:increased bilateral LE lymphedema  NEUROLOGIC: s/p CVA  Thought process slow; forgetful.   PSYCHIATRIC: OR x 3. Periods of forgetfulness & confusion.     MEDICAL DECISION MAKING: pt needs assistance in making decisions; slow  to respond to tasks and instructions; brother assists with complex medical decisions.      Lab Results: 1/30/24.  WBC7.5. HGB 10.9.  Platelets 139.   Na 142.  K 4.1. Cl 104. Co2 31. Bun 32. Creat 1.07.  gfr 50. Alb 2.9.    Assessment/Plan    Cardiac Monitor placed 1/22/24 and removed  30 day event monitor ended  F/U [facility] 3/5/24 1 pm    Bilateral CVA - left posterior thalamus and right corona radiata  Aspirin 325 daily  Atorvastatin 20 mg q hs  Atorvastatin 20 q hs    Chronic lymphedema - increased edema  Compression hose  Compression wraps  Lymphedema therapy  Torsemide  40 mg twice daily   Trend weights     Dysphagia/slurred speech/ L facial droop improved  Chopped diet  Crush meds    Dyslipidemia, LDL 21  -  Atorvastatin 20 mg q hs    Hypokalemia  -  Potassium 20 meq bid    Essential hypertension   Metoprolol tartrate 100 mg every morning. 50 mg q hs    Hypothyroidism - Levothyroxine 100 mcg qam before breakfast    Sacral Pressure Ulcer  Daily wound care -  1.0 x 0.3. x 0.2 cm  Zinc and alginate.  F/U w/ wound MD weekly    Chronic diastolic heart failure  Torsemide 40 mg bid - 8am and 4 pm  K+ supplement 20 meq bid  Metoprolol tartrate  100 mg daily/50 q hs    Chronic pain/ osteoarthritis  Tramadol 5 mg q 6 hrs prn  Lidocaine 4% external patches to neck neck  Biofreeze to shoulders and arms q 8 hrs    Fungal rash  Nystatin 100,000 external powder prn    Dispo:  tentative plan: pay privately until confirmed discharge plan is arranged.  Exploring SNF's.      FOLLOW UP APPOINTMENTS   *Follow-Up with PCP within 1-2 weeks following BRANDYN discharge.   *Follow-Up with specialists as recommended; neuro, cardiology.  *Dr. Garcia Neuro, 2/27 at 1 pm  *cardiologist 3/5/24 at 1 pm    *Greater than 35 minutes spent w/ patient and staff, including but not limited to/ reviewing present status, needs, abilities with disciplines, reviewing medical records, vital signs, labs, completing med rec and entering orders to  establish plan of care in [facility].        Note to patient: The 21st Century Cures Act makes medical notes like these available to patients in the interest of transparency. However, this is a medical document intended as peer to peer communication. It is written in medical language and may contain abbreviations or verbiage that are unfamiliar. It may appear blunt or direct. Medical documents are intended to carry relevant information, facts as evident, and the clinical opinion of the practitioner who signs the document.    MARCELL Kraus (Daya)  2/8/24   6 pm  [facility] Post Acute Care Team

## 2024-02-20 NOTE — PROGRESS NOTES
GelyAngelika RICHARD 50.   APN discharge summary 24 12pm    Met with patient, and friend at bedside.  She reports being packed up and ready to discharge to [facility] [place], [facility].  She is anxious but relieved she has an AL near her brother.  Patient's brother will follow her to [facility] in [place].     Angelika Vyas Sakinagabrielle, 1950, 73 year old, female is being discharged from [facility]    DISCHARGE SUMMARY    Date of Admission to [facility]:      23    Date of Discharge from [place] [facility]:  24                                 Admitting Diagnoses: Metabolic Encephalopathy, UTI, Lymphedema, CVA, cellulitis and pressure ulcer.    Reason for Admission to [facility]: Lymphedema Therapy, Rehabilitation and Strengthening.  New CVA during rehab- sent to [facility] - 23..      PHYSICAL EXAM: 129/87. P 79. RR 18, POx 97%, T 97.5. wgt 273.4lb  GENERAL HEALTH:well developed, well nourished, in no apparent distress   LINES, TUBES, DRAINS:  none  SKIN: warm, dry  WOUND: see wound assessment,   EYES: pupils round and equal; no jaundice, conjunctiva normal; no drainage from eyes  HENT: no nasal drainage, mucous membranes pink, moist,  mild L facial droop and slow speech much improved since CVA   NECK: supple; FROM  BREAST: deferred exam   RESPIRATORY: lungs clear  CARDIOVASCULAR: RRR, rate 79  ABDOMEN:  normal active BS+, soft, obese, no guarding.  :Deferred  LYMPHATIC:lymphedema  bilateral  MUSCULOSKELETAL: back pain, shoulder pain, neck pain.  Confined in wheelchair  EXTREMITIES/VASCULAR:  + edema w/bilateral lymphedema.  Does not ambulate.  NEUROLOGIC:follows commands declines wheelchair exercises unless therapy assists.  PSYCHIATRIC: anxious  OR X 3.    MEDICATIONS:  Atorvastatin 10 mg (20mg) at bedtime  Aspirin 325 mg daily  Clear eyes - 1 gtt both eyes daily prn  Cyanocobalamin 1000mcg daily  Gabapentin 100 daily  Levothyroxine 100mcg daily  Lidocaine 4% patch to neck twice  daily  Metoprolol tartrate 100 mg qam  Metoprolol tartrate 50 mg q hs  Nystatin 5 ml (500,000) 4 x daily  Potassium 20 meq every other day  Risperidone 0.5 tabs (2 tablets) q hs  Secura skin care prn  Sodium chloride 0.65% nasal spray; 1 spray daily  Torsemide 40 mg twice daily  Tylenol 1000 mg q 8 hrs prn  Vit D 2,000 daily  Biofreeze to upper extremities and shoulders q 8 hrs     SUBJECTIVE: denies headache, chest pain, cough, SOB.  Denies chills, fevers, n/v/d/c.  Reports slurred speech much improved.  Anxious re: discharge.          MEDICAL DECISION MAKING: pt needs assistance in making decisions; brother assists with complex medical decisions.      Lab Results: 1/30/24.  WBC7.5. HGB 10.9.  Platelets 139.   Na 142.  K 4.1. Cl 104. Co2 31. Bun 32. Creat 1.07.  gfr 50. Alb 2.9.    Assessment/Plan    Cardiac Monitor placed 1/22/24 and removed  30 day event monitor ended  F/U [facility] 3/5/24 at 1 pm    Bilateral CVA - left posterior thalamus and right corona radiata  Aspirin 325 daily  Atorvastatin 20 mg q hs    Chronic lymphedema - increased edema  Compression hose  Compression wraps  Lymphedema therapy  Torsemide  40 mg twice daily   Trend weights     Dysphagia/slurred speech/ L facial droop improved  S/P CVA  Chopped diet, Crush meds  Monitor s/s of CVA    Dyslipidemia, LDL 21  -  Atorvastatin 20 mg q hs    Hypokalemia  -  Potassium 20 meq bid    Essential hypertension   Metoprolol tartrate 100 mg every morning. 50 mg q hs    Hypothyroidism - Levothyroxine 100 mcg qam before breakfast    Sacral Pressure Ulcer  Daily wound care -  0.8 cm x 0.3 cm x 0.2 cm - stage 2.   Zinc and alginate.  Sees wound MD gorman    Chronic diastolic heart failure  Torsemide 40 mg bid - 8am and 4 pm  K+ supplement 20 meq bid  Metoprolol tartrate  100 mg daily/50 q hs    Chronic pain/ osteoarthritis  Tramadol 5 mg q 6 hrs prn  Lidocaine 4% external patches to neck neck  Biofreeze to shoulders and arms q 8 hrs    Fungal rash -  Nystatin 100,000 external powder prn    Dispo: Discharged today at 1 pm to [facility] in [facility].       FOLLOW UP APPOINTMENTS   *Follow-Up with PCP within 1-2 weeks following BRANDYN discharge.   *Follow-Up with specialists as recommended; neuro, cardiology.  *Dr. Segura Neuro, 2/27 at 1 pm  *cardiologist 3/5/24 at 1 pm    FOLLOW UP APPOINTMENTS  Future Appointments   Date Time Provider Department Center   2/27/2024  1:00 PM David Segura MD [facility]      Greater than 30 minutes spent today with patient and staff, including but not limited to/ coordination of care in preparation for discharge; reviewing present status, progress with therapy, discharge needs--DME and home health, reviewing medical records, labs, completing medication reconciliation, providing prescriptions and guiding referrals to PCP/specialists for continued medical care and oversight of ongoing needs.  Note to patient: The 21st Century Cures Act makes medical notes like these available to patients in the interest of transparency. However, this is a medical document intended as peer to peer communication. It is written in medical language and may contain abbreviations or verbiage that are unfamiliar. It may appear blunt or direct. Medical documents are intended to carry relevant information, facts as evident and the clinical opinion of the practitioner who signs the document.    MARCELL Grace  2/20/2024 12 pm  [facility] Post Acute Care Team

## 2024-02-20 NOTE — PROGRESS NOTES
Angelika Hong.  23. APN encounter 2/15/24.  6:30 pm.    Met with patient at bedside.  Patient reports she has made a decision to select [facility] in [place], [place].  She reports it is a 3 star rating out of 5.   She is anxious about  the move but happy that she has a plan in place.  Angelika inquired about her future MD appointments.  Informed her that if she is discharged, she will have to obtain a ride from [place] to [place].   F/U Appointment w/ Dr. Garcia  on , neurologist.   Cardiologist on 3/5/ at 1 pm.    Patient continues to stay in her room most of the day; eating all 3 meals in her room.  Therapy manager sees patient 3 x per week for lymphedema therapy and wraps.     Patient has a coccyx pressure ulcer which is likely d/t friction sitting in her wheelchair all day; refuses to ambulate to the toilet.  Wound doctor sees patient weekly.  Measurements today  0.8 cm x 0.3 cm x 0.2 cm; local daily wound care.      Angelika Cat  : 1950. 73 year old  female is admitted to [facility] for [facility] and strengthening.      [facility] Admission:    23  Discharge to [facility]:  23    Chief complaint today:  LE edema/lymphedema, mild left facial droop     Discharge Diagnosis:  Acute bilateral CVA - left posterior thalamus and right corona radiata, ECHO without ASD  Dyslipidemia, LDL 21  Essential hypertension  Hypothyroidism  Chronic lymphedema  Chronic diastolic heart failure     HPI:  73 yr old female w/ PMH of hypothyroidism, HTN, HL who presented to the ED for facial droop and slurred speech.  Patient  began feeling weak around 11 AM yesterday morning during her therapy.   She began having facial droop & slurred speech at 6 PM yesterday.  She is not on any blood thinners.  She denied numbness or weakness to the lower or upper extremities, dyspnea, chest pain, headaches, dizziness. Pt has chronic lower extremity lymphedema.    Pt was evaluated  in the ER where she was found to have acute bilateral CVA - left posterior thalamus and right corona radiata for which she was admitted; Neurology was consulted.  Asprin and Lipitor initiated. ECHO without ASD.  PT/OT consulted, BRANDYN was recommended and returned to [facility] on 12/31/2.     Past Medical History:   Diagnosis Date    Cataract     right eye completed; future plan for left eye procceure    Disorder of thyroid 11/9/2023    uses synthroid    Essential hypertension     High blood pressure     High cholesterol     Hx of pancreatitis     6 times    Hyperlipidemia     Lymphedema of both lower extremities     longstanding, unknown origin    Muscle weakness     Visual impairment      Past Surgical History:   Procedure Laterality Date    CATARACT      CHOLECYSTECTOMY       Family History   Problem Relation Age of Onset    Hypertension Father     Heart Disorder Paternal Grandfather     Psychiatric Sister     Lipids Brother     Obesity Sister      Social History     Socioeconomic History    Marital status: Single   Tobacco Use    Smoking status: Never    Smokeless tobacco: Never   Vaping Use    Vaping Use: Never used   Substance and Sexual Activity    Alcohol use: Not Currently    Drug use: Not Currently     Social Determinants of Health     Food Insecurity: No Food Insecurity (12/29/2023)    Food Insecurity     Food Insecurity: Never true   Transportation Needs: No Transportation Needs (12/29/2023)    Transportation Needs     Lack of Transportation: No   Recent Concern: Transportation Needs - Unmet Transportation Needs (11/9/2023)    Transportation Needs     Lack of Transportation: Yes   Housing Stability: Low Risk  (12/29/2023)    Housing Stability     Housing Instability: No     IMMUNIZATIONS  Immunization History   Administered Date(s) Administered    Covid-19 Vaccine [employer]) 0.5ml 04/06/2021, 04/14/2022    FLU VAC High Dose 65 YRS & Older PRSV Free (71497) 11/17/2023      ALLERGIES:  Allergies   Allergen  Reactions    Amoxicillin HIVES     Tolerates cefazolin (8/6/21)    Erythromycin HIVES    Penicillins HIVES     Tolerates rocephin in the past, tolerates cefazolin (8/6/21)      Tetracycline HIVES    Alc-Benzyl Alc-Sulfamethoxazole-Trimethoprim RASH    Sulfa Antibiotics RASH    Tetracyclines & Related RASH    Opioid Analgesics NAUSEA ONLY     Morphine       CODE STATUS:  Full Code    ADVANCED CARE PLANNING TEAM:  discharge plan is to transfer to [facility] in [place], [facility].     MEDICATIONS:  Atorvastatin 10 mg (20mg) at bedtime  Aspirin 325 mg daily  Clear eyes - 1 gtt both eyes daily prn  Cyanocobalamin 1000mcg daily  Gabapentin 100 daily  Levothyroxine 100mcg daily  Lidocaine 4% patch to neck twice daily  Metoprolol tartrate 100 mg qam  Metoprolol tartrate 50 mg q hs  Nystatin 5 ml (500,000) 4 x daily  Potassium 20 meq every other day  Risperidone 0.5 tabs (2 tablets) q hs  Secura skin care prn  Sodium chloride 0.65% nasal spray; 1 spray daily  Torsemide 40 mg twice daily  Tylenol 1000 mg q 8 hrs prn  Vit D 2,000 daily  Biofreeze to upper extremities and shoulders q 8 hrs     SUBJECTIVE: denies headache, chest pain, cough, SOB.  Denies chills, fevers, n/v/d/c.  Reports slurred speech much improved.  Anxious re: discharge.          PHYSICAL EXAM: 115/61.  P 71. POx 95%. T 97.4.  wgt 273.4# from 268, 261.4#  GENERAL HEALTH:well developed, well nourished, in no apparent distress   LINES, TUBES, DRAINS:  none  SKIN: warm, dry  WOUND:  sacral pressure ulcer;  See wound assessment above and  per staff nurses  HENT: mild L facial droop improved.  + slurred speech improved.    NECK: supple  BREAST: deferred exam   RESPIRATORY: Lungs clear.  CARDIOVASCULAR:  RRR, rate 71. Cardiac event monitor removed.   ABDOMEN:  normal active BS+, soft, nondistended; no guarding  :Deferred  LYMPHATIC:lymphedema   bilateral LE edema    MUSCULOSKELETAL:  increased weakness lower extremities.    EXTREMITIES:increased bilateral LE  lymphedema  NEUROLOGIC: s/p CVA  Thought process slow; forgetful.   PSYCHIATRIC: OR x 3. Periods of forgetfulness & confusion.     MEDICAL DECISION MAKING: pt needs assistance in making decisions; slow to respond to tasks and instructions; brother assists with complex medical decisions.      Lab Results: 1/30/24.  WBC7.5. HGB 10.9.  Platelets 139.   Na 142.  K 4.1. Cl 104. Co2 31. Bun 32. Creat 1.07.  gfr 50. Alb 2.9.    Assessment/Plan    Cardiac Monitor placed 1/22/24 and removed  30 day event monitor ended  F/U [facility] 3/5/24 1 pm    Bilateral CVA - left posterior thalamus and right corona radiata  Aspirin 325 daily  Atorvastatin 20 mg q hs  Atorvastatin 20 q hs    Chronic lymphedema - increased edema  Compression hose  Compression wraps  Lymphedema therapy  Torsemide  40 mg twice daily   Trend weights     Dysphagia/slurred speech/ L facial droop improved  Chopped diet, Crush meds    Dyslipidemia, LDL 21  -  Atorvastatin 20 mg q hs    Hypokalemia  -  Potassium 20 meq bid    Essential hypertension   Metoprolol tartrate 100 mg every morning. 50 mg q hs    Hypothyroidism - Levothyroxine 100 mcg qam before breakfast    Sacral Pressure Ulcer  Daily wound care -  0.8 cm x 0.3 cm x 0.2 cm - stage 2.   Zinc and alginate.  F/U w/ wound MD weekly    Chronic diastolic heart failure  Torsemide 40 mg bid - 8am and 4 pm  K+ supplement 20 meq bid  Metoprolol tartrate  100 mg daily/50 q hs    Chronic pain/ osteoarthritis  Tramadol 5 mg q 6 hrs prn  Lidocaine 4% external patches to neck neck  Biofreeze to shoulders and arms q 8 hrs    Fungal rash  Nystatin 100,000 external powder prn    Dispo: discharge plan:  transfer to [facility] in [facility].       FOLLOW UP APPOINTMENTS   *Follow-Up with PCP within 1-2 weeks following BRANDYN discharge.   *Follow-Up with specialists as recommended; neuro, cardiology.  *Dr. Garcia Neuro, 2/27 at 1 pm  *cardiologist 3/5/24 at 1 pm    *Greater than 35 minutes spent w/ patient and  staff, including but not limited to/ reviewing present status, needs, abilities with disciplines, reviewing medical records, vital signs, labs, completing med rec and entering orders to establish plan of care in [facility].        Note to patient: The 21st Century Cures Act makes medical notes like these available to patients in the interest of transparency. However, this is a medical document intended as peer to peer communication. It is written in medical language and may contain abbreviations or verbiage that are unfamiliar. It may appear blunt or direct. Medical documents are intended to carry relevant information, facts as evident, and the clinical opinion of the practitioner who signs the document.    Ellen (Marcie) Blayne, MARCELL  2/15/24   630 pm  [facility] Team

## 2024-06-03 NOTE — PLAN OF CARE
Patient presented with psycho somatic complains, her affect was labile and mood she reported to be anxious, speech was hyperverbal, judgement continues to be inappropriate to situation and she presents with flight of ideas, she appeared demanding and had verbal altercations with patel staff, she was intrusive with peers, she required multiple verbal redirections, she had some resistance with the top of the hour request asking why it was only her, staff were able to explain that it was part of her care plan and she was somewhat receptive, she continues to endorse generalized pains and aches and requested Ibuprofen prn, she rated her pain at 9/10 unresolved with medication management.   Problem: Psychotic Signs/Symptoms  Goal: Improved Behavioral Control (Psychotic Signs/Symptoms)  Outcome: Not Progressing  Intervention: Manage Behavior  Recent Flowsheet Documentation  Taken 6/2/2024 1800 by Kay Jarrell RN  De-Escalation Techniques: verbally redirected     Problem: Anxiety  Goal: Anxiety Reduction or Resolution  Intervention: Promote Anxiety Reduction  Recent Flowsheet Documentation  Taken 6/2/2024 1800 by Kay Jarrell RN  Supportive Measures:   positive reinforcement provided   active listening utilized  Family/Support System Care:   involvement promoted   self-care encouraged   support provided   Goal Outcome Evaluation:    Plan of Care Reviewed With: patient                    Pt A&Ox4. Denies pain except when arms and feet are lifted/moved. Extensive swelling, redness, blistering in BLEs. Borders marked this am per Dr. Padmini Choe request. Rachel Hodge evaluated by wound care. Applied topical ointment ordered by wound care. Pt briefly up to chair with PT/OT. Transfers with walker and 2 assists. Fall risk and safety precautions in place.     Problem: SKIN/TISSUE INTEGRITY - ADULT  Goal: Incision(s), wounds(s) or drain site(s) healing without S/S of infection  Description: INTERVENTIONS:  - Assess and document risk factors for pressure ulcer development  - Assess and document skin integrity  - Assess and document dressing/incision, wound bed, drain sites and surrounding tissue  - Implement wound care per orders  - Initiate isolation precautions as appropriate  - Initiate Pressure Ulcer prevention bundle as indicated  Outcome: Not Progressing

## 2025-04-22 NOTE — OCCUPATIONAL THERAPY NOTE
is here for a follow up visit from a left medial epicondylar ORIF.    Pain has been appropriate since surgery, no major medical complications since surgery.      Current Outpatient Medications on File Prior to Visit   Medication Sig Dispense Refill    aspirin 81 MG EC tablet Take 1 tablet by mouth in the morning and at bedtime 60 tablet 0    ondansetron (ZOFRAN) 4 MG tablet Take 1 tablet by mouth every 8 hours as needed for Nausea or Vomiting 60 tablet 0    medroxyPROGESTERone (DEPO-PROVERA) 150 MG/ML injection INJECT 150 MG BY INTRAMUSCULAR ROUTE EVERY 3 MONTHS FOR 1 DAY      naproxen (NAPROSYN) 500 MG tablet Take 1 tablet by mouth 2 times daily for 14 days 28 tablet 0     No current facility-administered medications on file prior to visit.       ROS:  General: denies agitation, major chest pain, unexpected weakness  Patient states stiffness, but improving pain  Skin: healing wound is without issue or drainage   Strength: appropriate weakness of involved extremity is resolving since surgery      Physical Examination:    There were no vitals taken for this visit.    Dressing: none  Skin: clean, dry, intact  Sensation intact to light touch at level of wound and distally  Strength is not tested  Range of motion is   Distal swelling is noted, but appropriate for postoperative course  Distal capillary refill less than 2 seconds      Imaging:    Postoperative imaging: Xrays of the left elbow are ordered and reviewed by myself with fracture in anatomic alignment with stable internal fixation.  No evidence of hardware complication.    Fracture is healed.        Assessment: Status post ORIF left elbow fracture, dislocation    Plan:  Patient will continue aggressive physical therapy  Wound care was reviewed  Weightbearing will be full through the arm, I have emphasized that she needs to work as hard as possible on her range of motion as the window is closing for recovery from her range of motion, though I do  OCCUPATIONAL THERAPY EVALUATION - INPATIENT     Room Number: 418/418-A  Evaluation Date: 4/28/2021  Type of Evaluation: Initial  Presenting Problem: cellulitis    Physician Order: IP Consult to Occupational Therapy  Reason for Therapy: ADL/IADL Dysfunction car (she has been home bound since Dec 2020). pt stating she has severe B knee OA R>L and with increased pain, limited her mobility/ambulation at home. Her brother assists with IADLs. SUBJECTIVE   Pt stated, \"I am just having such a hard time. \"    Kathi 56.46%  Standardized Score (AM-PAC Scale): 34.69  CMS Modifier (G-Code): CK    FUNCTIONAL TRANSFER ASSESSMENT  Supine to Sit : Moderate assistance  Sit to Stand: Minimum assistance    Skilled Therapy Provided: Pt received supine in bed.      Transfers  Supi Assessment/Performance Deficits MODERATE - Comorbidities and min to mod modifications of tasks    Clinical Decision Making MODERATE - Analysis of occupational profile, detailed assessments, several treatment options    Overall Complexity MODERATE     OT Franck Cava

## 2025-04-27 NOTE — PROGRESS NOTES
05/27/23 1302   Clinical Encounter Type   Visited With Patient; Health care provider  (HAN Abreu said pt. is decisional)   Taxonomy   Intended Effects Aligning care plan with patient's values   Interventions Ask guided questions about cultural and Episcopal values;Assist patient with documenting values;Assist someone with Advance Directives;Provde spiritual/Episcopal resources; Share written prayer      initiated relationship with Acie Olszewski, providing active listening as she shared her fondness for singing in her [employer] choir and fellow parishioners. Shell Paige was discussed, and how to complete one should she wish to do so during her hospital stay.  left the form for her to review. Spiritual Care support can be requested via an Epic consult. to go home

## (undated) NOTE — IP AVS SNAPSHOT
[address]            (For Outpatient Use Only) Initial Admit Date: 4/25/2021   Inpt/Obs Admit Date: Inpt: 4/25/21 / Obs: N/A   Discharge Date:    Vira Gonzalez:  [de-identified]   MRN: [de-identified]   CSN: [id number]   CEID: [id number] Subscriber Name:  Zen Romero DOB:    Subscriber ID:  Pt Rel to Subscriber:    Hospital Account Financial Class: Medicare    2021

## (undated) NOTE — IP AVS SNAPSHOT
[address]            (For Outpatient Use Only) Initial Admit Date: 8/6/2021   Inpt/Obs Admit Date: Inpt: 8/6/21 / Obs: N/A   Discharge Date:    Usha Coleman:  [de-identified]   MRN: [de-identified]   CSN: [id number]   CEID: [id number] Subscriber :    Subscriber ID:  Pt Rel to Subscriber:    Hospital Account Financial Class: Medicare    2021

## (undated) NOTE — IP AVS SNAPSHOT
[address]            (For Outpatient Use Only) Initial Admit Date: 2022   Inpt/Obs Admit Date: Inpt: 22 / Obs: N/A   Discharge Date:    Parker Stone:  [de-identified]   MRN: [de-identified]   CSN: [id number]   CEID: [id number]        ENCOUNTER  Patient Class: Inpatient Admitting Provider: Elizabet Ware MD Unit: [facility] 3SW-A   Hospital Service: Medical Attending Provider: Denis Bonilla MD   Bed: 367-A   Visit Type:   Referring Physician: No ref. provider found Billing Flag:    Admit Diagnosis: Lymphedema [I89.0]      PATIENT  Legal Name:   Camelia Rowe   Legal Sex: Female  Gender ID:              Pref Name:    PCP:  Arnold Conde Home: [phone]   Address:  [address] [place] [address] Main: 1950 (71 yrs) Mobile: [phone]         City/State/Zip: [address] [place] [address] [address] Marital: Single Language: [address] [place] [address] Drive: [facility] SSN4: [id number] Protestant: [address] Not Genora Clapper*     Race: White Ethnicity: Non  Or  O*   EMERGENCY CONTACT   Name Relationship Legal Guardian? Home Phone Work Phone Mobile Phone   Jose Alarcon Brother  OTHER    [phone]           GUARANTOR  Guarantor: Ian Bledsoe : 1950 Home Phone: [phone]   Address: [facility] [address]  Sex: Female Work Phone:    City/State/Zip: [address] [place] [address] Ave   Rel. to Patient: Self Guarantor ID: [id number]   GUARANTOR EMPLOYER   Employer:  Status: RETIRED     COVERAGE  PRIMARY INSURANCE   Payor: MEDICARE Plan: MEDICARE PART A&B   Group Number:  Insurance Type: INDEMNITY   Subscriber Name: Lisa Burrell : 1950   Subscriber ID: [id number] Pt Rel to Subscriber: Self   SECONDARY INSURANCE   Payor: AARP Plan: AARP   Group Number:  Insurance Type: INDEMNITY   Subscriber Name: Lisa Burrell : 1950   Subscriber ID: [id number] Pt Rel to Subscriber: SELF   TERTIARY INSURANCE   Payor:  Plan:    Group Number:  Insurance Type:    Subscriber Name:  Subscriber :    Subscriber ID:  Pt Rel to Subscriber:    Hospital Account Financial Class: Medicare    2022

## (undated) NOTE — IP AVS SNAPSHOT
Patient Demographics     Address  [address] [place] [address] Phone  [phone] [facility])  [phone] (Work)  [phone] (Mobile) E-mail Address  [email or web address]      Emergency Contact(s)     Name Relation Home Work Crush on original products [facility] [phone] [phone]  [phone]             Follow up In 1 week.                 Your medication list      TAKE these medications       Instructions Authorizing Provider Morning Afternoon Evening As Needed   atorvastatin 10 MG Tabs  Commonly known as: LIP [id number] atorvastatin (LIPITOR) tab 10 mg 01/09/21 1101 Given      [id number] ceFAZolin sodium (ANCEF/KEFZOL) 2 GM/20ML premix IV syringe 2 g 01/08/21 2209 Given      [id number] ceFAZolin sodium (ANCEF/KEFZOL) 2 GM/20ML premix IV syringe 2 g 01/09/21 0616 Specimen: Blood,peripheral      Blood Culture Result No Growth 3 Days    Rapid SARS-CoV-2 by PCR STAT [[id number]]  (Normal) Collected: 01/06/21 1746    Order Status: Completed Lab Status: Final result Updated: 01/06/21 3655    Specimen: Other from [facility] Procedure Laterality Date   • CATARACT     • CHOLECYSTECTOMY         Social History:  reports that she has never smoked. She has never used smokeless tobacco. She reports previous alcohol use. She reports previous drug use.     Family History: reviewd and n Cardiovascular: S1, S2. Regular rate and rhythm. No murmurs, rubs or gallops. Equal pulses. Chest and Back: No tenderness or deformity. Abdomen: Soft, nontender, nondistended. Positive bowel sounds. No rebound, guarding or organomegaly.   Neurologic: No Physical Therapy Note signed by Sdaie Lua PTA at 1/9/2021 10:12 AM  Version 1 of 1    Author: Sadie Lua PTA Service: Rehab Author Type: Physical Therapy Assistant    Filed: 1/9/2021 10:12 AM Date of Service: 1/9/2021 10:07 AM Status: Signed How much difficulty does the patient currently have. ..  -   Turning over in bed (including adjusting bedclothes, sheets and blankets)?: None   -   Sitting down on and standing up from a chair with arms (e.g., wheelchair, bedside commode, etc.): None   - Curb Step: NA          Patient End of Session: Up in chair;Needs met;Call light within reach;RN aware of session/findings; All patient questions and concerns addressed    ASSESSMENT     Patient currently does not meet criteria for skilled inpatient physical Attempted to see Pt this AM - RN Vita Chin aware of attempt. Pt reported difficulty resting d/t multiple visitors - requesting to rest at this time. Will continue to follow. [AR.1]          Attribution Hodge    AR. 1 Melissa Logan, PANCHO on 1/8/2021  9:42 A Prior Level of [place]: per pt, she was ind with all functional mobility and ADL PTA. SUBJECTIVE  \"I want to make sure I am safe at home. \"    Patient self-stated goal is to return home.     OBJECTIVE     Fall Risk: Standard fall risk    WEIGHT ALEJANDRO Gait Assistance: Supervision  Distance (ft): 3  Assistive Device: Rolling walker  Pattern: Shuffle  Stoop/Curb Assistance: Not tested       Skilled Therapy Provided: evaluation; pt instructed in the role of PT, POC and discharge plans.  Pt verbalized unders Goal #2 Patient is able to demonstrate transfers Sit to/from Stand at assistance level: modified independent     Goal #3 Patient is able to ambulate 150 feet with assist device: walker - rolling at assistance level: modified independent     Goal #4    Goal Video Swallow Study Notes    No notes of this type exist for this encounter. SLP Notes    No notes of this type exist for this encounter.      Multidisciplinary Problems     Active Goals     Not on file

## (undated) NOTE — IP AVS SNAPSHOT
[address]            (For Outpatient Use Only) Initial Admit Date: 1/6/2021   Inpt/Obs Admit Date: Inpt: N/A / Obs: 01/06/21   Discharge Date:    Huel Curling:  [de-identified]   MRN: [de-identified]   CSN: [id number]   CEID: [id number] Subscriber ID: [id number] Pt Rel to Subscriber: SELF   TERTIARY INSURANCE   Payor:  Plan:    Group Number:  Insurance Type:    Subscriber Name:  Subscriber :    Subscriber ID:  Pt Rel to Subscriber:    Hospital Account Financial Class: Managed Care

## (undated) NOTE — IP AVS SNAPSHOT
Patient Demographics     Address  [address] [place] [address] Phone  [phone] [facility])  [phone] (Work)  [phone] (Mobile) E-mail Address  [email or web address]      Emergency Contact(s)     Name Relation Home Work Training Amigo Take 1,000 mcg by mouth daily. Diclofenac Sodium 1 % Gel  Commonly known as: VOLTAREN      Apply 2 g topically 4 (four) times daily. Fluticasone Propionate 50 MCG/ACT Susp  Commonly known as: FLONASE      1 spray by Nasal route as needed. [id number] Cefepime HCl (MAXIPIME) 2 g in sodium chloride 0.9% 100 mL IVPB-MBP 08/11/21 1126 New Bag      [id number] Levothyroxine Sodium tab 75 mcg 08/11/21 0629 Given      [id number] atorvastatin (LIPITOR) tab 10 mg 08/10/21 2108 Given      [id number] furose 1446    Specimen: Other from Leg, left      Aerobic Culture Result 2+ growth Pseudomonas aeruginosa      1+ growth Enterococcus faecalis      2+ growth Corynebacterium striatum     Aerobic Smear No WBCs seen      1+ Gram Negative Rods    Susceptibility Pseudomonas aeruginosa by PCR Detected     PCR Comment --    Rapid SARS-CoV-2 by PCR [[id number]]  (Normal) Collected: 08/06/21 1405    Order Status: Completed Lab Status: Final result Updated: 08/06/21 1443    Specimen: Other from [address] [place] [address] J Surgical History:   Past Surgical History:   Procedure Laterality Date   • CATARACT     • CHOLECYSTECTOMY         Social History:  reports that she has never smoked. She has never used smokeless tobacco. She reports previous alcohol use.  She reports previo Capsule Delayed Release, Take 20 mg by mouth every morning.  , Disp: , Rfl:         Review of Systems:   A comprehensive 14 point review of systems was completed. Pertinent positives and negatives noted in the HPI.     Physical Exam:    /61   Pulse IV ancef  2. cx's  3. Wound care  4. IV lasix  5. Avoid sepsis bolus due to fluid overload  4. HTN  1. PO meds, lasix  5. N/v, abd pain  1. CT a/p pending  6. Hyponatremia  7. HTN  8.  HLD    Quality:  · DVT Prophylaxis: heparin  · CODE status: full  · Fole History:   Diagnosis Date   • Cataract     right eye completed; future plan for left eye procceure   • Disorder of thyroid     uses synthroid   • High blood pressure    • High cholesterol    • Hx of pancreatitis     6 times   • Lymphedema of both lower ext 20 mg, 20 mg, Oral, QAM AC  •  Heparin Sodium (Porcine) 5000 UNIT/ML injection 7,500 Units, 7,500 Units, Subcutaneous, Q8H [address]  •  acetaminophen (TYLENOL) tab 650 mg, 650 mg, Oral, Q6H PRN  •  melatonin tab 3 mg, 3 mg, Oral, Nightly PRN  •  ondansetron (ZO AST 29 08/06/2021    PGLU 84 08/06/2021       Microbiologic Data:   (this admission)     Imaging:     Imaging results reviewed     Impression:  Patient Active Problem List:     Cellulitis of left lower extremity     Lymphedema     Left leg cellulitis     S Type: Physical Therapy Assistant    Filed: 8/11/2021  3:11 PM Date of Service: 8/11/2021  9:34 AM Status: Signed    : Carl Ling PTA (Physical Therapy Assistant)        PHYSICAL THERAPY TREATMENT NOTE - INPATIENT    Room Number: 621/178-W Breathing techniques;Repositioning[CY. 2]    BALANCE[CY.1]                                                                                                                     Static Sitting: Fair  Dynamic Sitting: Fair           Static Standing: Fair -  Dyn mod A x 2, pt left in bed, needs met. [CY.3]           Patient End of Session: Needs met;Call light within reach;RN aware of session/findings; All patient questions and concerns addressed[CY. 2]    ASSESSMENT[CY.1]   Pt continues to present with impaired stre Date of Service: 8/9/2021  4:06 PM Status: Signed    : Torito Wallace OT (Occupational Therapist)       OCCUPATIONAL THERAPY EVALUATION - INPATIENT     Room Number: 763/489-U  Evaluation Date: 8/9/2021  Type of Evaluation: Initial  Presenting Proble retired  Hand Dominance: Right  Drives: No  Patient Regularly Uses: Glasses    Prior Level of Function:  Patient reports being mod I with ADLs and functional mobility with use of RW in the home.   Patient reports she lives with her brother but does not want ASSESSMENT  AM-PAC ‘6-Clicks’ Inpatient Daily Activity Short Form  How much help from another person does the patient currently need…  -   Putting on and taking off regular lower body clothing?: A Lot  -   Bathing (including washing, rinsing, drying)?: A L reports she has not been able to leave her home to attend lymphedema clinic and has, in the past year, received [facility] care to address lymphedema but focus was on healing wounds.   Patient reports no compression has been applied to the legs and no other edema r self-care, functional mobility, instrumental activities of daily living, rest and sleep, work, leisure and social participation.      The patient is functioning below her previous functional level and would benefit from skilled inpatient OT to address the a with min assist    UE Exercise Program Goal  Patient will be modified independent with bilateral AAROM HEP (home exercise program).     Additional Goals:  Patient will tolerate light compression to RLE > 8 hours to assist in edema reduction and improve tiss

## (undated) NOTE — IP AVS SNAPSHOT
Patient Demographics     Address  [address] [place] [address] Phone  [phone] [facility])  [phone] (Work)  [phone] (Mobile) E-mail Address  [email or web address]      Emergency Contact(s)     Name Relation Home Work Wikidot capsules (1,000 mg total) by mouth 2 (two) times daily for 21 days. Stop taking on: May 20, 2021   Suzanna Moran MD         Fluticasone Propionate 50 MCG/ACT Susp  Commonly known as: FLONASE      1 spray by Nasal route as needed.           Metoprolol Tartr 2045 Given      [id number] bumetanide (BUMEX) tab 1 mg 04/29/21 1022 Given      [id number] ceFAZolin sodium (ANCEF/KEFZOL) 2 GM/20ML premix IV syringe 2 g 04/28/21 1705 Given      [id number] ceFAZolin sodium (ANCEF/KEFZOL) 2 GM/20ML premix IV syringe 2 g 04/2 Completed Lab Status: Preliminary result Updated: 04/28/21 0911    Specimen: Blood,peripheral      Blood Culture Result Corynebacterium striatum     Blood Culture Smear Gram Positive Rods    Rapid SARS-CoV-2 by PCR STAT [[id number]]  (Normal) Collected: 04/ extremities     longstanding, unknown origin   • Muscle weakness    • Visual impairment[PT.2]         Past Surgical History:[PT.1]   Past Surgical History:   Procedure Laterality Date   • CATARACT     • CHOLECYSTECTOMY[PT.2]         Social History:[PT.1] Temp 97.7 °F (36.5 °C) (Temporal)   Resp 18   Ht 5' 3\" (1.6 m)   Wt 280 lb (127 kg)   SpO2 98%   BMI 49.60 kg/m²[PT.2]   General: No acute distress. Alert and oriented x 3. HEENT: Normocephalic atraumatic.    Respiratory: Clear to auscultation bilaterally 8. Consider ID consult in AM  3. Leukocytosis  1. Monitor   4. Hyponatremia  5. Elevated AST  1. Monitor   6. Essential hypertension  1. Metoprolol adjusted   2. Monitor hemodynamics   7. Dyslipidemia  1. Lipitor   8. Hypothyroidism  1. Synthroid[PT. 3] n/v/d. No cough or SOB. No hematuria or dysuria. Pt felt to have cellulitis and started on ancef. Now with 2/2+ BCx with GPR.      History:[IC.1]  Past Medical History:   Diagnosis Date   • Cataract     right eye completed; future plan for left eye procceu Sodium (PROTONIX) EC tab 20 mg, 20 mg, Oral, QAM AC  •  ceFAZolin sodium (ANCEF/KEFZOL) 2 GM/20ML premix IV syringe 2 g, 2 g, Intravenous, Q12H  •  atorvastatin (LIPITOR) tab 10 mg, 10 mg, Oral, Nightly  •  Enoxaparin Sodium (LOVENOX) 60 MG/0.6ML injection cellulitis of distal RLE extending proximally along inner thigh. Several small wounds, superficial[IC.3]    Laboratory Data:[IC.1]    Recent Labs   Lab 04/25/21  1840 04/26/21  0557   RBC 4.06 3.85   HGB 12.5 11.8*   HCT 37.8 35.5   MCV 93.1 92.2   MCH 30. tortuosity of the great vessels. Chest wall structures are otherwise unremarkable. CONCLUSION:  1. Cardiomegaly is unchanged and accentuated by portable technique. 2. Scattered scar-like densities in lungs are stable.     Dictated by (CST): Nel IC.3 - Tess Barrett MD on 4/27/2021  5:28 PM  IC. 4 - Tess Barrett MD on 4/27/2021  3:06 PM  IC.5 - Tess Barrett MD on 4/27/2021  5:33 PM                     D/C Summary    No notes of this type exist for this encounter.         Physical Therapy Notes Thrombocytopenia (HCC)    Azotemia    Metabolic acidosis    Lymphedema of both lower extremities    Sepsis without acute organ dysfunction (HCC)[EP.3]      Past Medical History[EP.1]  Past Medical History:   Diagnosis Date   • Cataract     right eye comple ASSESSMENT[EP.1]  Rating: Unable to rate  Location: R groin, BLEs  Management Techniques: Activity promotion; Body mechanics;Breathing techniques;Relaxation;Repositioning[EP.3]    COGNITION[EP.1]  · Overall Cognitive Status:  Impaired and safety, insight[EP Assistive Device: Rolling walker  Pattern: R Decreased stance time;L Decreased stance time[EP.3]          Skilled Therapy Provided:[EP.1] Pt performed supine to sit with min A for RLE, significantly increased time to perform task.  Pt performed sit to stand gait.  The patient is below baseline and would benefit from skilled inpatient PT to address the above deficits to assist patient in returning to prior to level of function. [EP.1] Pt would benefit from increased assistance/supervision in the long term secon INPATIENT     Room Number: 418/418-A  Evaluation Date: 4/27/2021  Type of Evaluation:[EP.1] Initial[EP.2]  Physician Order: PT Eval and Treat    Presenting Problem: BLE edema, fever[EP.3]  Reason for Therapy: Mobility Dysfunction and Discharge Planning stating she has severe B knee OA R>L and with increased pain, limited her mobility/ambulation at home.  Her brother assists with IADLs.[EP.2]    SUBJECTIVE[EP.1]  Pt is agreeable to participate[EP.2]    Patient self-stated goal is[EP.1] get stronger to go h currently need. ..[EP.1]   -   Moving to and from a bed to a chair (including a wheelchair)?: A Little   -   Need to walk in hospital room?: A Little   -   Climbing 3-5 steps with a railing?: A Lot[EP. 3]       AM-PAC Score:[EP.1]  Raw Score: 17   Approx Deg evaluation, the patient presents with the following impairments[EP.1] decreased ROM. strength, balance, endurance, gait, safety[EP.2]. Functional outcome measures completed include[EP.1] AMPAC[EP.2].   Based on this evaluation, patient's clinical presentati Version 1 of 3    Author: Moe Peck PT Service: Rehab Author Type: Physical Therapist    Filed: 4/27/2021  3:26 PM Date of Service: 4/27/2021  3:15 PM Status: Signed    : Moe Peck PT (Physical Therapist)    Related Notes: Addendum ambulating occasionally with cane and now using RW. She is struggling with ADLs. She reports difficulty getting in and out of bed at home. She has history of HHPT.  She has previous had lymphedema therapy but has not been able to go to OP due to inability t blankets)?: A Little   -   Sitting down on and standing up from a chair with arms (e.g., wheelchair, bedside commode, etc.): A Little   -   Moving from lying on back to sitting on the side of the bed?: A Little   How much help from another person does the ASSESSMENT   Patient is a 79year old female admitted on 4/25/2021 for[EP.1] BLE cellulitis, sepsis,[EP.2] . Pertinent comorbidities and personal factors impacting therapy include[EP.1] lymphedema[EP.2].   In this PT evaluation, the patient presents with t EP.2 Jared Pierson, PT on 4/27/2021  3:16 PM               Physical Therapy Note signed by Ludie Siemens, PT at 4/27/2021 10:19 AM  Version 1 of 1    Author: Ludie Siemens, PT Service: Rehab Author Type: Physical Therapist    Filed: 4/27/2021 lower extremities     longstanding, unknown origin   • Muscle weakness    • Visual impairment        Past Surgical History  Past Surgical History:   Procedure Laterality Date   • CATARACT     • Emir [id number] was participated in and was motivated for therapy today.     RANGE OF MOTION AND STRENGTH ASSESSMENT  Upper extremity ROM is within functional limits     Upper extremity strength is within functional limits     COORDINATION  Gross Motor    [facility]    Fine Motor place; Ice applied    ASSESSMENT     Patient is a 79year old female admitted on 4/25/2021 for cellulitis. Complete medical history and occupational profile noted above. Functional outcome measures completed include AMPAC, MMT, ROM.  In this OT evaluation pa Functional Transfer Goals  Patient will transfer from supine to sit:  with supervision  Patient will transfer from sit to stand:  with supervision  Patient will transfer to toilet:  with supervision    UE Exercise Program Goal  Patient will be supervision